# Patient Record
Sex: MALE | Race: BLACK OR AFRICAN AMERICAN | Employment: OTHER | URBAN - METROPOLITAN AREA
[De-identification: names, ages, dates, MRNs, and addresses within clinical notes are randomized per-mention and may not be internally consistent; named-entity substitution may affect disease eponyms.]

---

## 2018-09-18 ENCOUNTER — HOSPITAL ENCOUNTER (INPATIENT)
Dept: MEDSURG UNIT | Age: 73
LOS: 11 days | Discharge: NURSING, REHAB OR LONG-TERM CARE | DRG: 871 | End: 2018-09-29
Attending: EMERGENCY MEDICINE
Payer: MEDICARE

## 2018-09-18 DIAGNOSIS — J18.9 PNEUMONIA DUE TO ORGANISM: Primary | ICD-10-CM

## 2018-09-18 DIAGNOSIS — J44.1 COPD EXACERBATION (HCC): ICD-10-CM

## 2018-09-18 DIAGNOSIS — A41.9 SEPTICEMIA (HCC): ICD-10-CM

## 2018-09-18 DIAGNOSIS — M54.50 CHRONIC LOW BACK PAIN WITHOUT SCIATICA, UNSPECIFIED BACK PAIN LATERALITY: ICD-10-CM

## 2018-09-18 DIAGNOSIS — G89.29 CHRONIC LOW BACK PAIN WITHOUT SCIATICA, UNSPECIFIED BACK PAIN LATERALITY: ICD-10-CM

## 2018-09-18 LAB
A/G RATIO: 1.3 (ref 1.1–2.2)
ALBUMIN SERPL-MCNC: 3.7 G/DL (ref 3.4–5)
ALP BLD-CCNC: 97 U/L (ref 40–129)
ALT SERPL-CCNC: 14 U/L (ref 10–40)
ANION GAP SERPL CALCULATED.3IONS-SCNC: 13 MMOL/L (ref 3–16)
AST SERPL-CCNC: 20 U/L (ref 15–37)
BASOPHILS ABSOLUTE: 0.1 K/UL (ref 0–0.2)
BASOPHILS RELATIVE PERCENT: 0.3 %
BILIRUB SERPL-MCNC: 0.7 MG/DL (ref 0–1)
BUN BLDV-MCNC: 19 MG/DL (ref 7–20)
CALCIUM SERPL-MCNC: 9 MG/DL (ref 8.3–10.6)
CHLORIDE BLD-SCNC: 92 MMOL/L (ref 99–110)
CO2: 31 MMOL/L (ref 21–32)
CREAT SERPL-MCNC: 1.4 MG/DL (ref 0.8–1.3)
EOSINOPHILS ABSOLUTE: 0.1 K/UL (ref 0–0.6)
EOSINOPHILS RELATIVE PERCENT: 0.6 %
GFR AFRICAN AMERICAN: >60
GFR NON-AFRICAN AMERICAN: 50
GLOBULIN: 2.9 G/DL
GLUCOSE BLD-MCNC: 95 MG/DL (ref 70–99)
HCT VFR BLD CALC: 35.2 % (ref 40.5–52.5)
HEMOGLOBIN: 10.7 G/DL (ref 13.5–17.5)
LACTIC ACID: 2.6 MMOL/L (ref 0.4–2)
LACTIC ACID: 2.7 MMOL/L (ref 0.4–2)
LACTIC ACID: 3 MMOL/L (ref 0.4–2)
LACTIC ACID: 4.7 MMOL/L (ref 0.4–2)
LYMPHOCYTES ABSOLUTE: 1.3 K/UL (ref 1–5.1)
LYMPHOCYTES RELATIVE PERCENT: 7.1 %
MCH RBC QN AUTO: 29 PG (ref 26–34)
MCHC RBC AUTO-ENTMCNC: 30.3 G/DL (ref 31–36)
MCV RBC AUTO: 95.6 FL (ref 80–100)
MONOCYTES ABSOLUTE: 1.5 K/UL (ref 0–1.3)
MONOCYTES RELATIVE PERCENT: 8.4 %
NEUTROPHILS ABSOLUTE: 15.3 K/UL (ref 1.7–7.7)
NEUTROPHILS RELATIVE PERCENT: 83.6 %
PDW BLD-RTO: 14.9 % (ref 12.4–15.4)
PLATELET # BLD: 212 K/UL (ref 135–450)
PMV BLD AUTO: 7.6 FL (ref 5–10.5)
POTASSIUM SERPL-SCNC: 5 MMOL/L (ref 3.5–5.1)
PRO-BNP: 1526 PG/ML (ref 0–124)
PROCALCITONIN: 38.45 NG/ML (ref 0–0.15)
RBC # BLD: 3.68 M/UL (ref 4.2–5.9)
SODIUM BLD-SCNC: 136 MMOL/L (ref 136–145)
TOTAL PROTEIN: 6.6 G/DL (ref 6.4–8.2)
TROPONIN: 0.01 NG/ML
WBC # BLD: 18.3 K/UL (ref 4–11)

## 2018-09-18 PROCEDURE — 84484 ASSAY OF TROPONIN QUANT: CPT

## 2018-09-18 PROCEDURE — 94640 AIRWAY INHALATION TREATMENT: CPT

## 2018-09-18 PROCEDURE — 93005 ELECTROCARDIOGRAM TRACING: CPT

## 2018-09-18 PROCEDURE — 83605 ASSAY OF LACTIC ACID: CPT

## 2018-09-18 PROCEDURE — 87040 BLOOD CULTURE FOR BACTERIA: CPT

## 2018-09-18 PROCEDURE — 80053 COMPREHEN METABOLIC PANEL: CPT

## 2018-09-18 PROCEDURE — 94664 DEMO&/EVAL PT USE INHALER: CPT

## 2018-09-18 PROCEDURE — 85025 COMPLETE CBC W/AUTO DIFF WBC: CPT

## 2018-09-18 PROCEDURE — 9990 CHARGE CONVERSION

## 2018-09-18 PROCEDURE — 99285 EMERGENCY DEPT VISIT HI MDM: CPT

## 2018-09-18 PROCEDURE — 83880 ASSAY OF NATRIURETIC PEPTIDE: CPT

## 2018-09-18 PROCEDURE — 36415 COLL VENOUS BLD VENIPUNCTURE: CPT

## 2018-09-18 PROCEDURE — 71045 X-RAY EXAM CHEST 1 VIEW: CPT

## 2018-09-18 PROCEDURE — 96365 THER/PROPH/DIAG IV INF INIT: CPT

## 2018-09-18 PROCEDURE — 87801 DETECT AGNT MULT DNA AMPLI: CPT

## 2018-09-18 PROCEDURE — 94762 N-INVAS EAR/PLS OXIMTRY CONT: CPT

## 2018-09-18 PROCEDURE — 87449 NOS EACH ORGANISM AG IA: CPT

## 2018-09-18 PROCEDURE — 93010 ELECTROCARDIOGRAM REPORT: CPT | Performed by: INTERNAL MEDICINE

## 2018-09-18 PROCEDURE — 96367 TX/PROPH/DG ADDL SEQ IV INF: CPT

## 2018-09-18 PROCEDURE — 96375 TX/PRO/DX INJ NEW DRUG ADDON: CPT

## 2018-09-18 PROCEDURE — 84145 PROCALCITONIN (PCT): CPT

## 2018-09-18 PROCEDURE — 87641 MR-STAPH DNA AMP PROBE: CPT

## 2018-09-18 RX ORDER — PREDNISONE 1 MG/1
5 TABLET ORAL DAILY
COMMUNITY

## 2018-09-18 RX ORDER — MORPHINE SULFATE 4 MG/ML
4 INJECTION, SOLUTION INTRAMUSCULAR; INTRAVENOUS ONCE
Status: COMPLETED | OUTPATIENT
Start: 2018-09-18 | End: 2018-09-18

## 2018-09-18 RX ORDER — APIXABAN 5 MG/1
TABLET, FILM COATED ORAL 2 TIMES DAILY
COMMUNITY

## 2018-09-18 RX ORDER — ONDANSETRON 2 MG/ML
4 INJECTION INTRAMUSCULAR; INTRAVENOUS EVERY 6 HOURS PRN
Status: DISCONTINUED | OUTPATIENT
Start: 2018-09-18 | End: 2018-09-29 | Stop reason: HOSPADM

## 2018-09-18 RX ORDER — ALBUTEROL SULFATE 2.5 MG/3ML
5 SOLUTION RESPIRATORY (INHALATION) ONCE
Status: COMPLETED | OUTPATIENT
Start: 2018-09-18 | End: 2018-09-18

## 2018-09-18 RX ORDER — FUROSEMIDE 40 MG/1
40 TABLET ORAL DAILY
Status: DISCONTINUED | OUTPATIENT
Start: 2018-09-19 | End: 2018-09-18

## 2018-09-18 RX ORDER — ACETAMINOPHEN 325 MG/1
325 TABLET ORAL ONCE
Status: DISCONTINUED | OUTPATIENT
Start: 2018-09-18 | End: 2018-09-18

## 2018-09-18 RX ORDER — SODIUM CHLORIDE 0.9 % (FLUSH) 0.9 %
10 SYRINGE (ML) INJECTION EVERY 12 HOURS SCHEDULED
Status: DISCONTINUED | OUTPATIENT
Start: 2018-09-18 | End: 2018-09-29 | Stop reason: HOSPADM

## 2018-09-18 RX ORDER — ONDANSETRON 2 MG/ML
4 INJECTION INTRAMUSCULAR; INTRAVENOUS ONCE
Status: COMPLETED | OUTPATIENT
Start: 2018-09-18 | End: 2018-09-18

## 2018-09-18 RX ORDER — LISINOPRIL 20 MG/1
40 TABLET ORAL DAILY
Status: DISCONTINUED | OUTPATIENT
Start: 2018-09-19 | End: 2018-09-18

## 2018-09-18 RX ORDER — LIDOCAINE 50 MG/G
1 PATCH TOPICAL DAILY
Status: ON HOLD | COMMUNITY
End: 2021-04-21

## 2018-09-18 RX ORDER — AMLODIPINE BESYLATE 5 MG/1
5 TABLET ORAL DAILY
Status: DISCONTINUED | OUTPATIENT
Start: 2018-09-19 | End: 2018-09-20

## 2018-09-18 RX ORDER — PANTOPRAZOLE SODIUM 40 MG/1
40 TABLET, DELAYED RELEASE ORAL DAILY
Status: ON HOLD | COMMUNITY
End: 2020-07-15

## 2018-09-18 RX ORDER — PREDNISONE 20 MG/1
40 TABLET ORAL DAILY
Status: DISPENSED | OUTPATIENT
Start: 2018-09-18 | End: 2018-09-23

## 2018-09-18 RX ORDER — DICYCLOMINE HYDROCHLORIDE 10 MG/1
10 CAPSULE ORAL
Status: DISCONTINUED | OUTPATIENT
Start: 2018-09-18 | End: 2018-09-18

## 2018-09-18 RX ORDER — MORPHINE SULFATE 2 MG/ML
1 INJECTION, SOLUTION INTRAMUSCULAR; INTRAVENOUS EVERY 4 HOURS PRN
Status: DISCONTINUED | OUTPATIENT
Start: 2018-09-18 | End: 2018-09-24

## 2018-09-18 RX ORDER — PANTOPRAZOLE SODIUM 40 MG/1
40 TABLET, DELAYED RELEASE ORAL
Status: DISCONTINUED | OUTPATIENT
Start: 2018-09-19 | End: 2018-09-21

## 2018-09-18 RX ORDER — SODIUM CHLORIDE 9 MG/ML
INJECTION, SOLUTION INTRAVENOUS CONTINUOUS
Status: DISPENSED | OUTPATIENT
Start: 2018-09-18 | End: 2018-09-19

## 2018-09-18 RX ORDER — TRAZODONE HYDROCHLORIDE 50 MG/1
50 TABLET ORAL NIGHTLY PRN
Status: ON HOLD | COMMUNITY
End: 2020-07-15

## 2018-09-18 RX ORDER — BUDESONIDE 0.25 MG/2ML
1 INHALANT ORAL 2 TIMES DAILY
Status: ON HOLD | COMMUNITY
End: 2021-04-21

## 2018-09-18 RX ORDER — IPRATROPIUM BROMIDE AND ALBUTEROL SULFATE 2.5; .5 MG/3ML; MG/3ML
1 SOLUTION RESPIRATORY (INHALATION)
Status: DISCONTINUED | OUTPATIENT
Start: 2018-09-18 | End: 2018-09-24

## 2018-09-18 RX ORDER — DOCUSATE SODIUM 100 MG/1
100 CAPSULE, LIQUID FILLED ORAL 2 TIMES DAILY
Status: DISCONTINUED | OUTPATIENT
Start: 2018-09-18 | End: 2018-09-24

## 2018-09-18 RX ORDER — IPRATROPIUM BROMIDE AND ALBUTEROL SULFATE 2.5; .5 MG/3ML; MG/3ML
1 SOLUTION RESPIRATORY (INHALATION) ONCE
Status: COMPLETED | OUTPATIENT
Start: 2018-09-18 | End: 2018-09-18

## 2018-09-18 RX ORDER — OXYCODONE HYDROCHLORIDE AND ACETAMINOPHEN 5; 325 MG/1; MG/1
1 TABLET ORAL ONCE
Status: DISCONTINUED | OUTPATIENT
Start: 2018-09-18 | End: 2018-09-18

## 2018-09-18 RX ORDER — ONDANSETRON 4 MG/1
4 TABLET, FILM COATED ORAL EVERY 12 HOURS PRN
COMMUNITY

## 2018-09-18 RX ORDER — GABAPENTIN 300 MG/1
300 CAPSULE ORAL 3 TIMES DAILY
Status: DISCONTINUED | OUTPATIENT
Start: 2018-09-18 | End: 2018-09-29 | Stop reason: HOSPADM

## 2018-09-18 RX ORDER — SODIUM CHLORIDE 0.9 % (FLUSH) 0.9 %
10 SYRINGE (ML) INJECTION PRN
Status: DISCONTINUED | OUTPATIENT
Start: 2018-09-18 | End: 2018-09-29 | Stop reason: HOSPADM

## 2018-09-18 RX ORDER — POLYETHYLENE GLYCOL 3350 17 G/17G
17 POWDER, FOR SOLUTION ORAL 2 TIMES DAILY
Status: DISCONTINUED | OUTPATIENT
Start: 2018-09-18 | End: 2018-09-24

## 2018-09-18 RX ORDER — OXYCODONE HYDROCHLORIDE AND ACETAMINOPHEN 5; 325 MG/1; MG/1
1 TABLET ORAL
Status: ON HOLD | COMMUNITY
End: 2018-09-29 | Stop reason: HOSPADM

## 2018-09-18 RX ORDER — AMLODIPINE BESYLATE 5 MG/1
5 TABLET ORAL DAILY
Status: ON HOLD | COMMUNITY
End: 2018-09-29 | Stop reason: HOSPADM

## 2018-09-18 RX ORDER — POLYETHYLENE GLYCOL 3350 17 G/17G
17 POWDER, FOR SOLUTION ORAL 2 TIMES DAILY
Status: DISCONTINUED | OUTPATIENT
Start: 2018-09-18 | End: 2018-09-18

## 2018-09-18 RX ORDER — METHYLPREDNISOLONE SODIUM SUCCINATE 125 MG/2ML
125 INJECTION, POWDER, LYOPHILIZED, FOR SOLUTION INTRAMUSCULAR; INTRAVENOUS ONCE
Status: COMPLETED | OUTPATIENT
Start: 2018-09-18 | End: 2018-09-18

## 2018-09-18 RX ORDER — 0.9 % SODIUM CHLORIDE 0.9 %
30 INTRAVENOUS SOLUTION INTRAVENOUS ONCE
Status: COMPLETED | OUTPATIENT
Start: 2018-09-18 | End: 2018-09-18

## 2018-09-18 RX ORDER — SENNA AND DOCUSATE SODIUM 50; 8.6 MG/1; MG/1
2 TABLET, FILM COATED ORAL NIGHTLY PRN
Status: ON HOLD | COMMUNITY
End: 2020-07-15

## 2018-09-18 RX ORDER — BUDESONIDE 0.5 MG/2ML
0.5 INHALANT ORAL 2 TIMES DAILY
Status: DISCONTINUED | OUTPATIENT
Start: 2018-09-18 | End: 2018-09-29 | Stop reason: HOSPADM

## 2018-09-18 RX ORDER — GUAIFENESIN 100 MG/5ML
200 SOLUTION ORAL EVERY 4 HOURS PRN
Status: ON HOLD | COMMUNITY
End: 2018-09-29 | Stop reason: HOSPADM

## 2018-09-18 RX ORDER — ACETAMINOPHEN 325 MG/1
650 TABLET ORAL EVERY 4 HOURS PRN
Status: DISCONTINUED | OUTPATIENT
Start: 2018-09-18 | End: 2018-09-29 | Stop reason: HOSPADM

## 2018-09-18 RX ORDER — DILTIAZEM HYDROCHLORIDE 240 MG/1
240 CAPSULE, COATED, EXTENDED RELEASE ORAL DAILY
Status: DISCONTINUED | OUTPATIENT
Start: 2018-09-18 | End: 2018-09-18

## 2018-09-18 RX ADMIN — PREDNISONE 40 MG: 20 TABLET ORAL at 12:38

## 2018-09-18 RX ADMIN — IPRATROPIUM BROMIDE AND ALBUTEROL SULFATE 1 AMPULE: .5; 3 SOLUTION RESPIRATORY (INHALATION) at 11:22

## 2018-09-18 RX ADMIN — DOCUSATE SODIUM 100 MG: 100 CAPSULE, LIQUID FILLED ORAL at 12:39

## 2018-09-18 RX ADMIN — MORPHINE SULFATE 1 MG: 2 INJECTION, SOLUTION INTRAMUSCULAR; INTRAVENOUS at 12:39

## 2018-09-18 RX ADMIN — GABAPENTIN 300 MG: 300 CAPSULE ORAL at 20:42

## 2018-09-18 RX ADMIN — METHYLPREDNISOLONE SODIUM SUCCINATE 125 MG: 125 INJECTION, POWDER, LYOPHILIZED, FOR SOLUTION INTRAMUSCULAR; INTRAVENOUS at 07:31

## 2018-09-18 RX ADMIN — SODIUM CHLORIDE: 9 INJECTION, SOLUTION INTRAVENOUS at 20:42

## 2018-09-18 RX ADMIN — MORPHINE SULFATE 4 MG: 4 INJECTION, SOLUTION INTRAMUSCULAR; INTRAVENOUS at 08:08

## 2018-09-18 RX ADMIN — SODIUM CHLORIDE: 9 INJECTION, SOLUTION INTRAVENOUS at 11:49

## 2018-09-18 RX ADMIN — DOCUSATE SODIUM 100 MG: 100 CAPSULE, LIQUID FILLED ORAL at 20:42

## 2018-09-18 RX ADMIN — PANCRELIPASE 1 CAPSULE: 24000; 76000; 120000 CAPSULE, DELAYED RELEASE PELLETS ORAL at 18:13

## 2018-09-18 RX ADMIN — DILTIAZEM HYDROCHLORIDE 240 MG: 240 CAPSULE, COATED, EXTENDED RELEASE ORAL at 12:39

## 2018-09-18 RX ADMIN — ONDANSETRON 4 MG: 2 INJECTION INTRAMUSCULAR; INTRAVENOUS at 08:08

## 2018-09-18 RX ADMIN — POLYETHYLENE GLYCOL 3350 17 G: 17 POWDER, FOR SOLUTION ORAL at 15:08

## 2018-09-18 RX ADMIN — DICYCLOMINE HYDROCHLORIDE 10 MG: 10 CAPSULE ORAL at 12:39

## 2018-09-18 RX ADMIN — CEFEPIME HYDROCHLORIDE 2 G: 2 INJECTION, POWDER, FOR SOLUTION INTRAVENOUS at 11:49

## 2018-09-18 RX ADMIN — DICYCLOMINE HYDROCHLORIDE 10 MG: 10 CAPSULE ORAL at 16:47

## 2018-09-18 RX ADMIN — APIXABAN 5 MG: 5 TABLET, FILM COATED ORAL at 18:13

## 2018-09-18 RX ADMIN — GABAPENTIN 300 MG: 300 CAPSULE ORAL at 12:38

## 2018-09-18 RX ADMIN — ALBUTEROL SULFATE 5 MG: 2.5 SOLUTION RESPIRATORY (INHALATION) at 08:13

## 2018-09-18 RX ADMIN — Medication 10 ML: at 11:48

## 2018-09-18 RX ADMIN — IPRATROPIUM BROMIDE AND ALBUTEROL SULFATE 1 AMPULE: .5; 3 SOLUTION RESPIRATORY (INHALATION) at 20:47

## 2018-09-18 RX ADMIN — MORPHINE SULFATE 1 MG: 2 INJECTION, SOLUTION INTRAMUSCULAR; INTRAVENOUS at 16:47

## 2018-09-18 RX ADMIN — BUDESONIDE 500 MCG: 0.5 SUSPENSION RESPIRATORY (INHALATION) at 20:55

## 2018-09-18 RX ADMIN — Medication 10 ML: at 20:42

## 2018-09-18 RX ADMIN — Medication 1608 ML: at 08:42

## 2018-09-18 RX ADMIN — IPRATROPIUM BROMIDE AND ALBUTEROL SULFATE 1 AMPULE: 2.5; .5 SOLUTION RESPIRATORY (INHALATION) at 08:13

## 2018-09-18 RX ADMIN — IPRATROPIUM BROMIDE AND ALBUTEROL SULFATE 1 AMPULE: .5; 3 SOLUTION RESPIRATORY (INHALATION) at 16:56

## 2018-09-18 ASSESSMENT — PAIN DESCRIPTION - PROGRESSION: CLINICAL_PROGRESSION: GRADUALLY WORSENING

## 2018-09-18 ASSESSMENT — PAIN DESCRIPTION - ONSET: ONSET: PROGRESSIVE

## 2018-09-18 ASSESSMENT — PAIN SCALES - GENERAL
PAINLEVEL_OUTOF10: 8
PAINLEVEL_OUTOF10: 5
PAINLEVEL_OUTOF10: 6
PAINLEVEL_OUTOF10: 7
PAINLEVEL_OUTOF10: 7
PAINLEVEL_OUTOF10: 0

## 2018-09-18 ASSESSMENT — PAIN DESCRIPTION - PAIN TYPE
TYPE: ACUTE PAIN
TYPE: ACUTE PAIN

## 2018-09-18 ASSESSMENT — PAIN DESCRIPTION - LOCATION
LOCATION: CHEST
LOCATION: CHEST

## 2018-09-18 ASSESSMENT — PAIN DESCRIPTION - DESCRIPTORS
DESCRIPTORS: CONSTANT
DESCRIPTORS: ACHING

## 2018-09-18 ASSESSMENT — PAIN DESCRIPTION - FREQUENCY: FREQUENCY: CONTINUOUS

## 2018-09-18 ASSESSMENT — PAIN DESCRIPTION - ORIENTATION: ORIENTATION: LEFT;LOWER

## 2018-09-18 NOTE — CONSULTS
Clinical Pharmacy Note  Vancomycin Consult    Willie De Leon is a 67 y.o. male ordered Vancomycin; consult received from Dr. Rachel Christy to manage therapy. Also receiving Zosyn. Patient Active Problem List   Diagnosis    Abdominal pain    Hypertension    COPD (chronic obstructive pulmonary disease) with emphysema (HCC)    Hypercapnic respiratory failure, chronic (HCC)    Partial small bowel obstruction (HCC)    Nausea and vomiting       Allergies:  Patient has no known allergies. Temp max:  Temp (24hrs), Av °F (38.3 °C), Min:101 °F (38.3 °C), Max:101 °F (38.3 °C)      No results for input(s): WBC in the last 72 hours. No results for input(s): BUN, CREATININE in the last 72 hours. No intake or output data in the 24 hours ending 18 0731    Culture Results:  pending    Ht Readings from Last 1 Encounters:   04/23/15 5' 8\" (1.727 m)        Wt Readings from Last 1 Encounters:   18 118 lb 2.7 oz (53.6 kg)         CrCl cannot be calculated (Patient's most recent lab result is older than the maximum 10 days allowed. ). Assessment/Plan:  Will initiate vancomycin 1000 mg IV once. Will await further orders from the attending. Thank you for the consult.     Starr Bullock [facility],2018,7:32 AM

## 2018-09-18 NOTE — PROGRESS NOTES
Pt admitted to Rm 4132 from ED, report received from Dustin Romero [facility], pt oriented to room and call light, pt's vitals taken and oxygen increased to 4L for increased RR and SpO2 of 88, urinal given to pt for urine sample, pt having some difficulty urinating
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
## 2018-09-18 NOTE — H&P
wound    CHOLECYSTECTOMY, LAPAROSCOPIC  2/14/2012    COLONOSCOPY  2011    COLOSTOMY      ERCP N/A 9/30/2013    Stent removal, dilitation    HERNIA REPAIR  1976    Umb hernia    REVISION COLOSTOMY      1970s    UPPER GASTROINTESTINAL ENDOSCOPY N/A 8/20/2013    EGD,EUS,ERCP       Medications Prior to Admission:    Prior to Admission medications    Medication Sig Start Date End Date Taking? Authorizing Provider   lisinopril (PRINIVIL;ZESTRIL) 40 MG tablet Take 1 tablet by mouth daily 5/9/15  Yes Monty Alex MD   dicyclomine (BENTYL) 10 MG capsule Take 1 capsule by mouth 4 times daily (before meals and nightly) 5/9/15  Yes Monty Alex MD   furosemide (LASIX) 40 MG tablet Take 40 mg by mouth daily. Yes Historical Provider, MD   gabapentin (NEURONTIN) 300 MG capsule Take 300 mg by mouth 3 times daily. Yes Historical Provider, MD   omeprazole (PRILOSEC) 20 MG capsule   Take 20 mg by mouth Daily    Yes Historical Provider, MD   calcium carbonate (OSCAL) 500 MG TABS tablet Take 500 mg by mouth daily. Yes Historical Provider, MD   polyethylene glycol (GLYCOLAX) powder Take 17 g by mouth 2 times daily. Yes Historical Provider, MD   potassium phosphate, monobasic, (K-PHOS) 500 MG tablet Take 1 tablet by mouth 4 times daily (with meals and nightly). 6/23/14  Yes Marylin Moon MD   docusate sodium (COLACE) 100 MG capsule Take 1 capsule by mouth 2 times daily. 12/5/13  Yes Mike Griffin MD   diltiazem (CARTIA XT) 240 MG ER capsule Take 1 capsule by mouth daily. 8/26/13  Yes Mike Griffin MD   Multiple Vitamin (MULTIVITAMIN PO) Take 1 tablet by mouth daily. Yes Historical Provider, MD   Misc. Devices (STEP N REST II WALKER) MISC Patient requires rolling walker for ambulation, and requires a seat due to his breathing limitations. 6/23/14   Kel Cook MD   tiotropium (SPIRIVA) 18 MCG inhalation capsule Inhale 1 capsule into the lungs daily.  6/23/14   Marylin Moon MD   OXYGEN Inhale 2 L into the lungs continuous. 6/23/14   Jannett Bamberger, MD   Arformoterol Tartrate (BROVANA) 15 MCG/2ML NEBU Take 1 ampule by nebulization 2 times daily. 11/20/13   Malachi Romero MD   albuterol (PROVENTIL HFA;VENTOLIN HFA) 108 (90 BASE) MCG/ACT inhaler   Inhale 2 puffs into the lungs every 6 hours as needed for Wheezing or Shortness of Breath     Historical Provider, MD       Allergies:  Patient has no known allergies. Social History:       reports that he has quit smoking. His smoking use included Cigarettes. He has a 50.00 pack-year smoking history. He has never used smokeless tobacco. He reports that he does not drink alcohol or use drugs. Family History:  Reviewed in detail and negative for DM, Early CAD, Cancer, CVA. Positive as follows:    Family History   Problem Relation Age of Onset    Heart Disease Mother         massive MI    Kidney Disease Father         dialysis    High Blood Pressure Brother        REVIEW OF SYSTEMS:   Positive review  noted in the HPI. All other systems reviewed and negative. PHYSICAL EXAM:    /66   Pulse 106   Temp 98.7 °F (37.1 °C) (Oral)   Resp 20   Ht 5' 8\" (1.727 m)   Wt 118 lb 2.7 oz (53.6 kg)   SpO2 95%   BMI 17.97 kg/m²   General Appearance: alert and oriented to person, place and time, well developed and well- nourished, in no acute distress  Skin: warm and dry, no rash or erythema  Head: normocephalic and atraumatic  Eyes: pupils equal, round, and reactive to light, extraocular eye movements intact, conjunctivae normal  ENT: tympanic membrane, external ear and ear canal normal bilaterally, nose without deformity, nasal mucosa and turbinates normal without polyps  Neck: supple and non-tender without mass, no thyromegaly or thyroid nodules, no cervical lymphadenopathy  Pulmonary/Chest: L side Rhonchi ,  no respiratory distress. tenderness  L ower rib ant abd   Cardiovascular: normal rate, regular rhythm, normal S1 and S2, no murmurs, rubs, clicks, or gallops, Peripheral pulses good, Cap refill <3 sec, no carotid bruits  Abdomen: soft, non-tender, non-distended, normal bowel sounds, no masses or organomegaly  Extremities: no cyanosis, clubbing or edema  Musculoskeletal: normal range of motion, no joint swelling, deformity or tenderness  Neurologic: reflexes normal and symmetric, no cranial nerve deficit, gait, coordination and speech normal      LABS:    CXR:  I have reviewed the CXR with the following interpretation: lingular  pneumonia    EKG:  I have reviewed the EKG with the following interpretation:116, St repolarization change +  CBC   Recent Labs      09/18/18   0737   WBC  18.3*   HGB  10.7*   HCT  35.2*   PLT  212      RENAL  Recent Labs      09/18/18   0737   NA  136   K  5.0   CL  92*   CO2  31   BUN  19   CREATININE  1.4*     LFT'S  Recent Labs      09/18/18   0737   AST  20   ALT  14   BILITOT  0.7   ALKPHOS  97     COAG  No results for input(s): INR in the last 72 hours. CARDIAC ENZYMES  Recent Labs      09/18/18   0737   TROPONINI  0.01       U/A:    Lab Results   Component Value Date    COLORU YELLOW 04/23/2015    WBCUA 0 04/23/2015    RBCUA 3 04/23/2015    RBCUA 2 11/19/2011    MUCUS Rare 06/17/2014    BACTERIA 1+ 06/17/2014    CLARITYU Clear 04/23/2015    SPECGRAV 1.011 04/23/2015    LEUKOCYTESUR Negative 04/23/2015    BLOODU SMALL 04/23/2015    GLUCOSEU Negative 04/23/2015    AMORPHOUS 2+ 05/03/2014       ABG    Lab Results   Component Value Date    RCV6VZQ 38 06/17/2014    BEART 12.4 06/17/2014    K9WPWYZB 99 06/17/2014    PHART 7.45 06/17/2014    XMC6ZTJ 56 06/17/2014    PO2ART 92 06/17/2014    VJA8UUY 40 06/17/2014       UA:No results for input(s): NITRITE, COLORU, PHUR, LABCAST, WBCUA, RBCUA, MUCUS, TRICHOMONAS, YEAST, BACTERIA, CLARITYU, SPECGRAV, LEUKOCYTESUR, UROBILINOGEN, BILIRUBINUR, BLOODU, GLUCOSEU, KETUA, AMORPHOUS in the last 72 hours.     Microbiology:  No results for input(s): Jolie Holman, CXSURG in the last 67 contact me at [phone].

## 2018-09-19 LAB
ANION GAP SERPL CALCULATED.3IONS-SCNC: 10 MMOL/L (ref 3–16)
BUN BLDV-MCNC: 19 MG/DL (ref 7–20)
CALCIUM SERPL-MCNC: 8.7 MG/DL (ref 8.3–10.6)
CHLORIDE BLD-SCNC: 96 MMOL/L (ref 99–110)
CO2: 32 MMOL/L (ref 21–32)
CREAT SERPL-MCNC: 0.9 MG/DL (ref 0.8–1.3)
EKG ATRIAL RATE: 116 BPM
EKG DIAGNOSIS: NORMAL
EKG P AXIS: 75 DEGREES
EKG P-R INTERVAL: 128 MS
EKG Q-T INTERVAL: 298 MS
EKG QRS DURATION: 68 MS
EKG QTC CALCULATION (BAZETT): 414 MS
EKG R AXIS: 39 DEGREES
EKG T AXIS: 77 DEGREES
EKG VENTRICULAR RATE: 116 BPM
GFR AFRICAN AMERICAN: >60
GFR NON-AFRICAN AMERICAN: >60
GLUCOSE BLD-MCNC: 105 MG/DL (ref 70–99)
HCT VFR BLD CALC: 33.6 % (ref 40.5–52.5)
HEMOGLOBIN: 10.4 G/DL (ref 13.5–17.5)
L. PNEUMOPHILA SEROGP 1 UR AG: NORMAL
MCH RBC QN AUTO: 29.1 PG (ref 26–34)
MCHC RBC AUTO-ENTMCNC: 31 G/DL (ref 31–36)
MCV RBC AUTO: 93.9 FL (ref 80–100)
MRSA SCREEN RT-PCR: NORMAL
PDW BLD-RTO: 14.6 % (ref 12.4–15.4)
PLATELET # BLD: 213 K/UL (ref 135–450)
PMV BLD AUTO: 7.8 FL (ref 5–10.5)
POTASSIUM REFLEX MAGNESIUM: 4.7 MMOL/L (ref 3.5–5.1)
RBC # BLD: 3.58 M/UL (ref 4.2–5.9)
REPORT: NORMAL
SODIUM BLD-SCNC: 138 MMOL/L (ref 136–145)
STREP PNEUMONIAE ANTIGEN, URINE: NORMAL
VANCOMYCIN RANDOM: 4.5 UG/ML
WBC # BLD: 18.7 K/UL (ref 4–11)

## 2018-09-19 PROCEDURE — 80202 ASSAY OF VANCOMYCIN: CPT

## 2018-09-19 PROCEDURE — 36415 COLL VENOUS BLD VENIPUNCTURE: CPT

## 2018-09-19 PROCEDURE — 87040 BLOOD CULTURE FOR BACTERIA: CPT

## 2018-09-19 PROCEDURE — 94761 N-INVAS EAR/PLS OXIMETRY MLT: CPT

## 2018-09-19 PROCEDURE — 94667 MNPJ CHEST WALL 1ST: CPT

## 2018-09-19 PROCEDURE — 94640 AIRWAY INHALATION TREATMENT: CPT

## 2018-09-19 PROCEDURE — 80048 BASIC METABOLIC PNL TOTAL CA: CPT

## 2018-09-19 PROCEDURE — 9990 CHARGE CONVERSION

## 2018-09-19 PROCEDURE — 94664 DEMO&/EVAL PT USE INHALER: CPT

## 2018-09-19 PROCEDURE — 85027 COMPLETE CBC AUTOMATED: CPT

## 2018-09-19 RX ORDER — LISINOPRIL 20 MG/1
40 TABLET ORAL DAILY
Status: DISCONTINUED | OUTPATIENT
Start: 2018-09-19 | End: 2018-09-21

## 2018-09-19 RX ORDER — GUAIFENESIN/DEXTROMETHORPHAN 100-10MG/5
5 SYRUP ORAL EVERY 4 HOURS PRN
Status: DISCONTINUED | OUTPATIENT
Start: 2018-09-19 | End: 2018-09-29 | Stop reason: HOSPADM

## 2018-09-19 RX ADMIN — IPRATROPIUM BROMIDE AND ALBUTEROL SULFATE 1 AMPULE: .5; 3 SOLUTION RESPIRATORY (INHALATION) at 12:40

## 2018-09-19 RX ADMIN — CEFEPIME HYDROCHLORIDE 2 G: 2 INJECTION, POWDER, FOR SOLUTION INTRAVENOUS at 23:35

## 2018-09-19 RX ADMIN — CEFEPIME HYDROCHLORIDE 2 G: 2 INJECTION, POWDER, FOR SOLUTION INTRAVENOUS at 00:30

## 2018-09-19 RX ADMIN — ONDANSETRON 4 MG: 2 INJECTION INTRAMUSCULAR; INTRAVENOUS at 07:05

## 2018-09-19 RX ADMIN — APIXABAN 5 MG: 5 TABLET, FILM COATED ORAL at 21:26

## 2018-09-19 RX ADMIN — MORPHINE SULFATE 1 MG: 2 INJECTION, SOLUTION INTRAMUSCULAR; INTRAVENOUS at 07:49

## 2018-09-19 RX ADMIN — MORPHINE SULFATE 1 MG: 2 INJECTION, SOLUTION INTRAMUSCULAR; INTRAVENOUS at 21:20

## 2018-09-19 RX ADMIN — GABAPENTIN 300 MG: 300 CAPSULE ORAL at 21:31

## 2018-09-19 RX ADMIN — MORPHINE SULFATE 1 MG: 2 INJECTION, SOLUTION INTRAMUSCULAR; INTRAVENOUS at 04:11

## 2018-09-19 RX ADMIN — IPRATROPIUM BROMIDE AND ALBUTEROL SULFATE 1 AMPULE: .5; 3 SOLUTION RESPIRATORY (INHALATION) at 17:21

## 2018-09-19 RX ADMIN — MORPHINE SULFATE 1 MG: 2 INJECTION, SOLUTION INTRAMUSCULAR; INTRAVENOUS at 16:13

## 2018-09-19 RX ADMIN — BUDESONIDE 500 MCG: 0.5 SUSPENSION RESPIRATORY (INHALATION) at 08:45

## 2018-09-19 RX ADMIN — IPRATROPIUM BROMIDE AND ALBUTEROL SULFATE 1 AMPULE: .5; 3 SOLUTION RESPIRATORY (INHALATION) at 08:45

## 2018-09-19 RX ADMIN — PANTOPRAZOLE SODIUM 40 MG: 40 TABLET, DELAYED RELEASE ORAL at 05:39

## 2018-09-19 RX ADMIN — Medication 10 ML: at 21:26

## 2018-09-19 RX ADMIN — SODIUM CHLORIDE: 9 INJECTION, SOLUTION INTRAVENOUS at 05:39

## 2018-09-19 RX ADMIN — ONDANSETRON 4 MG: 2 INJECTION INTRAMUSCULAR; INTRAVENOUS at 16:12

## 2018-09-19 RX ADMIN — CEFEPIME HYDROCHLORIDE 2 G: 2 INJECTION, POWDER, FOR SOLUTION INTRAVENOUS at 11:53

## 2018-09-19 RX ADMIN — BUDESONIDE 500 MCG: 0.5 SUSPENSION RESPIRATORY (INHALATION) at 21:09

## 2018-09-19 RX ADMIN — ONDANSETRON 4 MG: 2 INJECTION INTRAMUSCULAR; INTRAVENOUS at 21:29

## 2018-09-19 RX ADMIN — IPRATROPIUM BROMIDE AND ALBUTEROL SULFATE 1 AMPULE: .5; 3 SOLUTION RESPIRATORY (INHALATION) at 21:09

## 2018-09-19 ASSESSMENT — PAIN SCALES - GENERAL
PAINLEVEL_OUTOF10: 9
PAINLEVEL_OUTOF10: 5
PAINLEVEL_OUTOF10: 8
PAINLEVEL_OUTOF10: 7
PAINLEVEL_OUTOF10: 8

## 2018-09-19 ASSESSMENT — PAIN DESCRIPTION - ORIENTATION: ORIENTATION: RIGHT

## 2018-09-19 ASSESSMENT — PAIN DESCRIPTION - PAIN TYPE: TYPE: ACUTE PAIN;CHRONIC PAIN

## 2018-09-19 ASSESSMENT — PAIN DESCRIPTION - LOCATION: LOCATION: BACK;RIB CAGE

## 2018-09-19 NOTE — PROGRESS NOTES
Progress Note  Admit Date: 2018      PCP: May Painting MD     CC: F/U for fever    SUBJECTIVE / Interval History:  No more fever, heart rate improved  Patient still feels sick  Unable to cough much  Shortness of breath present        Allergies  Patient has no known allergies. Medications    Scheduled Meds:   docusate sodium  100 mg Oral BID    gabapentin  300 mg Oral TID    pantoprazole  40 mg Oral QAM AC    sodium chloride flush  10 mL Intravenous 2 times per day    ipratropium-albuterol  1 ampule Inhalation Q4H WA    predniSONE  40 mg Oral Daily    cefepime  2 g Intravenous Q12H    vancomycin  750 mg Intravenous Q24H    vancomycin (VANCOCIN) intermittent dosing (placeholder)   Other RX Placeholder    polyethylene glycol  17 g Oral BID    apixaban  5 mg Oral BID    lipase-protease-amylase  1 capsule Oral TID WC    amLODIPine  5 mg Oral Daily    budesonide  0.5 mg Nebulization BID     Continuous Infusions:   sodium chloride 100 mL/hr at 18 0539       PRN Meds:  guaiFENesin-dextromethorphan, sodium chloride flush, magnesium hydroxide, ondansetron, acetaminophen, morphine    Vitals    TEMPERATURE:  Current - Temp: 99 °F (37.2 °C); Max - Temp  Av.1 °F (37.3 °C)  Min: 98 °F (36.7 °C)  Max: 99.8 °F (37.7 °C)  RESPIRATIONS RANGE: Resp  Av.9  Min: 15  Max: 28  PULSE RANGE: Pulse  Av.5  Min: 94  Max: 114  BLOOD PRESSURE RANGE:  Systolic (69JOC), UOF:570 , Min:94 , NYD:524   ; Diastolic (74OBU), VY, Min:60, Max:74    PULSE OXIMETRY RANGE: SpO2  Av.6 %  Min: 88 %  Max: 96 %  24HR INTAKE/OUTPUT:    Intake/Output Summary (Last 24 hours) at 18 0848  Last data filed at 18 0540   Gross per 24 hour   Intake                0 ml   Output             1350 ml   Net            -1350 ml       Exam:    Gen: No distress. Eyes: PERRL. No sclera icterus. No conjunctival injection. ENT: No discharge. Pharynx clear.  External appearance of ears and nose normal.  Neck:

## 2018-09-20 LAB
ANION GAP SERPL CALCULATED.3IONS-SCNC: 8 MMOL/L (ref 3–16)
BUN BLDV-MCNC: 16 MG/DL (ref 7–20)
CALCIUM SERPL-MCNC: 9 MG/DL (ref 8.3–10.6)
CHLORIDE BLD-SCNC: 91 MMOL/L (ref 99–110)
CO2: 33 MMOL/L (ref 21–32)
CREAT SERPL-MCNC: 0.7 MG/DL (ref 0.8–1.3)
GFR AFRICAN AMERICAN: >60
GFR NON-AFRICAN AMERICAN: >60
GLUCOSE BLD-MCNC: 91 MG/DL (ref 70–99)
HCT VFR BLD CALC: 29.7 % (ref 40.5–52.5)
HEMOGLOBIN: 9.4 G/DL (ref 13.5–17.5)
MCH RBC QN AUTO: 29.8 PG (ref 26–34)
MCHC RBC AUTO-ENTMCNC: 31.8 G/DL (ref 31–36)
MCV RBC AUTO: 93.9 FL (ref 80–100)
PDW BLD-RTO: 14.3 % (ref 12.4–15.4)
PLATELET # BLD: 198 K/UL (ref 135–450)
PMV BLD AUTO: 8.2 FL (ref 5–10.5)
POTASSIUM REFLEX MAGNESIUM: 4.3 MMOL/L (ref 3.5–5.1)
PROCALCITONIN: 24.05 NG/ML (ref 0–0.15)
RBC # BLD: 3.17 M/UL (ref 4.2–5.9)
SODIUM BLD-SCNC: 132 MMOL/L (ref 136–145)
VANCOMYCIN TROUGH: 11.7 UG/ML (ref 10–20)
WBC # BLD: 16.1 K/UL (ref 4–11)

## 2018-09-20 PROCEDURE — 36415 COLL VENOUS BLD VENIPUNCTURE: CPT

## 2018-09-20 PROCEDURE — 94760 N-INVAS EAR/PLS OXIMETRY 1: CPT

## 2018-09-20 PROCEDURE — 94640 AIRWAY INHALATION TREATMENT: CPT

## 2018-09-20 PROCEDURE — 80048 BASIC METABOLIC PNL TOTAL CA: CPT

## 2018-09-20 PROCEDURE — 85027 COMPLETE CBC AUTOMATED: CPT

## 2018-09-20 PROCEDURE — 94667 MNPJ CHEST WALL 1ST: CPT

## 2018-09-20 PROCEDURE — 80202 ASSAY OF VANCOMYCIN: CPT

## 2018-09-20 PROCEDURE — 84145 PROCALCITONIN (PCT): CPT

## 2018-09-20 PROCEDURE — 9990 CHARGE CONVERSION

## 2018-09-20 RX ORDER — DILTIAZEM HYDROCHLORIDE 120 MG/1
120 CAPSULE, COATED, EXTENDED RELEASE ORAL DAILY
Status: DISCONTINUED | OUTPATIENT
Start: 2018-09-20 | End: 2018-09-29 | Stop reason: HOSPADM

## 2018-09-20 RX ADMIN — GABAPENTIN 300 MG: 300 CAPSULE ORAL at 09:05

## 2018-09-20 RX ADMIN — BUDESONIDE 500 MCG: 0.5 SUSPENSION RESPIRATORY (INHALATION) at 08:16

## 2018-09-20 RX ADMIN — IPRATROPIUM BROMIDE AND ALBUTEROL SULFATE 1 AMPULE: .5; 3 SOLUTION RESPIRATORY (INHALATION) at 12:20

## 2018-09-20 RX ADMIN — BUDESONIDE 500 MCG: 0.5 SUSPENSION RESPIRATORY (INHALATION) at 21:53

## 2018-09-20 RX ADMIN — GABAPENTIN 300 MG: 300 CAPSULE ORAL at 13:56

## 2018-09-20 RX ADMIN — IPRATROPIUM BROMIDE AND ALBUTEROL SULFATE 1 AMPULE: .5; 3 SOLUTION RESPIRATORY (INHALATION) at 21:53

## 2018-09-20 RX ADMIN — APIXABAN 5 MG: 5 TABLET, FILM COATED ORAL at 20:47

## 2018-09-20 RX ADMIN — PANTOPRAZOLE SODIUM 40 MG: 40 TABLET, DELAYED RELEASE ORAL at 09:06

## 2018-09-20 RX ADMIN — Medication 10 ML: at 20:50

## 2018-09-20 RX ADMIN — POLYETHYLENE GLYCOL 3350 17 G: 17 POWDER, FOR SOLUTION ORAL at 09:05

## 2018-09-20 RX ADMIN — ONDANSETRON 4 MG: 2 INJECTION INTRAMUSCULAR; INTRAVENOUS at 03:30

## 2018-09-20 RX ADMIN — CEFEPIME HYDROCHLORIDE 2 G: 2 INJECTION, POWDER, FOR SOLUTION INTRAVENOUS at 11:44

## 2018-09-20 RX ADMIN — Medication 10 ML: at 09:12

## 2018-09-20 RX ADMIN — IPRATROPIUM BROMIDE AND ALBUTEROL SULFATE 1 AMPULE: .5; 3 SOLUTION RESPIRATORY (INHALATION) at 08:16

## 2018-09-20 RX ADMIN — IPRATROPIUM BROMIDE AND ALBUTEROL SULFATE 1 AMPULE: .5; 3 SOLUTION RESPIRATORY (INHALATION) at 16:47

## 2018-09-20 RX ADMIN — DOCUSATE SODIUM 100 MG: 100 CAPSULE, LIQUID FILLED ORAL at 20:47

## 2018-09-20 RX ADMIN — LISINOPRIL 40 MG: 20 TABLET ORAL at 09:05

## 2018-09-20 RX ADMIN — PREDNISONE 40 MG: 20 TABLET ORAL at 09:05

## 2018-09-20 RX ADMIN — MORPHINE SULFATE 1 MG: 2 INJECTION, SOLUTION INTRAMUSCULAR; INTRAVENOUS at 09:23

## 2018-09-20 RX ADMIN — DILTIAZEM HYDROCHLORIDE 120 MG: 120 CAPSULE, COATED, EXTENDED RELEASE ORAL at 09:05

## 2018-09-20 RX ADMIN — PANCRELIPASE 1 CAPSULE: 24000; 76000; 120000 CAPSULE, DELAYED RELEASE PELLETS ORAL at 09:05

## 2018-09-20 RX ADMIN — DOCUSATE SODIUM 100 MG: 100 CAPSULE, LIQUID FILLED ORAL at 09:05

## 2018-09-20 RX ADMIN — POLYETHYLENE GLYCOL 3350 17 G: 17 POWDER, FOR SOLUTION ORAL at 20:48

## 2018-09-20 RX ADMIN — GABAPENTIN 300 MG: 300 CAPSULE ORAL at 20:47

## 2018-09-20 RX ADMIN — APIXABAN 5 MG: 5 TABLET, FILM COATED ORAL at 09:05

## 2018-09-20 RX ADMIN — CEFEPIME HYDROCHLORIDE 2 G: 2 INJECTION, POWDER, FOR SOLUTION INTRAVENOUS at 23:10

## 2018-09-20 RX ADMIN — MORPHINE SULFATE 1 MG: 2 INJECTION, SOLUTION INTRAMUSCULAR; INTRAVENOUS at 03:30

## 2018-09-20 RX ADMIN — ONDANSETRON 4 MG: 2 INJECTION INTRAMUSCULAR; INTRAVENOUS at 21:15

## 2018-09-20 ASSESSMENT — PAIN SCALES - GENERAL
PAINLEVEL_OUTOF10: 8
PAINLEVEL_OUTOF10: 8
PAINLEVEL_OUTOF10: 2

## 2018-09-20 NOTE — PROGRESS NOTES
Progress Note  Admit Date: 2018      PCP: Evelina Russell MD     CC: F/U for fever    SUBJECTIVE / Interval History:  No more fever, heart rate improved  Patient starting to feel better   Unable to cough much  Shortness of breath improved        Allergies  Patient has no known allergies. Medications    Scheduled Meds:   lisinopril  40 mg Oral Daily    vancomycin (VANCOCIN) intermittent dosing (placeholder)   Other RX Placeholder    vancomycin  750 mg Intravenous Q12H    docusate sodium  100 mg Oral BID    gabapentin  300 mg Oral TID    pantoprazole  40 mg Oral QAM AC    sodium chloride flush  10 mL Intravenous 2 times per day    ipratropium-albuterol  1 ampule Inhalation Q4H WA    predniSONE  40 mg Oral Daily    cefepime  2 g Intravenous Q12H    polyethylene glycol  17 g Oral BID    apixaban  5 mg Oral BID    lipase-protease-amylase  1 capsule Oral TID WC    amLODIPine  5 mg Oral Daily    budesonide  0.5 mg Nebulization BID     Continuous Infusions:      PRN Meds:  guaiFENesin-dextromethorphan, sodium chloride flush, magnesium hydroxide, ondansetron, acetaminophen, morphine    Vitals    TEMPERATURE:  Current - Temp: 98.9 °F (37.2 °C); Max - Temp  Av.4 °F (37.4 °C)  Min: 98.9 °F (37.2 °C)  Max: 99.8 °F (37.7 °C)  RESPIRATIONS RANGE: Resp  Av.8  Min: 16  Max: 20  PULSE RANGE: Pulse  Av.4  Min: 94  Max: 108  BLOOD PRESSURE RANGE:  Systolic (71GND), MBO:771 , Min:161 , RFN:896   ; Diastolic (14ZXE), AWH:62, Min:65, Max:76    PULSE OXIMETRY RANGE: SpO2  Av.3 %  Min: 91 %  Max: 98 %  24HR INTAKE/OUTPUT:      Intake/Output Summary (Last 24 hours) at 18 0840  Last data filed at 18 0546   Gross per 24 hour   Intake              370 ml   Output             1900 ml   Net            -1530 ml       Exam:    Gen: No distress. Eyes: PERRL. No sclera icterus. No conjunctival injection. ENT: No discharge. Pharynx clear.  External appearance of ears and nose normal.  Neck: positive cocci in clusters  resembling Staphylococcus  Information to follow  *  See additional report for complete BCID panel. BLOODCULT2  No Growth to date. Any change in status will be called. ORG  Staphylococcus coagulase negative DNA Detected*     Fungal Culture:   No results for input(s): FUNGSM in the last 72 hours. No results for input(s): FUNCXBLD in the last 72 hours. CSF Culture:  No results for input(s): COLORCSF, APPEARCSF, CFTUBE, CLOTCSF, WBCCSF, RBCCSF, NEUTCSF, NUMCELLSCSF, LYMPHSCSF, MONOCSF, GLUCCSF, VOLCSF in the last 72 hours. Respiratory Culture:  No results for input(s): Theotis Dirk in the last 72 hours. AFB:No results for input(s): AFBSMEAR in the last 72 hours. Urine Culture  No results for input(s): LABURIN in the last 72 hours. RADIOLOGY:    XR CHEST PORTABLE   Final Result   Lingular pneumonia. CONSULTS:    IP CONSULT TO PHARMACY  IP CONSULT TO HOSPITALIST  PHARMACY TO DOSE VANCOMYCIN    ASSESSMENT AND PLAN:      Active Problems:    Pneumonia  Resolved Problems:    * No resolved hospital problems. *    The patient is a 67 y.o. male 's medical history of COPD, gastric ulcers, who presents to [facility] with fatigue and feeling sick. He is a resident of a Snf, got a CXR there yesterday, was found to have a pneumonia abd started on abx. Over the course of the day he got worse and was sent to ER. Has severe L lower chest pain and fever. Acute hypoxic resp failure- worse  - was hypoxic to 88% on 3 L  - wean o2 as tolerated      Sepsis- white count trending down   - Poa  - with fever/ tachycardia/ leucocytosis  - IVF  - blood c/s coag neg staph,     Coag neg staph bacteremia  - possibly contaminant, with severe sepsis would continue vancomycin  -will repeat .  If repeat blood c/s neg will dc vanco     Pneumonia  - treat as gram neg  - sputum c/s  - MRSA swab neg   -procalcitonin elevated, trending down      COPD with mild exacerbation  - scheduled nebs,

## 2018-09-21 LAB
ANION GAP SERPL CALCULATED.3IONS-SCNC: 11 MMOL/L (ref 3–16)
BLOOD CULTURE, ROUTINE: ABNORMAL
BUN BLDV-MCNC: 21 MG/DL (ref 7–20)
CALCIUM SERPL-MCNC: 9.5 MG/DL (ref 8.3–10.6)
CHLORIDE BLD-SCNC: 90 MMOL/L (ref 99–110)
CO2: 35 MMOL/L (ref 21–32)
CREAT SERPL-MCNC: 0.8 MG/DL (ref 0.8–1.3)
GFR AFRICAN AMERICAN: >60
GFR NON-AFRICAN AMERICAN: >60
GLUCOSE BLD-MCNC: 112 MG/DL (ref 70–99)
HCT VFR BLD CALC: 34.8 % (ref 40.5–52.5)
HEMOGLOBIN: 11.1 G/DL (ref 13.5–17.5)
LIPASE: 20 U/L (ref 13–60)
MCH RBC QN AUTO: 29.6 PG (ref 26–34)
MCHC RBC AUTO-ENTMCNC: 31.8 G/DL (ref 31–36)
MCV RBC AUTO: 93 FL (ref 80–100)
ORGANISM: ABNORMAL
ORGANISM: ABNORMAL
PDW BLD-RTO: 15 % (ref 12.4–15.4)
PLATELET # BLD: 277 K/UL (ref 135–450)
PMV BLD AUTO: 8.3 FL (ref 5–10.5)
POTASSIUM REFLEX MAGNESIUM: 4.3 MMOL/L (ref 3.5–5.1)
RBC # BLD: 3.74 M/UL (ref 4.2–5.9)
SODIUM BLD-SCNC: 136 MMOL/L (ref 136–145)
VANCOMYCIN TROUGH: 15 UG/ML (ref 10–20)
WBC # BLD: 12 K/UL (ref 4–11)

## 2018-09-21 PROCEDURE — 36415 COLL VENOUS BLD VENIPUNCTURE: CPT

## 2018-09-21 PROCEDURE — 80202 ASSAY OF VANCOMYCIN: CPT

## 2018-09-21 PROCEDURE — 80048 BASIC METABOLIC PNL TOTAL CA: CPT

## 2018-09-21 PROCEDURE — 85027 COMPLETE CBC AUTOMATED: CPT

## 2018-09-21 PROCEDURE — 83690 ASSAY OF LIPASE: CPT

## 2018-09-21 PROCEDURE — 94640 AIRWAY INHALATION TREATMENT: CPT

## 2018-09-21 PROCEDURE — S0028 INJECTION, FAMOTIDINE, 20 MG: HCPCS

## 2018-09-21 PROCEDURE — 74018 RADEX ABDOMEN 1 VIEW: CPT

## 2018-09-21 PROCEDURE — 9990 CHARGE CONVERSION

## 2018-09-21 PROCEDURE — 94760 N-INVAS EAR/PLS OXIMETRY 1: CPT

## 2018-09-21 RX ORDER — ENALAPRILAT 2.5 MG/2ML
1.25 INJECTION INTRAVENOUS EVERY 6 HOURS PRN
Status: DISCONTINUED | OUTPATIENT
Start: 2018-09-21 | End: 2018-09-29 | Stop reason: HOSPADM

## 2018-09-21 RX ORDER — MAGNESIUM HYDROXIDE/ALUMINUM HYDROXICE/SIMETHICONE 120; 1200; 1200 MG/30ML; MG/30ML; MG/30ML
15 SUSPENSION ORAL EVERY 6 HOURS PRN
Status: DISCONTINUED | OUTPATIENT
Start: 2018-09-21 | End: 2018-09-25

## 2018-09-21 RX ORDER — SODIUM CHLORIDE 9 MG/ML
INJECTION, SOLUTION INTRAVENOUS
Status: DISPENSED
Start: 2018-09-21 | End: 2018-09-21

## 2018-09-21 RX ORDER — SODIUM CHLORIDE 9 MG/ML
INJECTION, SOLUTION INTRAVENOUS CONTINUOUS
Status: DISCONTINUED | OUTPATIENT
Start: 2018-09-21 | End: 2018-09-23

## 2018-09-21 RX ORDER — HYDRALAZINE HYDROCHLORIDE 20 MG/ML
10 INJECTION INTRAMUSCULAR; INTRAVENOUS EVERY 8 HOURS PRN
Status: DISCONTINUED | OUTPATIENT
Start: 2018-09-21 | End: 2018-09-23

## 2018-09-21 RX ADMIN — MORPHINE SULFATE 1 MG: 2 INJECTION, SOLUTION INTRAMUSCULAR; INTRAVENOUS at 14:19

## 2018-09-21 RX ADMIN — ALUMINUM HYDROXIDE, MAGNESIUM HYDROXIDE, AND SIMETHICONE 15 ML: 200; 200; 20 SUSPENSION ORAL at 20:43

## 2018-09-21 RX ADMIN — ONDANSETRON 4 MG: 2 INJECTION INTRAMUSCULAR; INTRAVENOUS at 20:49

## 2018-09-21 RX ADMIN — FAMOTIDINE 20 MG: 10 INJECTION, SOLUTION INTRAVENOUS at 23:55

## 2018-09-21 RX ADMIN — MORPHINE SULFATE 1 MG: 2 INJECTION, SOLUTION INTRAMUSCULAR; INTRAVENOUS at 01:30

## 2018-09-21 RX ADMIN — SODIUM CHLORIDE: 9 INJECTION, SOLUTION INTRAVENOUS at 09:49

## 2018-09-21 RX ADMIN — BUDESONIDE 500 MCG: 0.5 SUSPENSION RESPIRATORY (INHALATION) at 08:20

## 2018-09-21 RX ADMIN — ALUMINUM HYDROXIDE, MAGNESIUM HYDROXIDE, AND SIMETHICONE 15 ML: 200; 200; 20 SUSPENSION ORAL at 13:54

## 2018-09-21 RX ADMIN — CEFEPIME HYDROCHLORIDE 2 G: 2 INJECTION, POWDER, FOR SOLUTION INTRAVENOUS at 23:09

## 2018-09-21 RX ADMIN — Medication 10 ML: at 20:54

## 2018-09-21 RX ADMIN — CEFEPIME HYDROCHLORIDE 2 G: 2 INJECTION, POWDER, FOR SOLUTION INTRAVENOUS at 11:47

## 2018-09-21 RX ADMIN — MORPHINE SULFATE 1 MG: 2 INJECTION, SOLUTION INTRAMUSCULAR; INTRAVENOUS at 06:39

## 2018-09-21 RX ADMIN — ENALAPRILAT 1.25 MG: 2.5 INJECTION INTRAVENOUS at 13:04

## 2018-09-21 RX ADMIN — IPRATROPIUM BROMIDE AND ALBUTEROL SULFATE 1 AMPULE: .5; 3 SOLUTION RESPIRATORY (INHALATION) at 08:20

## 2018-09-21 RX ADMIN — ENALAPRILAT 1.25 MG: 2.5 INJECTION INTRAVENOUS at 20:54

## 2018-09-21 RX ADMIN — ONDANSETRON 4 MG: 2 INJECTION INTRAMUSCULAR; INTRAVENOUS at 13:05

## 2018-09-21 RX ADMIN — MORPHINE SULFATE 1 MG: 2 INJECTION, SOLUTION INTRAMUSCULAR; INTRAVENOUS at 20:44

## 2018-09-21 RX ADMIN — PANTOPRAZOLE SODIUM 40 MG: 40 TABLET, DELAYED RELEASE ORAL at 05:23

## 2018-09-21 RX ADMIN — ALUMINUM HYDROXIDE, MAGNESIUM HYDROXIDE, AND SIMETHICONE 15 ML: 200; 200; 20 SUSPENSION ORAL at 07:50

## 2018-09-21 ASSESSMENT — PAIN SCALES - GENERAL
PAINLEVEL_OUTOF10: 7
PAINLEVEL_OUTOF10: 8
PAINLEVEL_OUTOF10: 7
PAINLEVEL_OUTOF10: 6
PAINLEVEL_OUTOF10: 6
PAINLEVEL_OUTOF10: 2
PAINLEVEL_OUTOF10: 6

## 2018-09-21 NOTE — PROGRESS NOTES
leucocytosis  - IVF  - blood c/s coag neg staph, repeat NGTD    Coag neg staph bacteremia  - possibly contaminant, repeat blood c/s neg will dc vanco     Pneumonia  - treat as gram neg  - sputum c/s  - MRSA swab neg   -procalcitonin elevated, trending down      COPD with mild exacerbation  - scheduled nebs, steroids     EARLE- resolved   - mild     HTN- uncontrolled  -switch Bp meds to IV if unable to take po   - resume home cardizem      Lactic acidosis  - sec to sepsis  - improved             DVT Prophylaxis:  eliquis  Diet: Diet NPO Effective Now  Code Status: Full Code    PT/OT Eval Status:ordered     Discharge plan - ct care     The patient and / or the family were informed of the results of any tests, a time was given to answer questions, a plan was proposed and they agreed with plan. Discussed with consulting physicians, nursing and social work     The note was completed using EMR. Every effort was made to ensure accuracy; however, inadvertent computerized transcription errors may be present.        Brian Murcia MD

## 2018-09-21 NOTE — PROGRESS NOTES
Pt still with heartburn and nausea after maloxx given. Refuses to eat any breakfast.  Pt also reports having diarrhea. Message sent to Dr. Teresa Leonard via [employer].

## 2018-09-22 ENCOUNTER — APPOINTMENT (OUTPATIENT)
Dept: CT IMAGING | Age: 73
DRG: 871 | End: 2018-09-22
Payer: MEDICARE

## 2018-09-22 LAB
ANION GAP SERPL CALCULATED.3IONS-SCNC: 10 MMOL/L (ref 3–16)
BUN BLDV-MCNC: 27 MG/DL (ref 7–20)
C DIFFICILE TOXIN, EIA: NORMAL
CALCIUM SERPL-MCNC: 8.6 MG/DL (ref 8.3–10.6)
CHLORIDE BLD-SCNC: 97 MMOL/L (ref 99–110)
CO2: 32 MMOL/L (ref 21–32)
CREAT SERPL-MCNC: 0.8 MG/DL (ref 0.8–1.3)
GFR AFRICAN AMERICAN: >60
GFR NON-AFRICAN AMERICAN: >60
GLUCOSE BLD-MCNC: 100 MG/DL (ref 70–99)
HCT VFR BLD CALC: 30.3 % (ref 40.5–52.5)
HEMOGLOBIN: 9.8 G/DL (ref 13.5–17.5)
MCH RBC QN AUTO: 30 PG (ref 26–34)
MCHC RBC AUTO-ENTMCNC: 32.3 G/DL (ref 31–36)
MCV RBC AUTO: 93.1 FL (ref 80–100)
PDW BLD-RTO: 14.8 % (ref 12.4–15.4)
PLATELET # BLD: 244 K/UL (ref 135–450)
PMV BLD AUTO: 7.6 FL (ref 5–10.5)
POTASSIUM REFLEX MAGNESIUM: 4.5 MMOL/L (ref 3.5–5.1)
PROCALCITONIN: 7.12 NG/ML (ref 0–0.15)
RBC # BLD: 3.25 M/UL (ref 4.2–5.9)
SODIUM BLD-SCNC: 139 MMOL/L (ref 136–145)
WBC # BLD: 11.9 K/UL (ref 4–11)

## 2018-09-22 PROCEDURE — S0028 INJECTION, FAMOTIDINE, 20 MG: HCPCS | Performed by: NURSE PRACTITIONER

## 2018-09-22 PROCEDURE — 94668 MNPJ CHEST WALL SBSQ: CPT

## 2018-09-22 PROCEDURE — 94640 AIRWAY INHALATION TREATMENT: CPT

## 2018-09-22 PROCEDURE — 84145 PROCALCITONIN (PCT): CPT

## 2018-09-22 PROCEDURE — 2500000003 HC RX 250 WO HCPCS: Performed by: NURSE PRACTITIONER

## 2018-09-22 PROCEDURE — 87324 CLOSTRIDIUM AG IA: CPT

## 2018-09-22 PROCEDURE — 6370000000 HC RX 637 (ALT 250 FOR IP): Performed by: INTERNAL MEDICINE

## 2018-09-22 PROCEDURE — 2700000000 HC OXYGEN THERAPY PER DAY

## 2018-09-22 PROCEDURE — 80048 BASIC METABOLIC PNL TOTAL CA: CPT

## 2018-09-22 PROCEDURE — 2580000003 HC RX 258: Performed by: INTERNAL MEDICINE

## 2018-09-22 PROCEDURE — 6360000004 HC RX CONTRAST MEDICATION: Performed by: INTERNAL MEDICINE

## 2018-09-22 PROCEDURE — 2500000003 HC RX 250 WO HCPCS: Performed by: HOSPITALIST

## 2018-09-22 PROCEDURE — 1200000000 HC SEMI PRIVATE

## 2018-09-22 PROCEDURE — 6360000002 HC RX W HCPCS: Performed by: INTERNAL MEDICINE

## 2018-09-22 PROCEDURE — 85027 COMPLETE CBC AUTOMATED: CPT

## 2018-09-22 PROCEDURE — 94761 N-INVAS EAR/PLS OXIMETRY MLT: CPT

## 2018-09-22 PROCEDURE — 74177 CT ABD & PELVIS W/CONTRAST: CPT

## 2018-09-22 PROCEDURE — 36415 COLL VENOUS BLD VENIPUNCTURE: CPT

## 2018-09-22 PROCEDURE — 87449 NOS EACH ORGANISM AG IA: CPT

## 2018-09-22 RX ORDER — LABETALOL HYDROCHLORIDE 5 MG/ML
20 INJECTION, SOLUTION INTRAVENOUS EVERY 4 HOURS PRN
Status: DISCONTINUED | OUTPATIENT
Start: 2018-09-22 | End: 2018-09-23

## 2018-09-22 RX ADMIN — ONDANSETRON 4 MG: 2 INJECTION INTRAMUSCULAR; INTRAVENOUS at 06:19

## 2018-09-22 RX ADMIN — GABAPENTIN 300 MG: 300 CAPSULE ORAL at 20:58

## 2018-09-22 RX ADMIN — HYDRALAZINE HYDROCHLORIDE 10 MG: 20 INJECTION INTRAMUSCULAR; INTRAVENOUS at 08:30

## 2018-09-22 RX ADMIN — POLYETHYLENE GLYCOL 3350 17 G: 17 POWDER, FOR SOLUTION ORAL at 20:57

## 2018-09-22 RX ADMIN — Medication 10 ML: at 08:30

## 2018-09-22 RX ADMIN — IPRATROPIUM BROMIDE AND ALBUTEROL SULFATE 1 AMPULE: .5; 3 SOLUTION RESPIRATORY (INHALATION) at 09:15

## 2018-09-22 RX ADMIN — GABAPENTIN 300 MG: 300 CAPSULE ORAL at 08:31

## 2018-09-22 RX ADMIN — BUDESONIDE 500 MCG: 0.5 SUSPENSION RESPIRATORY (INHALATION) at 09:15

## 2018-09-22 RX ADMIN — APIXABAN 5 MG: 5 TABLET, FILM COATED ORAL at 20:57

## 2018-09-22 RX ADMIN — APIXABAN 5 MG: 5 TABLET, FILM COATED ORAL at 08:31

## 2018-09-22 RX ADMIN — MORPHINE SULFATE 1 MG: 2 INJECTION, SOLUTION INTRAMUSCULAR; INTRAVENOUS at 21:06

## 2018-09-22 RX ADMIN — MORPHINE SULFATE 1 MG: 2 INJECTION, SOLUTION INTRAMUSCULAR; INTRAVENOUS at 01:38

## 2018-09-22 RX ADMIN — MORPHINE SULFATE 1 MG: 2 INJECTION, SOLUTION INTRAMUSCULAR; INTRAVENOUS at 06:19

## 2018-09-22 RX ADMIN — SODIUM CHLORIDE: 9 INJECTION, SOLUTION INTRAVENOUS at 06:21

## 2018-09-22 RX ADMIN — MORPHINE SULFATE 1 MG: 2 INJECTION, SOLUTION INTRAMUSCULAR; INTRAVENOUS at 10:26

## 2018-09-22 RX ADMIN — IOPAMIDOL 75 ML: 755 INJECTION, SOLUTION INTRAVENOUS at 13:05

## 2018-09-22 RX ADMIN — DILTIAZEM HYDROCHLORIDE 120 MG: 120 CAPSULE, COATED, EXTENDED RELEASE ORAL at 08:30

## 2018-09-22 RX ADMIN — ALUMINUM HYDROXIDE, MAGNESIUM HYDROXIDE, AND SIMETHICONE 15 ML: 200; 200; 20 SUSPENSION ORAL at 01:38

## 2018-09-22 RX ADMIN — PREDNISONE 40 MG: 20 TABLET ORAL at 08:31

## 2018-09-22 RX ADMIN — CEFEPIME HYDROCHLORIDE 2 G: 2 INJECTION, POWDER, FOR SOLUTION INTRAVENOUS at 23:50

## 2018-09-22 RX ADMIN — IPRATROPIUM BROMIDE AND ALBUTEROL SULFATE 1 AMPULE: .5; 3 SOLUTION RESPIRATORY (INHALATION) at 17:01

## 2018-09-22 RX ADMIN — SODIUM CHLORIDE: 9 INJECTION, SOLUTION INTRAVENOUS at 23:50

## 2018-09-22 RX ADMIN — LABETALOL HYDROCHLORIDE 20 MG: 5 INJECTION INTRAVENOUS at 16:25

## 2018-09-22 RX ADMIN — DOCUSATE SODIUM 100 MG: 100 CAPSULE, LIQUID FILLED ORAL at 20:57

## 2018-09-22 RX ADMIN — FAMOTIDINE 20 MG: 10 INJECTION, SOLUTION INTRAVENOUS at 20:58

## 2018-09-22 RX ADMIN — FAMOTIDINE 20 MG: 10 INJECTION, SOLUTION INTRAVENOUS at 08:30

## 2018-09-22 RX ADMIN — CEFEPIME HYDROCHLORIDE 2 G: 2 INJECTION, POWDER, FOR SOLUTION INTRAVENOUS at 12:19

## 2018-09-22 ASSESSMENT — PAIN SCALES - GENERAL
PAINLEVEL_OUTOF10: 4
PAINLEVEL_OUTOF10: 0
PAINLEVEL_OUTOF10: 8
PAINLEVEL_OUTOF10: 5
PAINLEVEL_OUTOF10: 6
PAINLEVEL_OUTOF10: 4
PAINLEVEL_OUTOF10: 8

## 2018-09-22 ASSESSMENT — PAIN DESCRIPTION - FREQUENCY
FREQUENCY: CONTINUOUS
FREQUENCY: CONTINUOUS

## 2018-09-22 ASSESSMENT — PAIN DESCRIPTION - ORIENTATION
ORIENTATION: LEFT;MID;LOWER
ORIENTATION: LEFT;MID;LOWER

## 2018-09-22 ASSESSMENT — PAIN DESCRIPTION - LOCATION
LOCATION: BACK;RIB CAGE
LOCATION: BACK;RIB CAGE

## 2018-09-22 ASSESSMENT — PAIN DESCRIPTION - PAIN TYPE
TYPE: ACUTE PAIN
TYPE: ACUTE PAIN

## 2018-09-22 ASSESSMENT — PAIN DESCRIPTION - ONSET
ONSET: PROGRESSIVE
ONSET: ON-GOING

## 2018-09-22 ASSESSMENT — PAIN DESCRIPTION - DESCRIPTORS
DESCRIPTORS: ACHING
DESCRIPTORS: ACHING

## 2018-09-22 ASSESSMENT — PAIN DESCRIPTION - PROGRESSION
CLINICAL_PROGRESSION: GRADUALLY IMPROVING
CLINICAL_PROGRESSION: GRADUALLY IMPROVING

## 2018-09-22 NOTE — PROGRESS NOTES
Received new order for Pepcid 20mg IV bid from Jorge Moore NP. Medication information given to pt and med given.

## 2018-09-22 NOTE — PROGRESS NOTES
Hospitalist Progress Note    CC:   Shortness of breath     Admit date: 9/18/2018  Days in hospital:  4    Subjective:   Patient was complaining this morning from abdominal pain, nausea, and loose stool. ROS:   Review of Systems   Constitutional: Negative for chills and fever. Respiratory: Positive  for cough and shortness of breath. Cardiovascular: Negative for chest pain and palpitations. Genitourinary: Negative for dysuria and urgency. Neurological: Negative for headaches. Objective:    BP (!) 158/73   Pulse 100   Temp 99 °F (37.2 °C) (Oral)   Resp 18   Ht 5' 8\" (1.727 m)   Wt 109 lb 2 oz (49.5 kg)   SpO2 96%   BMI 16.59 kg/m²     Gen: alert, NAD  HEENT: NC/AT, moist mucous membranes, no oropharyngeal erythema or exudate  Neck: supple, trachea midline, no anterior cervical or SC LAD  Heart: Normal s1/s2, RRR, no murmurs, gallops, or rubs. Lungs: Diffuse wheezing with some crackles. Increased work of breathing. Abd: bowel sounds present, epigastric tenderness with some distention. No rebound or rigidity. Extrem: No clubbing, cyanosis, No edema  Psych: A & O x3 , affect appropriate  Neuro: grossly intact, moves all four extremities spontaneously.       Medications:  Scheduled Meds:   famotidine (PEPCID) injection  20 mg Intravenous BID    diltiazem  120 mg Oral Daily    docusate sodium  100 mg Oral BID    gabapentin  300 mg Oral TID    sodium chloride flush  10 mL Intravenous 2 times per day    ipratropium-albuterol  1 ampule Inhalation Q4H WA    predniSONE  40 mg Oral Daily    cefepime  2 g Intravenous Q12H    polyethylene glycol  17 g Oral BID    apixaban  5 mg Oral BID    lipase-protease-amylase  1 capsule Oral TID WC    budesonide  0.5 mg Nebulization BID       PRN Meds:  labetalol, iopamidol, aluminum & magnesium hydroxide-simethicone, enalaprilat, hydrALAZINE, guaiFENesin-dextromethorphan, sodium chloride flush, magnesium hydroxide, ondansetron, acetaminophen, morphine    IV:   sodium chloride 50 mL/hr at 09/22/18 5901         Intake/Output Summary (Last 24 hours) at 09/22/18 1203  Last data filed at 09/22/18 1012   Gross per 24 hour   Intake               70 ml   Output             1200 ml   Net            -1130 ml       Results:  CBC: Recent Labs      09/20/18   0719  09/21/18   0729  09/22/18   0540   WBC  16.1*  12.0*  11.9*   HGB  9.4*  11.1*  9.8*   HCT  29.7*  34.8*  30.3*   MCV  93.9  93.0  93.1   PLT  198  277  244     BMP: Recent Labs      09/20/18   0719  09/21/18   0729  09/22/18   0540   NA  132*  136  139   K  4.3  4.3  4.5   CL  91*  90*  97*   CO2  33*  35*  32   BUN  16  21*  27*   CREATININE  0.7*  0.8  0.8     Mag: No results for input(s): MAG in the last 72 hours. Phos:   Lab Results   Component Value Date    PHOS 2.9 05/09/2015     No components found for: GLU    LIVER PROFILE:   Recent Labs      09/21/18   0729   LIPASE  20.0     PT/INR: No results for input(s): PROTIME, INR in the last 72 hours. APTT: No results for input(s): APTT in the last 72 hours. UA:No results for input(s): NITRITE, COLORU, PHUR, LABCAST, WBCUA, RBCUA, MUCUS, TRICHOMONAS, YEAST, BACTERIA, CLARITYU, SPECGRAV, LEUKOCYTESUR, UROBILINOGEN, BILIRUBINUR, BLOODU, GLUCOSEU, AMORPHOUS in the last 72 hours. Invalid input(s): Drea Boyd input(s): ABG  Lab Results   Component Value Date    CALCIUM 8.6 09/22/2018    PHOS 2.9 05/09/2015       Assessment and Plan:    Abdominal pain with nausea:  KUB from yesterday showed possible localized ileus. Patient has abdominal pain, diarrhea, and nausea.   We'll check a CAT scan  Sent for C. diff  Continue with IV fluid  Advance diet as tolerated      Sepsis  Pneumonia  Continue with cefepime for treating gram-negative pneumonia  Culture data are negative to date  Procalcitonin  is going down     Acute on chronic hypoxic respiratory failure  Due to the pneumonia and the COPD   Patient uses 3 L of oxygen at

## 2018-09-23 LAB
ANION GAP SERPL CALCULATED.3IONS-SCNC: 9 MMOL/L (ref 3–16)
BASOPHILS ABSOLUTE: 0 K/UL (ref 0–0.2)
BASOPHILS RELATIVE PERCENT: 0 %
BUN BLDV-MCNC: 26 MG/DL (ref 7–20)
CALCIUM SERPL-MCNC: 9 MG/DL (ref 8.3–10.6)
CHLORIDE BLD-SCNC: 97 MMOL/L (ref 99–110)
CO2: 31 MMOL/L (ref 21–32)
CREAT SERPL-MCNC: 0.8 MG/DL (ref 0.8–1.3)
CULTURE, BLOOD 2: NORMAL
EOSINOPHILS ABSOLUTE: 0 K/UL (ref 0–0.6)
EOSINOPHILS RELATIVE PERCENT: 0 %
GFR AFRICAN AMERICAN: >60
GFR NON-AFRICAN AMERICAN: >60
GLUCOSE BLD-MCNC: 103 MG/DL (ref 70–99)
HCT VFR BLD CALC: 32 % (ref 40.5–52.5)
HEMOGLOBIN: 10.1 G/DL (ref 13.5–17.5)
LYMPHOCYTES ABSOLUTE: 1.1 K/UL (ref 1–5.1)
LYMPHOCYTES RELATIVE PERCENT: 12 %
MCH RBC QN AUTO: 29.1 PG (ref 26–34)
MCHC RBC AUTO-ENTMCNC: 31.5 G/DL (ref 31–36)
MCV RBC AUTO: 92.4 FL (ref 80–100)
MONOCYTES ABSOLUTE: 1.1 K/UL (ref 0–1.3)
MONOCYTES RELATIVE PERCENT: 12 %
MYELOCYTE PERCENT: 2 %
NEUTROPHILS ABSOLUTE: 7.1 K/UL (ref 1.7–7.7)
NEUTROPHILS RELATIVE PERCENT: 74 %
NUCLEATED RED BLOOD CELLS: 1 /100 WBC
PDW BLD-RTO: 14.9 % (ref 12.4–15.4)
PLATELET # BLD: 284 K/UL (ref 135–450)
PMV BLD AUTO: 7.6 FL (ref 5–10.5)
POTASSIUM REFLEX MAGNESIUM: 4.6 MMOL/L (ref 3.5–5.1)
RBC # BLD: 3.46 M/UL (ref 4.2–5.9)
RBC # BLD: NORMAL 10*6/UL
SODIUM BLD-SCNC: 137 MMOL/L (ref 136–145)
WBC # BLD: 9.4 K/UL (ref 4–11)

## 2018-09-23 PROCEDURE — 6360000002 HC RX W HCPCS: Performed by: INTERNAL MEDICINE

## 2018-09-23 PROCEDURE — 2500000003 HC RX 250 WO HCPCS: Performed by: NURSE PRACTITIONER

## 2018-09-23 PROCEDURE — 36415 COLL VENOUS BLD VENIPUNCTURE: CPT

## 2018-09-23 PROCEDURE — 94667 MNPJ CHEST WALL 1ST: CPT

## 2018-09-23 PROCEDURE — 2580000003 HC RX 258: Performed by: INTERNAL MEDICINE

## 2018-09-23 PROCEDURE — 6370000000 HC RX 637 (ALT 250 FOR IP): Performed by: INTERNAL MEDICINE

## 2018-09-23 PROCEDURE — 6370000000 HC RX 637 (ALT 250 FOR IP): Performed by: HOSPITALIST

## 2018-09-23 PROCEDURE — 80048 BASIC METABOLIC PNL TOTAL CA: CPT

## 2018-09-23 PROCEDURE — 85025 COMPLETE CBC W/AUTO DIFF WBC: CPT

## 2018-09-23 PROCEDURE — 1200000000 HC SEMI PRIVATE

## 2018-09-23 PROCEDURE — 94640 AIRWAY INHALATION TREATMENT: CPT

## 2018-09-23 PROCEDURE — S0028 INJECTION, FAMOTIDINE, 20 MG: HCPCS | Performed by: NURSE PRACTITIONER

## 2018-09-23 PROCEDURE — 94760 N-INVAS EAR/PLS OXIMETRY 1: CPT

## 2018-09-23 PROCEDURE — 2700000000 HC OXYGEN THERAPY PER DAY

## 2018-09-23 PROCEDURE — 94668 MNPJ CHEST WALL SBSQ: CPT

## 2018-09-23 RX ORDER — ALPRAZOLAM 0.25 MG/1
0.25 TABLET ORAL NIGHTLY PRN
Status: DISCONTINUED | OUTPATIENT
Start: 2018-09-23 | End: 2018-09-29 | Stop reason: HOSPADM

## 2018-09-23 RX ORDER — LISINOPRIL 20 MG/1
40 TABLET ORAL DAILY
Status: DISCONTINUED | OUTPATIENT
Start: 2018-09-23 | End: 2018-09-25

## 2018-09-23 RX ORDER — AMLODIPINE BESYLATE 5 MG/1
5 TABLET ORAL DAILY
Status: DISCONTINUED | OUTPATIENT
Start: 2018-09-23 | End: 2018-09-25

## 2018-09-23 RX ORDER — FUROSEMIDE 40 MG/1
40 TABLET ORAL DAILY
Status: DISCONTINUED | OUTPATIENT
Start: 2018-09-23 | End: 2018-09-25

## 2018-09-23 RX ADMIN — GABAPENTIN 300 MG: 300 CAPSULE ORAL at 22:19

## 2018-09-23 RX ADMIN — GABAPENTIN 300 MG: 300 CAPSULE ORAL at 10:09

## 2018-09-23 RX ADMIN — MORPHINE SULFATE 1 MG: 2 INJECTION, SOLUTION INTRAMUSCULAR; INTRAVENOUS at 17:02

## 2018-09-23 RX ADMIN — FAMOTIDINE 20 MG: 10 INJECTION, SOLUTION INTRAVENOUS at 10:08

## 2018-09-23 RX ADMIN — DOCUSATE SODIUM 100 MG: 100 CAPSULE, LIQUID FILLED ORAL at 10:09

## 2018-09-23 RX ADMIN — AMLODIPINE BESYLATE 5 MG: 5 TABLET ORAL at 10:08

## 2018-09-23 RX ADMIN — BUDESONIDE 500 MCG: 0.5 SUSPENSION RESPIRATORY (INHALATION) at 20:01

## 2018-09-23 RX ADMIN — DOCUSATE SODIUM 100 MG: 100 CAPSULE, LIQUID FILLED ORAL at 22:19

## 2018-09-23 RX ADMIN — APIXABAN 5 MG: 5 TABLET, FILM COATED ORAL at 10:09

## 2018-09-23 RX ADMIN — MORPHINE SULFATE 1 MG: 2 INJECTION, SOLUTION INTRAMUSCULAR; INTRAVENOUS at 04:47

## 2018-09-23 RX ADMIN — CEFEPIME HYDROCHLORIDE 2 G: 2 INJECTION, POWDER, FOR SOLUTION INTRAVENOUS at 10:29

## 2018-09-23 RX ADMIN — IPRATROPIUM BROMIDE AND ALBUTEROL SULFATE 1 AMPULE: .5; 3 SOLUTION RESPIRATORY (INHALATION) at 09:42

## 2018-09-23 RX ADMIN — GABAPENTIN 300 MG: 300 CAPSULE ORAL at 13:20

## 2018-09-23 RX ADMIN — LISINOPRIL 40 MG: 20 TABLET ORAL at 10:08

## 2018-09-23 RX ADMIN — PANCRELIPASE 1 CAPSULE: 24000; 76000; 120000 CAPSULE, DELAYED RELEASE PELLETS ORAL at 10:08

## 2018-09-23 RX ADMIN — FAMOTIDINE 20 MG: 10 INJECTION, SOLUTION INTRAVENOUS at 22:20

## 2018-09-23 RX ADMIN — DILTIAZEM HYDROCHLORIDE 120 MG: 120 CAPSULE, COATED, EXTENDED RELEASE ORAL at 10:09

## 2018-09-23 RX ADMIN — PANCRELIPASE 1 CAPSULE: 24000; 76000; 120000 CAPSULE, DELAYED RELEASE PELLETS ORAL at 17:02

## 2018-09-23 RX ADMIN — ONDANSETRON 4 MG: 2 INJECTION INTRAMUSCULAR; INTRAVENOUS at 10:29

## 2018-09-23 RX ADMIN — POLYETHYLENE GLYCOL 3350 17 G: 17 POWDER, FOR SOLUTION ORAL at 22:19

## 2018-09-23 RX ADMIN — CEFEPIME HYDROCHLORIDE 2 G: 2 INJECTION, POWDER, FOR SOLUTION INTRAVENOUS at 22:19

## 2018-09-23 RX ADMIN — POLYETHYLENE GLYCOL 3350 17 G: 17 POWDER, FOR SOLUTION ORAL at 10:09

## 2018-09-23 RX ADMIN — Medication 10 ML: at 22:20

## 2018-09-23 RX ADMIN — Medication 10 ML: at 10:10

## 2018-09-23 RX ADMIN — FUROSEMIDE 40 MG: 40 TABLET ORAL at 10:09

## 2018-09-23 RX ADMIN — IPRATROPIUM BROMIDE AND ALBUTEROL SULFATE 1 AMPULE: .5; 3 SOLUTION RESPIRATORY (INHALATION) at 20:00

## 2018-09-23 RX ADMIN — APIXABAN 5 MG: 5 TABLET, FILM COATED ORAL at 22:19

## 2018-09-23 RX ADMIN — PANCRELIPASE 1 CAPSULE: 24000; 76000; 120000 CAPSULE, DELAYED RELEASE PELLETS ORAL at 13:20

## 2018-09-23 ASSESSMENT — PAIN SCALES - GENERAL
PAINLEVEL_OUTOF10: 0
PAINLEVEL_OUTOF10: 2
PAINLEVEL_OUTOF10: 7
PAINLEVEL_OUTOF10: 6

## 2018-09-23 NOTE — PROGRESS NOTES
Hospitalist Progress Note    CC:   Shortness of breath     Admit date: 9/18/2018  Days in hospital:  5    Subjective:     Complaining from some anxiety and nausea. Didn't want to have breakfast this morning. He thinks his breathing is better. ROS:   Review of Systems   Constitutional: Negative for chills and fever. Respiratory: Positive  for cough and shortness of breath. Cardiovascular: Negative for chest pain and palpitations. Genitourinary: Negative for dysuria and urgency. Neurological: Negative for headaches. Objective:    BP (!) 166/72   Pulse 75   Temp 98.6 °F (37 °C) (Oral)   Resp 16   Ht 5' 8\" (1.727 m)   Wt 108 lb 14.5 oz (49.4 kg)   SpO2 98%   BMI 16.56 kg/m²     Gen: alert, NAD  HEENT: NC/AT, moist mucous membranes, no oropharyngeal erythema or exudate  Neck: supple, trachea midline, no anterior cervical or SC LAD  Heart: Normal s1/s2, RRR, no murmurs, gallops, or rubs. Lungs: Mild wheezing. No crackles. No use of accessory muscles. Abd: bowel sounds present, no tenderness or distention. No rebound or rigidity. Extrem: No clubbing, cyanosis, No edema  Psych: A & O x3 , affect appropriate  Neuro: grossly intact, moves all four extremities spontaneously.       Medications:  Scheduled Meds:   famotidine (PEPCID) injection  20 mg Intravenous BID    diltiazem  120 mg Oral Daily    docusate sodium  100 mg Oral BID    gabapentin  300 mg Oral TID    sodium chloride flush  10 mL Intravenous 2 times per day    ipratropium-albuterol  1 ampule Inhalation Q4H WA    predniSONE  40 mg Oral Daily    cefepime  2 g Intravenous Q12H    polyethylene glycol  17 g Oral BID    apixaban  5 mg Oral BID    lipase-protease-amylase  1 capsule Oral TID WC    budesonide  0.5 mg Nebulization BID       PRN Meds:  labetalol, aluminum & magnesium hydroxide-simethicone, enalaprilat, hydrALAZINE, guaiFENesin-dextromethorphan, sodium chloride flush, magnesium hydroxide, ondansetron,

## 2018-09-24 PROBLEM — E43 SEVERE MALNUTRITION (HCC): Chronic | Status: ACTIVE | Noted: 2018-09-24

## 2018-09-24 LAB
ANION GAP SERPL CALCULATED.3IONS-SCNC: 11 MMOL/L (ref 3–16)
ANISOCYTOSIS: ABNORMAL
ATYPICAL LYMPHOCYTE RELATIVE PERCENT: 1 % (ref 0–6)
BANDED NEUTROPHILS RELATIVE PERCENT: 2 % (ref 0–7)
BASOPHILS ABSOLUTE: 0 K/UL (ref 0–0.2)
BASOPHILS RELATIVE PERCENT: 0 %
BLOOD CULTURE, ROUTINE: NORMAL
BUN BLDV-MCNC: 26 MG/DL (ref 7–20)
CALCIUM SERPL-MCNC: 8.9 MG/DL (ref 8.3–10.6)
CHLORIDE BLD-SCNC: 96 MMOL/L (ref 99–110)
CO2: 31 MMOL/L (ref 21–32)
CREAT SERPL-MCNC: 1 MG/DL (ref 0.8–1.3)
CULTURE, BLOOD 2: NORMAL
EOSINOPHILS ABSOLUTE: 0.6 K/UL (ref 0–0.6)
EOSINOPHILS RELATIVE PERCENT: 7 %
GFR AFRICAN AMERICAN: >60
GFR NON-AFRICAN AMERICAN: >60
GLUCOSE BLD-MCNC: 126 MG/DL (ref 70–99)
HCT VFR BLD CALC: 33.2 % (ref 40.5–52.5)
HEMOGLOBIN: 10.4 G/DL (ref 13.5–17.5)
LYMPHOCYTES ABSOLUTE: 1.6 K/UL (ref 1–5.1)
LYMPHOCYTES RELATIVE PERCENT: 17 %
MCH RBC QN AUTO: 29.3 PG (ref 26–34)
MCHC RBC AUTO-ENTMCNC: 31.5 G/DL (ref 31–36)
MCV RBC AUTO: 93 FL (ref 80–100)
MONOCYTES ABSOLUTE: 1.5 K/UL (ref 0–1.3)
MONOCYTES RELATIVE PERCENT: 17 %
NEUTROPHILS ABSOLUTE: 5 K/UL (ref 1.7–7.7)
NEUTROPHILS RELATIVE PERCENT: 56 %
PDW BLD-RTO: 15.2 % (ref 12.4–15.4)
PLATELET # BLD: 337 K/UL (ref 135–450)
PMV BLD AUTO: 7.6 FL (ref 5–10.5)
POTASSIUM REFLEX MAGNESIUM: 3.7 MMOL/L (ref 3.5–5.1)
PROCALCITONIN: 2.58 NG/ML (ref 0–0.15)
RBC # BLD: 3.57 M/UL (ref 4.2–5.9)
SODIUM BLD-SCNC: 138 MMOL/L (ref 136–145)
WBC # BLD: 8.7 K/UL (ref 4–11)

## 2018-09-24 PROCEDURE — 80048 BASIC METABOLIC PNL TOTAL CA: CPT

## 2018-09-24 PROCEDURE — S0028 INJECTION, FAMOTIDINE, 20 MG: HCPCS | Performed by: NURSE PRACTITIONER

## 2018-09-24 PROCEDURE — 6370000000 HC RX 637 (ALT 250 FOR IP): Performed by: INTERNAL MEDICINE

## 2018-09-24 PROCEDURE — 6370000000 HC RX 637 (ALT 250 FOR IP): Performed by: HOSPITALIST

## 2018-09-24 PROCEDURE — G8979 MOBILITY GOAL STATUS: HCPCS

## 2018-09-24 PROCEDURE — 6360000002 HC RX W HCPCS: Performed by: INTERNAL MEDICINE

## 2018-09-24 PROCEDURE — 94640 AIRWAY INHALATION TREATMENT: CPT

## 2018-09-24 PROCEDURE — 85025 COMPLETE CBC W/AUTO DIFF WBC: CPT

## 2018-09-24 PROCEDURE — 2580000003 HC RX 258: Performed by: INTERNAL MEDICINE

## 2018-09-24 PROCEDURE — G8978 MOBILITY CURRENT STATUS: HCPCS

## 2018-09-24 PROCEDURE — 97165 OT EVAL LOW COMPLEX 30 MIN: CPT

## 2018-09-24 PROCEDURE — 1200000000 HC SEMI PRIVATE

## 2018-09-24 PROCEDURE — G8980 MOBILITY D/C STATUS: HCPCS

## 2018-09-24 PROCEDURE — 6370000000 HC RX 637 (ALT 250 FOR IP): Performed by: PHYSICIAN ASSISTANT

## 2018-09-24 PROCEDURE — 97161 PT EVAL LOW COMPLEX 20 MIN: CPT

## 2018-09-24 PROCEDURE — 84145 PROCALCITONIN (PCT): CPT

## 2018-09-24 PROCEDURE — G8987 SELF CARE CURRENT STATUS: HCPCS

## 2018-09-24 PROCEDURE — 94667 MNPJ CHEST WALL 1ST: CPT

## 2018-09-24 PROCEDURE — G8988 SELF CARE GOAL STATUS: HCPCS

## 2018-09-24 PROCEDURE — S0028 INJECTION, FAMOTIDINE, 20 MG: HCPCS | Performed by: HOSPITALIST

## 2018-09-24 PROCEDURE — 2500000003 HC RX 250 WO HCPCS: Performed by: HOSPITALIST

## 2018-09-24 PROCEDURE — G8989 SELF CARE D/C STATUS: HCPCS

## 2018-09-24 PROCEDURE — 36415 COLL VENOUS BLD VENIPUNCTURE: CPT

## 2018-09-24 PROCEDURE — 2700000000 HC OXYGEN THERAPY PER DAY

## 2018-09-24 PROCEDURE — 94668 MNPJ CHEST WALL SBSQ: CPT

## 2018-09-24 PROCEDURE — 2500000003 HC RX 250 WO HCPCS: Performed by: NURSE PRACTITIONER

## 2018-09-24 RX ORDER — IPRATROPIUM BROMIDE AND ALBUTEROL SULFATE 2.5; .5 MG/3ML; MG/3ML
1 SOLUTION RESPIRATORY (INHALATION) 2 TIMES DAILY
Status: DISCONTINUED | OUTPATIENT
Start: 2018-09-24 | End: 2018-09-27

## 2018-09-24 RX ORDER — SIMETHICONE 20 MG/.3ML
40 EMULSION ORAL EVERY 6 HOURS PRN
Status: DISCONTINUED | OUTPATIENT
Start: 2018-09-24 | End: 2018-09-29 | Stop reason: HOSPADM

## 2018-09-24 RX ORDER — OXYCODONE HYDROCHLORIDE AND ACETAMINOPHEN 5; 325 MG/1; MG/1
1 TABLET ORAL EVERY 4 HOURS PRN
Status: DISCONTINUED | OUTPATIENT
Start: 2018-09-24 | End: 2018-09-29 | Stop reason: HOSPADM

## 2018-09-24 RX ORDER — PANTOPRAZOLE SODIUM 40 MG/1
40 TABLET, DELAYED RELEASE ORAL
Status: DISCONTINUED | OUTPATIENT
Start: 2018-09-24 | End: 2018-09-29 | Stop reason: HOSPADM

## 2018-09-24 RX ORDER — PANTOPRAZOLE SODIUM 40 MG/1
40 TABLET, DELAYED RELEASE ORAL
Status: DISCONTINUED | OUTPATIENT
Start: 2018-09-25 | End: 2018-09-24

## 2018-09-24 RX ADMIN — GABAPENTIN 300 MG: 300 CAPSULE ORAL at 20:57

## 2018-09-24 RX ADMIN — POLYETHYLENE GLYCOL 3350 17 G: 17 POWDER, FOR SOLUTION ORAL at 08:24

## 2018-09-24 RX ADMIN — APIXABAN 5 MG: 5 TABLET, FILM COATED ORAL at 08:24

## 2018-09-24 RX ADMIN — GABAPENTIN 300 MG: 300 CAPSULE ORAL at 08:23

## 2018-09-24 RX ADMIN — BUDESONIDE 500 MCG: 0.5 SUSPENSION RESPIRATORY (INHALATION) at 08:38

## 2018-09-24 RX ADMIN — IPRATROPIUM BROMIDE AND ALBUTEROL SULFATE 1 AMPULE: .5; 3 SOLUTION RESPIRATORY (INHALATION) at 21:31

## 2018-09-24 RX ADMIN — GABAPENTIN 300 MG: 300 CAPSULE ORAL at 12:43

## 2018-09-24 RX ADMIN — LISINOPRIL 40 MG: 20 TABLET ORAL at 10:03

## 2018-09-24 RX ADMIN — ONDANSETRON 4 MG: 2 INJECTION INTRAMUSCULAR; INTRAVENOUS at 15:22

## 2018-09-24 RX ADMIN — CEFEPIME HYDROCHLORIDE 2 G: 2 INJECTION, POWDER, FOR SOLUTION INTRAVENOUS at 11:21

## 2018-09-24 RX ADMIN — DILTIAZEM HYDROCHLORIDE 120 MG: 120 CAPSULE, COATED, EXTENDED RELEASE ORAL at 08:23

## 2018-09-24 RX ADMIN — OXYCODONE AND ACETAMINOPHEN 1 TABLET: 5; 325 TABLET ORAL at 20:57

## 2018-09-24 RX ADMIN — SIMETHICONE 40 MG: 20 SUSPENSION/ DROPS ORAL at 12:44

## 2018-09-24 RX ADMIN — PANCRELIPASE 1 CAPSULE: 24000; 76000; 120000 CAPSULE, DELAYED RELEASE PELLETS ORAL at 08:23

## 2018-09-24 RX ADMIN — DOCUSATE SODIUM 100 MG: 100 CAPSULE, LIQUID FILLED ORAL at 08:23

## 2018-09-24 RX ADMIN — ALPRAZOLAM 0.25 MG: 0.25 TABLET ORAL at 20:57

## 2018-09-24 RX ADMIN — PANCRELIPASE 1 CAPSULE: 24000; 76000; 120000 CAPSULE, DELAYED RELEASE PELLETS ORAL at 17:35

## 2018-09-24 RX ADMIN — FAMOTIDINE 20 MG: 10 INJECTION, SOLUTION INTRAVENOUS at 08:24

## 2018-09-24 RX ADMIN — APIXABAN 5 MG: 5 TABLET, FILM COATED ORAL at 20:57

## 2018-09-24 RX ADMIN — MORPHINE SULFATE 1 MG: 2 INJECTION, SOLUTION INTRAMUSCULAR; INTRAVENOUS at 03:50

## 2018-09-24 RX ADMIN — PANTOPRAZOLE SODIUM 40 MG: 40 TABLET, DELAYED RELEASE ORAL at 17:35

## 2018-09-24 RX ADMIN — PANCRELIPASE 1 CAPSULE: 24000; 76000; 120000 CAPSULE, DELAYED RELEASE PELLETS ORAL at 11:21

## 2018-09-24 RX ADMIN — Medication 10 ML: at 08:24

## 2018-09-24 RX ADMIN — FUROSEMIDE 40 MG: 40 TABLET ORAL at 08:23

## 2018-09-24 RX ADMIN — IPRATROPIUM BROMIDE AND ALBUTEROL SULFATE 1 AMPULE: .5; 3 SOLUTION RESPIRATORY (INHALATION) at 08:37

## 2018-09-24 RX ADMIN — Medication 10 ML: at 20:59

## 2018-09-24 RX ADMIN — CEFEPIME HYDROCHLORIDE 2 G: 2 INJECTION, POWDER, FOR SOLUTION INTRAVENOUS at 20:57

## 2018-09-24 RX ADMIN — OXYCODONE AND ACETAMINOPHEN 1 TABLET: 5; 325 TABLET ORAL at 15:20

## 2018-09-24 RX ADMIN — BUDESONIDE 500 MCG: 0.5 SUSPENSION RESPIRATORY (INHALATION) at 21:31

## 2018-09-24 RX ADMIN — AMLODIPINE BESYLATE 5 MG: 5 TABLET ORAL at 08:23

## 2018-09-24 RX ADMIN — OXYCODONE AND ACETAMINOPHEN 1 TABLET: 5; 325 TABLET ORAL at 11:22

## 2018-09-24 RX ADMIN — FAMOTIDINE 20 MG: 10 INJECTION, SOLUTION INTRAVENOUS at 20:57

## 2018-09-24 ASSESSMENT — PAIN SCALES - GENERAL
PAINLEVEL_OUTOF10: 7
PAINLEVEL_OUTOF10: 5
PAINLEVEL_OUTOF10: 5
PAINLEVEL_OUTOF10: 7
PAINLEVEL_OUTOF10: 6
PAINLEVEL_OUTOF10: 5
PAINLEVEL_OUTOF10: 4
PAINLEVEL_OUTOF10: 8
PAINLEVEL_OUTOF10: 8
PAINLEVEL_OUTOF10: 5
PAINLEVEL_OUTOF10: 6
PAINLEVEL_OUTOF10: 4
PAINLEVEL_OUTOF10: 4

## 2018-09-24 ASSESSMENT — PAIN DESCRIPTION - PAIN TYPE
TYPE: ACUTE PAIN
TYPE: CHRONIC PAIN
TYPE: CHRONIC PAIN

## 2018-09-24 ASSESSMENT — PAIN DESCRIPTION - LOCATION
LOCATION: BACK

## 2018-09-24 ASSESSMENT — PAIN DESCRIPTION - ORIENTATION
ORIENTATION: LEFT
ORIENTATION: LEFT

## 2018-09-24 ASSESSMENT — PAIN DESCRIPTION - DESCRIPTORS: DESCRIPTORS: ACHING

## 2018-09-24 NOTE — CONSULTS
Nutrition Assessment    Type and Reason for Visit: Consult    Nutrition Recommendations:   Liberalize diet. Continue to monitor PO intakes, weights, I&O's, skin integrity, and nutrition-related labs. Malnutrition Assessment:  · Malnutrition Status: Meets the criteria for severe malnutrition  · Context: Acute illness or injury  · Findings of the 6 clinical characteristics of malnutrition (Minimum of 2 out of 6 clinical characteristics is required to make the diagnosis of moderate or severe Protein Calorie Malnutrition based on AND/ASPEN Guidelines):  1. Energy Intake-Less than or equal to 50%, greater than 7 days    2. Weight Loss-7.5% loss or greater, in 1 week  3. Fat Loss-Moderate subcutaneous fat loss, Orbital, Triceps  4. Muscle Loss-Moderate muscle mass loss, Temples (temporalis muscle)  5. Fluid Accumulation-No significant fluid accumulation,    6.  Strength-Not measured    Nutrition Diagnosis:   · Problem: Inadequate oral intake  · Etiology: related to Insufficient energy/nutrient consumption     Signs and symptoms:  as evidenced by Weight loss 1-2% in 1 week, Moderate loss of subcutaneous fat, Moderate muscle loss    Nutrition Assessment:  · Subjective Assessment: Consult for \"poor intake/appetite\". Pt admitted for SOB. Pt with PMH of COPD, gastric ulcers, HTN, malnutrition. Pt found to have pneumonia. Documented intakes <50%. Pt states that he has had a poor appetite, even PTA but unsure of reason. Pt reported having to force himself to eat the past few days. Offered supplements but pt strongly declined. No recent wts in EMR. Pt states GMQ=690#, EQC=094#. Pt would benefit from liberlized diet.   · Nutrition-Focused Physical Findings: BM 9/22; -5.6 Liters fluid  · Wound Type: None  · Current Nutrition Therapies:  · Oral Diet Orders: Cardiac   · Oral Diet intake: 1-25%, 26-50%  · Oral Nutrition Supplement (ONS) Orders: None  · Anthropometric Measures:  · Ht: 5' 8\" (172.7 cm)   · Current Body Wt:

## 2018-09-24 NOTE — PROGRESS NOTES
Subjective   General  Chart Reviewed: Yes  Additional Pertinent Hx: Per Dr. Merritt Pals note 9/21/18: \"The patient is a 67 y.o. male 's medical history of COPD, gastric ulcers, who presents to [facility] with fatigue and feeling sick. He is a resident of a Snf, got a CXR there yesterday, was found to have a pneumonia abd started on abx. Over the course of the day he got worse and was sent to ER. Has severe L lower chest pain and fever. He was admitted with acute hypoxic respiratory failure, sepsis, pneumonia and hypertension. \"   Family / Caregiver Present: No  Referring Practitioner: Inocencio Riggins  Diagnosis: PNA, sepsis, acute hypoxic respiratory failure, Acute COPD exacerbation, HTN, gastritis  Subjective  Subjective: Pt met b/s for OT eval/tx with PT. Pt supine in bed on arrival, initially refusing therapy \"I don't want to do any therapy while I'm here. I'm here to get better, and I don't want anyone bugging me. \" With max verbal encouragement, pt agreeable to participate in evaluation only. Pt c/o lack of appetite. Pt denies pain.    Pain Assessment  Patient Currently in Pain: No  Pain Assessment: 0-10  Pain Level: 4  Pain Type: Acute pain  Pain Location: Back  Pain Orientation: Left, Mid, Lower  Pain Descriptors: Aching  Pain Frequency: Continuous  Clinical Progression: Gradually improving  Patient's Stated Pain Goal: 3  Pain Intervention(s): Medication (see eMar)  Response to Pain Intervention: Asleep with RR greater than 10  Oxygen Therapy  SpO2: 96 %  O2 Device: Nasal cannula  Social/Functional History  Social/Functional History  Type of Home: Facility ([facility])  ADL Assistance: Independent  Ambulation Assistance: Independent (using w/c like a walker)  Transfer Assistance: Independent       Objective   Vision: Impaired  Vision Exceptions: Wears glasses at all times  Hearing: Within functional limits      Orientation  Overall Orientation Status:  (NT d/t agitation with eval) Balance  Sitting Balance: Supervision (seated EOB)  Standing Balance: Stand by assistance  Standing Balance  Functional Mobility  Functional - Mobility Device: Rolling Walker  Assist Level: Stand by assistance  Functional Mobility Comments: Pt completed fxl mobility ~3-4 steps fowards/backwards with RW and SBA. Pt c/o SOB upon standing. O2 sats on 3.5 L O2 97%. ADL  UE Dressing: Stand by assistance (to change hospital gown)  Additional Comments: Anticipate pt is independent feeding, set-up/supervision UB dressing/bathing, min A LB bathing, toileting based on ROM/strength, balance, endurance. Bed mobility  Supine to Sit: Supervision  Sit to Supine: Supervision     Transfers  Sit to stand: Stand by assistance  Stand to sit: Stand by assistance     Vision - Basic Assessment  Prior Vision: Wears glasses all the time     Cognition  Insights: Decreased awareness of deficits       LUE AROM (degrees)  LUE General AROM: not formally assessed, but observed to be grossly [facility] for purpose of ADLs. RUE AROM (degrees)  RUE General AROM: not formally assessed, but [facility] purpose of ADLs. Assessment   Performance deficits / Impairments: Decreased functional mobility ; Decreased ADL status; Decreased endurance  Assessment: Pt is a 67 y.o. male admitted with PNA, sepsis, acute hypoxic respiratory failure, Acute COPD exacerbation, HTN, gastritis. At baseline, pt LTC resident at [facility], and reports mod I ADLs and fxl mobility using w/c like walker. Pt currently functioning below baseline d/t decreased endurance, fxl mobility, and strength and would benefit from ongoing acute OT to address these limitations, however, pt adamant that he does not want any therapy services while in hospital or at [facility].  Will discharge per pt request.   Prognosis: Fair  Decision Making: Low Complexity  History: see above  Exam: decreased ADL status, endurance, strength, fxl mobility,
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
## 2018-09-25 LAB
ANION GAP SERPL CALCULATED.3IONS-SCNC: 11 MMOL/L (ref 3–16)
BUN BLDV-MCNC: 41 MG/DL (ref 7–20)
CALCIUM SERPL-MCNC: 8.8 MG/DL (ref 8.3–10.6)
CHLORIDE BLD-SCNC: 96 MMOL/L (ref 99–110)
CO2: 31 MMOL/L (ref 21–32)
CREAT SERPL-MCNC: 1.7 MG/DL (ref 0.8–1.3)
EKG ATRIAL RATE: 93 BPM
EKG DIAGNOSIS: NORMAL
EKG P AXIS: 66 DEGREES
EKG P-R INTERVAL: 124 MS
EKG Q-T INTERVAL: 336 MS
EKG QRS DURATION: 72 MS
EKG QTC CALCULATION (BAZETT): 417 MS
EKG R AXIS: 29 DEGREES
EKG T AXIS: 63 DEGREES
EKG VENTRICULAR RATE: 93 BPM
GFR AFRICAN AMERICAN: 48
GFR NON-AFRICAN AMERICAN: 40
GLUCOSE BLD-MCNC: 92 MG/DL (ref 70–99)
HCT VFR BLD CALC: 34.4 % (ref 40.5–52.5)
HEMOGLOBIN: 10.7 G/DL (ref 13.5–17.5)
MCH RBC QN AUTO: 29.4 PG (ref 26–34)
MCHC RBC AUTO-ENTMCNC: 31 G/DL (ref 31–36)
MCV RBC AUTO: 94.6 FL (ref 80–100)
PDW BLD-RTO: 15.1 % (ref 12.4–15.4)
PLATELET # BLD: 328 K/UL (ref 135–450)
PMV BLD AUTO: 7.3 FL (ref 5–10.5)
POTASSIUM REFLEX MAGNESIUM: 4.5 MMOL/L (ref 3.5–5.1)
RBC # BLD: 3.64 M/UL (ref 4.2–5.9)
SODIUM BLD-SCNC: 138 MMOL/L (ref 136–145)
TROPONIN: 0.01 NG/ML
WBC # BLD: 10.6 K/UL (ref 4–11)

## 2018-09-25 PROCEDURE — 87086 URINE CULTURE/COLONY COUNT: CPT

## 2018-09-25 PROCEDURE — 93010 ELECTROCARDIOGRAM REPORT: CPT | Performed by: INTERNAL MEDICINE

## 2018-09-25 PROCEDURE — 6370000000 HC RX 637 (ALT 250 FOR IP): Performed by: HOSPITALIST

## 2018-09-25 PROCEDURE — 94761 N-INVAS EAR/PLS OXIMETRY MLT: CPT

## 2018-09-25 PROCEDURE — 36415 COLL VENOUS BLD VENIPUNCTURE: CPT

## 2018-09-25 PROCEDURE — 2580000003 HC RX 258: Performed by: HOSPITALIST

## 2018-09-25 PROCEDURE — 2580000003 HC RX 258: Performed by: INTERNAL MEDICINE

## 2018-09-25 PROCEDURE — 6360000002 HC RX W HCPCS: Performed by: INTERNAL MEDICINE

## 2018-09-25 PROCEDURE — 93005 ELECTROCARDIOGRAM TRACING: CPT | Performed by: HOSPITALIST

## 2018-09-25 PROCEDURE — 94668 MNPJ CHEST WALL SBSQ: CPT

## 2018-09-25 PROCEDURE — 2700000000 HC OXYGEN THERAPY PER DAY

## 2018-09-25 PROCEDURE — 85027 COMPLETE CBC AUTOMATED: CPT

## 2018-09-25 PROCEDURE — 84484 ASSAY OF TROPONIN QUANT: CPT

## 2018-09-25 PROCEDURE — 80048 BASIC METABOLIC PNL TOTAL CA: CPT

## 2018-09-25 PROCEDURE — 6370000000 HC RX 637 (ALT 250 FOR IP): Performed by: PHYSICIAN ASSISTANT

## 2018-09-25 PROCEDURE — 94640 AIRWAY INHALATION TREATMENT: CPT

## 2018-09-25 PROCEDURE — 6370000000 HC RX 637 (ALT 250 FOR IP): Performed by: INTERNAL MEDICINE

## 2018-09-25 PROCEDURE — 94760 N-INVAS EAR/PLS OXIMETRY 1: CPT

## 2018-09-25 PROCEDURE — 1200000000 HC SEMI PRIVATE

## 2018-09-25 RX ORDER — SODIUM CHLORIDE 9 MG/ML
INJECTION, SOLUTION INTRAVENOUS CONTINUOUS
Status: DISPENSED | OUTPATIENT
Start: 2018-09-25 | End: 2018-09-26

## 2018-09-25 RX ORDER — CALCIUM CARBONATE 200(500)MG
500 TABLET,CHEWABLE ORAL 3 TIMES DAILY PRN
Status: DISCONTINUED | OUTPATIENT
Start: 2018-09-25 | End: 2018-09-29 | Stop reason: HOSPADM

## 2018-09-25 RX ADMIN — FUROSEMIDE 40 MG: 40 TABLET ORAL at 08:46

## 2018-09-25 RX ADMIN — PANCRELIPASE 1 CAPSULE: 24000; 76000; 120000 CAPSULE, DELAYED RELEASE PELLETS ORAL at 12:21

## 2018-09-25 RX ADMIN — Medication 10 ML: at 08:46

## 2018-09-25 RX ADMIN — IPRATROPIUM BROMIDE AND ALBUTEROL SULFATE 1 AMPULE: .5; 3 SOLUTION RESPIRATORY (INHALATION) at 09:05

## 2018-09-25 RX ADMIN — SODIUM CHLORIDE: 9 INJECTION, SOLUTION INTRAVENOUS at 10:18

## 2018-09-25 RX ADMIN — GABAPENTIN 300 MG: 300 CAPSULE ORAL at 12:21

## 2018-09-25 RX ADMIN — PANTOPRAZOLE SODIUM 40 MG: 40 TABLET, DELAYED RELEASE ORAL at 06:25

## 2018-09-25 RX ADMIN — ANTACID TABLETS 500 MG: 500 TABLET, CHEWABLE ORAL at 15:26

## 2018-09-25 RX ADMIN — SIMETHICONE 40 MG: 20 SUSPENSION/ DROPS ORAL at 08:36

## 2018-09-25 RX ADMIN — SIMETHICONE 40 MG: 20 SUSPENSION/ DROPS ORAL at 14:41

## 2018-09-25 RX ADMIN — DILTIAZEM HYDROCHLORIDE 120 MG: 120 CAPSULE, COATED, EXTENDED RELEASE ORAL at 08:46

## 2018-09-25 RX ADMIN — OXYCODONE AND ACETAMINOPHEN 1 TABLET: 5; 325 TABLET ORAL at 03:45

## 2018-09-25 RX ADMIN — BUDESONIDE 500 MCG: 0.5 SUSPENSION RESPIRATORY (INHALATION) at 21:07

## 2018-09-25 RX ADMIN — CEFEPIME HYDROCHLORIDE 2 G: 2 INJECTION, POWDER, FOR SOLUTION INTRAVENOUS at 08:47

## 2018-09-25 RX ADMIN — APIXABAN 5 MG: 5 TABLET, FILM COATED ORAL at 20:51

## 2018-09-25 RX ADMIN — APIXABAN 5 MG: 5 TABLET, FILM COATED ORAL at 08:46

## 2018-09-25 RX ADMIN — OXYCODONE AND ACETAMINOPHEN 1 TABLET: 5; 325 TABLET ORAL at 14:45

## 2018-09-25 RX ADMIN — BUDESONIDE 500 MCG: 0.5 SUSPENSION RESPIRATORY (INHALATION) at 09:05

## 2018-09-25 RX ADMIN — PANCRELIPASE 1 CAPSULE: 24000; 76000; 120000 CAPSULE, DELAYED RELEASE PELLETS ORAL at 16:43

## 2018-09-25 RX ADMIN — IPRATROPIUM BROMIDE AND ALBUTEROL SULFATE 1 AMPULE: .5; 3 SOLUTION RESPIRATORY (INHALATION) at 21:07

## 2018-09-25 RX ADMIN — PANCRELIPASE 1 CAPSULE: 24000; 76000; 120000 CAPSULE, DELAYED RELEASE PELLETS ORAL at 08:46

## 2018-09-25 RX ADMIN — PANTOPRAZOLE SODIUM 40 MG: 40 TABLET, DELAYED RELEASE ORAL at 14:45

## 2018-09-25 RX ADMIN — GABAPENTIN 300 MG: 300 CAPSULE ORAL at 08:46

## 2018-09-25 RX ADMIN — OXYCODONE AND ACETAMINOPHEN 1 TABLET: 5; 325 TABLET ORAL at 08:46

## 2018-09-25 RX ADMIN — GABAPENTIN 300 MG: 300 CAPSULE ORAL at 20:51

## 2018-09-25 RX ADMIN — Medication 10 ML: at 20:52

## 2018-09-25 ASSESSMENT — PAIN DESCRIPTION - PAIN TYPE
TYPE: CHRONIC PAIN

## 2018-09-25 ASSESSMENT — PAIN DESCRIPTION - LOCATION
LOCATION: BACK

## 2018-09-25 ASSESSMENT — PAIN SCALES - GENERAL
PAINLEVEL_OUTOF10: 5
PAINLEVEL_OUTOF10: 8
PAINLEVEL_OUTOF10: 6
PAINLEVEL_OUTOF10: 4
PAINLEVEL_OUTOF10: 6
PAINLEVEL_OUTOF10: 7
PAINLEVEL_OUTOF10: 2

## 2018-09-25 ASSESSMENT — PAIN DESCRIPTION - ORIENTATION
ORIENTATION: LEFT
ORIENTATION: LEFT

## 2018-09-25 NOTE — PROGRESS NOTES
IVF initiated at 75 ml/hr in left forearm. Patient supplied with new urinal. Advised to inform nurse the next time he urinates. Patient agreeable.  Electronically signed by Jeimy Guan RN on 9/25/2018 at 10:26 AM
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
## 2018-09-26 LAB
AMORPHOUS: ABNORMAL /HPF
ANION GAP SERPL CALCULATED.3IONS-SCNC: 6 MMOL/L (ref 3–16)
BASOPHILS ABSOLUTE: 0 K/UL (ref 0–0.2)
BASOPHILS RELATIVE PERCENT: 0.3 %
BILIRUBIN URINE: NEGATIVE
BLOOD, URINE: ABNORMAL
BUN BLDV-MCNC: 39 MG/DL (ref 7–20)
CALCIUM SERPL-MCNC: 8.5 MG/DL (ref 8.3–10.6)
CHLORIDE BLD-SCNC: 98 MMOL/L (ref 99–110)
CLARITY: ABNORMAL
CO2: 32 MMOL/L (ref 21–32)
COLOR: YELLOW
CREAT SERPL-MCNC: 1.6 MG/DL (ref 0.8–1.3)
EOSINOPHILS ABSOLUTE: 0.4 K/UL (ref 0–0.6)
EOSINOPHILS RELATIVE PERCENT: 2.5 %
EPITHELIAL CELLS, UA: 4 /HPF (ref 0–5)
GFR AFRICAN AMERICAN: 52
GFR NON-AFRICAN AMERICAN: 43
GLUCOSE BLD-MCNC: 106 MG/DL (ref 70–99)
GLUCOSE URINE: NEGATIVE MG/DL
HCT VFR BLD CALC: 29.4 % (ref 40.5–52.5)
HEMOGLOBIN: 9 G/DL (ref 13.5–17.5)
HYALINE CASTS: 19 /LPF (ref 0–8)
KETONES, URINE: NEGATIVE MG/DL
LEUKOCYTE ESTERASE, URINE: NEGATIVE
LYMPHOCYTES ABSOLUTE: 1.3 K/UL (ref 1–5.1)
LYMPHOCYTES RELATIVE PERCENT: 8.8 %
MAGNESIUM: 2.4 MG/DL (ref 1.8–2.4)
MCH RBC QN AUTO: 28.7 PG (ref 26–34)
MCHC RBC AUTO-ENTMCNC: 30.6 G/DL (ref 31–36)
MCV RBC AUTO: 94 FL (ref 80–100)
MICROSCOPIC EXAMINATION: YES
MONOCYTES ABSOLUTE: 1.2 K/UL (ref 0–1.3)
MONOCYTES RELATIVE PERCENT: 8.3 %
NEUTROPHILS ABSOLUTE: 11.5 K/UL (ref 1.7–7.7)
NEUTROPHILS RELATIVE PERCENT: 80.1 %
NITRITE, URINE: NEGATIVE
PDW BLD-RTO: 15.1 % (ref 12.4–15.4)
PH UA: 5.5
PLATELET # BLD: 336 K/UL (ref 135–450)
PMV BLD AUTO: 7.4 FL (ref 5–10.5)
POTASSIUM REFLEX MAGNESIUM: 4.3 MMOL/L (ref 3.5–5.1)
PROTEIN UA: 30 MG/DL
RBC # BLD: 3.13 M/UL (ref 4.2–5.9)
RBC UA: 2 /HPF (ref 0–4)
SODIUM BLD-SCNC: 136 MMOL/L (ref 136–145)
SPECIFIC GRAVITY UA: 1.02
URINE REFLEX TO CULTURE: YES
URINE TYPE: ABNORMAL
UROBILINOGEN, URINE: 0.2 E.U./DL
WBC # BLD: 14.4 K/UL (ref 4–11)
WBC UA: 10 /HPF (ref 0–5)
YEAST: PRESENT /HPF

## 2018-09-26 PROCEDURE — 6370000000 HC RX 637 (ALT 250 FOR IP): Performed by: HOSPITALIST

## 2018-09-26 PROCEDURE — 94668 MNPJ CHEST WALL SBSQ: CPT

## 2018-09-26 PROCEDURE — 51798 US URINE CAPACITY MEASURE: CPT

## 2018-09-26 PROCEDURE — 2700000000 HC OXYGEN THERAPY PER DAY

## 2018-09-26 PROCEDURE — 80048 BASIC METABOLIC PNL TOTAL CA: CPT

## 2018-09-26 PROCEDURE — 2580000003 HC RX 258: Performed by: HOSPITALIST

## 2018-09-26 PROCEDURE — 6370000000 HC RX 637 (ALT 250 FOR IP): Performed by: INTERNAL MEDICINE

## 2018-09-26 PROCEDURE — 85025 COMPLETE CBC W/AUTO DIFF WBC: CPT

## 2018-09-26 PROCEDURE — 6370000000 HC RX 637 (ALT 250 FOR IP): Performed by: PHYSICIAN ASSISTANT

## 2018-09-26 PROCEDURE — 2580000003 HC RX 258: Performed by: INTERNAL MEDICINE

## 2018-09-26 PROCEDURE — 81001 URINALYSIS AUTO W/SCOPE: CPT

## 2018-09-26 PROCEDURE — 36415 COLL VENOUS BLD VENIPUNCTURE: CPT

## 2018-09-26 PROCEDURE — 1200000000 HC SEMI PRIVATE

## 2018-09-26 PROCEDURE — 94640 AIRWAY INHALATION TREATMENT: CPT

## 2018-09-26 PROCEDURE — 6360000002 HC RX W HCPCS: Performed by: INTERNAL MEDICINE

## 2018-09-26 PROCEDURE — 94761 N-INVAS EAR/PLS OXIMETRY MLT: CPT

## 2018-09-26 PROCEDURE — 83735 ASSAY OF MAGNESIUM: CPT

## 2018-09-26 RX ORDER — SODIUM CHLORIDE 9 MG/ML
INJECTION, SOLUTION INTRAVENOUS CONTINUOUS
Status: DISCONTINUED | OUTPATIENT
Start: 2018-09-26 | End: 2018-09-27

## 2018-09-26 RX ADMIN — APIXABAN 5 MG: 5 TABLET, FILM COATED ORAL at 08:45

## 2018-09-26 RX ADMIN — DILTIAZEM HYDROCHLORIDE 120 MG: 120 CAPSULE, COATED, EXTENDED RELEASE ORAL at 08:45

## 2018-09-26 RX ADMIN — OXYCODONE AND ACETAMINOPHEN 1 TABLET: 5; 325 TABLET ORAL at 04:28

## 2018-09-26 RX ADMIN — IPRATROPIUM BROMIDE AND ALBUTEROL SULFATE 1 AMPULE: .5; 3 SOLUTION RESPIRATORY (INHALATION) at 21:04

## 2018-09-26 RX ADMIN — PANCRELIPASE 1 CAPSULE: 24000; 76000; 120000 CAPSULE, DELAYED RELEASE PELLETS ORAL at 08:46

## 2018-09-26 RX ADMIN — PANTOPRAZOLE SODIUM 40 MG: 40 TABLET, DELAYED RELEASE ORAL at 04:28

## 2018-09-26 RX ADMIN — ANTACID TABLETS 500 MG: 500 TABLET, CHEWABLE ORAL at 08:45

## 2018-09-26 RX ADMIN — PANCRELIPASE 1 CAPSULE: 24000; 76000; 120000 CAPSULE, DELAYED RELEASE PELLETS ORAL at 12:00

## 2018-09-26 RX ADMIN — IPRATROPIUM BROMIDE AND ALBUTEROL SULFATE 1 AMPULE: .5; 3 SOLUTION RESPIRATORY (INHALATION) at 09:08

## 2018-09-26 RX ADMIN — BUDESONIDE 500 MCG: 0.5 SUSPENSION RESPIRATORY (INHALATION) at 09:08

## 2018-09-26 RX ADMIN — SODIUM CHLORIDE: 9 INJECTION, SOLUTION INTRAVENOUS at 12:00

## 2018-09-26 RX ADMIN — PANCRELIPASE 1 CAPSULE: 24000; 76000; 120000 CAPSULE, DELAYED RELEASE PELLETS ORAL at 16:54

## 2018-09-26 RX ADMIN — Medication 10 ML: at 22:36

## 2018-09-26 RX ADMIN — BUDESONIDE 500 MCG: 0.5 SUSPENSION RESPIRATORY (INHALATION) at 21:05

## 2018-09-26 RX ADMIN — GABAPENTIN 300 MG: 300 CAPSULE ORAL at 22:36

## 2018-09-26 RX ADMIN — SIMETHICONE 40 MG: 20 SUSPENSION/ DROPS ORAL at 13:41

## 2018-09-26 RX ADMIN — ANTACID TABLETS 500 MG: 500 TABLET, CHEWABLE ORAL at 13:41

## 2018-09-26 RX ADMIN — SODIUM CHLORIDE: 9 INJECTION, SOLUTION INTRAVENOUS at 00:21

## 2018-09-26 RX ADMIN — APIXABAN 5 MG: 5 TABLET, FILM COATED ORAL at 22:36

## 2018-09-26 RX ADMIN — Medication 10 ML: at 08:46

## 2018-09-26 RX ADMIN — OXYCODONE AND ACETAMINOPHEN 1 TABLET: 5; 325 TABLET ORAL at 16:54

## 2018-09-26 RX ADMIN — PANTOPRAZOLE SODIUM 40 MG: 40 TABLET, DELAYED RELEASE ORAL at 16:54

## 2018-09-26 RX ADMIN — OXYCODONE AND ACETAMINOPHEN 1 TABLET: 5; 325 TABLET ORAL at 08:45

## 2018-09-26 RX ADMIN — OXYCODONE AND ACETAMINOPHEN 1 TABLET: 5; 325 TABLET ORAL at 13:03

## 2018-09-26 RX ADMIN — GABAPENTIN 300 MG: 300 CAPSULE ORAL at 12:00

## 2018-09-26 RX ADMIN — GABAPENTIN 300 MG: 300 CAPSULE ORAL at 08:45

## 2018-09-26 ASSESSMENT — PAIN SCALES - GENERAL
PAINLEVEL_OUTOF10: 2
PAINLEVEL_OUTOF10: 2
PAINLEVEL_OUTOF10: 7
PAINLEVEL_OUTOF10: 4
PAINLEVEL_OUTOF10: 0
PAINLEVEL_OUTOF10: 6
PAINLEVEL_OUTOF10: 6
PAINLEVEL_OUTOF10: 0

## 2018-09-26 NOTE — PROGRESS NOTES
Hospitalist Progress Note    CC:   Shortness of breath     Admit date: 9/18/2018  Days in hospital:  8    Subjective:     He was feeling better today. He thinks his abdominal pain and nausea are better. He reports some tremors. ROS:   Review of Systems   Constitutional: Negative for chills and fever. Respiratory: Negative for cough and shortness of breath. Cardiovascular: Negative for chest pain and palpitations. Genitourinary: Negative for dysuria and urgency. Neurological: Negative for headaches. Objective:    BP (!) 99/49   Pulse 86   Temp 99.2 °F (37.3 °C) (Oral)   Resp 20   Ht 5' 8\" (1.727 m)   Wt 111 lb 8.8 oz (50.6 kg)   SpO2 96%   BMI 16.96 kg/m²     Gen: alert, NAD  HEENT: NC/AT, moist mucous membranes, no oropharyngeal erythema or exudate  Neck: supple, trachea midline, no anterior cervical or SC LAD  Heart: Normal s1/s2, RRR, no murmurs, gallops, or rubs. Lungs: CTA bilaterally . No crackles. No use of accessory muscles. Abd: bowel sounds present, no distention. Mild epigastric tenderness. No rebound or rigidity. Extrem: No clubbing, cyanosis, No edema  Psych: A & O x3 , affect appropriate  Neuro: grossly intact, moves all four extremities spontaneously. Fine resting tremors.        Medications:  Scheduled Meds:   ipratropium-albuterol  1 ampule Inhalation BID    pantoprazole  40 mg Oral BID AC    diltiazem  120 mg Oral Daily    gabapentin  300 mg Oral TID    sodium chloride flush  10 mL Intravenous 2 times per day    apixaban  5 mg Oral BID    lipase-protease-amylase  1 capsule Oral TID WC    budesonide  0.5 mg Nebulization BID       PRN Meds:  calcium carbonate, oxyCODONE-acetaminophen, simethicone, ALPRAZolam, enalaprilat, guaiFENesin-dextromethorphan, sodium chloride flush, magnesium hydroxide, ondansetron, acetaminophen    IV:   sodium chloride 75 mL/hr at 09/26/18 0021         Intake/Output Summary (Last 24 hours) at 09/26/18 0818  Last data

## 2018-09-27 LAB
ANION GAP SERPL CALCULATED.3IONS-SCNC: 10 MMOL/L (ref 3–16)
BASOPHILS ABSOLUTE: 0 K/UL (ref 0–0.2)
BASOPHILS RELATIVE PERCENT: 0.4 %
BUN BLDV-MCNC: 19 MG/DL (ref 7–20)
CALCIUM SERPL-MCNC: 8.6 MG/DL (ref 8.3–10.6)
CHLORIDE BLD-SCNC: 100 MMOL/L (ref 99–110)
CO2: 28 MMOL/L (ref 21–32)
CREAT SERPL-MCNC: 0.8 MG/DL (ref 0.8–1.3)
EOSINOPHILS ABSOLUTE: 0.3 K/UL (ref 0–0.6)
EOSINOPHILS RELATIVE PERCENT: 2.2 %
GFR AFRICAN AMERICAN: >60
GFR NON-AFRICAN AMERICAN: >60
GLUCOSE BLD-MCNC: 86 MG/DL (ref 70–99)
HCT VFR BLD CALC: 29.2 % (ref 40.5–52.5)
HEMOGLOBIN: 8.9 G/DL (ref 13.5–17.5)
LYMPHOCYTES ABSOLUTE: 1.1 K/UL (ref 1–5.1)
LYMPHOCYTES RELATIVE PERCENT: 9.5 %
MCH RBC QN AUTO: 28.9 PG (ref 26–34)
MCHC RBC AUTO-ENTMCNC: 30.5 G/DL (ref 31–36)
MCV RBC AUTO: 94.6 FL (ref 80–100)
MONOCYTES ABSOLUTE: 1.1 K/UL (ref 0–1.3)
MONOCYTES RELATIVE PERCENT: 9.7 %
NEUTROPHILS ABSOLUTE: 9.1 K/UL (ref 1.7–7.7)
NEUTROPHILS RELATIVE PERCENT: 78.2 %
PDW BLD-RTO: 15.1 % (ref 12.4–15.4)
PLATELET # BLD: 352 K/UL (ref 135–450)
PMV BLD AUTO: 7.6 FL (ref 5–10.5)
POTASSIUM REFLEX MAGNESIUM: 4.6 MMOL/L (ref 3.5–5.1)
RBC # BLD: 3.08 M/UL (ref 4.2–5.9)
SODIUM BLD-SCNC: 138 MMOL/L (ref 136–145)
URINE CULTURE, ROUTINE: NORMAL
WBC # BLD: 11.6 K/UL (ref 4–11)

## 2018-09-27 PROCEDURE — 94760 N-INVAS EAR/PLS OXIMETRY 1: CPT

## 2018-09-27 PROCEDURE — 80048 BASIC METABOLIC PNL TOTAL CA: CPT

## 2018-09-27 PROCEDURE — 85025 COMPLETE CBC W/AUTO DIFF WBC: CPT

## 2018-09-27 PROCEDURE — 6360000002 HC RX W HCPCS: Performed by: INTERNAL MEDICINE

## 2018-09-27 PROCEDURE — 2580000003 HC RX 258: Performed by: HOSPITALIST

## 2018-09-27 PROCEDURE — 6370000000 HC RX 637 (ALT 250 FOR IP): Performed by: PHYSICIAN ASSISTANT

## 2018-09-27 PROCEDURE — 94640 AIRWAY INHALATION TREATMENT: CPT

## 2018-09-27 PROCEDURE — 6370000000 HC RX 637 (ALT 250 FOR IP): Performed by: HOSPITALIST

## 2018-09-27 PROCEDURE — 6370000000 HC RX 637 (ALT 250 FOR IP): Performed by: INTERNAL MEDICINE

## 2018-09-27 PROCEDURE — 1200000000 HC SEMI PRIVATE

## 2018-09-27 PROCEDURE — 2700000000 HC OXYGEN THERAPY PER DAY

## 2018-09-27 PROCEDURE — 2580000003 HC RX 258: Performed by: INTERNAL MEDICINE

## 2018-09-27 RX ORDER — IPRATROPIUM BROMIDE AND ALBUTEROL SULFATE 2.5; .5 MG/3ML; MG/3ML
1 SOLUTION RESPIRATORY (INHALATION) EVERY 4 HOURS PRN
Status: DISCONTINUED | OUTPATIENT
Start: 2018-09-27 | End: 2018-09-29 | Stop reason: HOSPADM

## 2018-09-27 RX ORDER — TRAZODONE HYDROCHLORIDE 50 MG/1
50 TABLET ORAL NIGHTLY
Status: DISCONTINUED | OUTPATIENT
Start: 2018-09-27 | End: 2018-09-29 | Stop reason: HOSPADM

## 2018-09-27 RX ORDER — AMLODIPINE BESYLATE 5 MG/1
2.5 TABLET ORAL DAILY
Status: DISCONTINUED | OUTPATIENT
Start: 2018-09-27 | End: 2018-09-29 | Stop reason: HOSPADM

## 2018-09-27 RX ADMIN — ANTACID TABLETS 500 MG: 500 TABLET, CHEWABLE ORAL at 07:44

## 2018-09-27 RX ADMIN — AMLODIPINE BESYLATE 2.5 MG: 5 TABLET ORAL at 10:57

## 2018-09-27 RX ADMIN — APIXABAN 5 MG: 5 TABLET, FILM COATED ORAL at 07:44

## 2018-09-27 RX ADMIN — PANCRELIPASE 1 CAPSULE: 24000; 76000; 120000 CAPSULE, DELAYED RELEASE PELLETS ORAL at 10:57

## 2018-09-27 RX ADMIN — GABAPENTIN 300 MG: 300 CAPSULE ORAL at 12:58

## 2018-09-27 RX ADMIN — PANTOPRAZOLE SODIUM 40 MG: 40 TABLET, DELAYED RELEASE ORAL at 15:30

## 2018-09-27 RX ADMIN — OXYCODONE AND ACETAMINOPHEN 1 TABLET: 5; 325 TABLET ORAL at 05:12

## 2018-09-27 RX ADMIN — TRAZODONE HYDROCHLORIDE 50 MG: 50 TABLET ORAL at 21:15

## 2018-09-27 RX ADMIN — DILTIAZEM HYDROCHLORIDE 120 MG: 120 CAPSULE, COATED, EXTENDED RELEASE ORAL at 07:44

## 2018-09-27 RX ADMIN — BUDESONIDE 500 MCG: 0.5 SUSPENSION RESPIRATORY (INHALATION) at 21:23

## 2018-09-27 RX ADMIN — ANTACID TABLETS 500 MG: 500 TABLET, CHEWABLE ORAL at 21:15

## 2018-09-27 RX ADMIN — OXYCODONE AND ACETAMINOPHEN 1 TABLET: 5; 325 TABLET ORAL at 15:30

## 2018-09-27 RX ADMIN — BUDESONIDE 500 MCG: 0.5 SUSPENSION RESPIRATORY (INHALATION) at 08:43

## 2018-09-27 RX ADMIN — Medication 10 ML: at 21:16

## 2018-09-27 RX ADMIN — Medication 10 ML: at 07:43

## 2018-09-27 RX ADMIN — PANCRELIPASE 1 CAPSULE: 24000; 76000; 120000 CAPSULE, DELAYED RELEASE PELLETS ORAL at 15:30

## 2018-09-27 RX ADMIN — GABAPENTIN 300 MG: 300 CAPSULE ORAL at 21:15

## 2018-09-27 RX ADMIN — PANCRELIPASE 1 CAPSULE: 24000; 76000; 120000 CAPSULE, DELAYED RELEASE PELLETS ORAL at 07:44

## 2018-09-27 RX ADMIN — OXYCODONE AND ACETAMINOPHEN 1 TABLET: 5; 325 TABLET ORAL at 08:59

## 2018-09-27 RX ADMIN — GABAPENTIN 300 MG: 300 CAPSULE ORAL at 07:43

## 2018-09-27 RX ADMIN — SODIUM CHLORIDE: 9 INJECTION, SOLUTION INTRAVENOUS at 07:44

## 2018-09-27 RX ADMIN — PANTOPRAZOLE SODIUM 40 MG: 40 TABLET, DELAYED RELEASE ORAL at 05:08

## 2018-09-27 RX ADMIN — APIXABAN 5 MG: 5 TABLET, FILM COATED ORAL at 21:15

## 2018-09-27 RX ADMIN — ONDANSETRON 4 MG: 2 INJECTION INTRAMUSCULAR; INTRAVENOUS at 21:15

## 2018-09-27 RX ADMIN — SIMETHICONE 40 MG: 20 SUSPENSION/ DROPS ORAL at 09:00

## 2018-09-27 ASSESSMENT — PAIN DESCRIPTION - LOCATION: LOCATION: BACK

## 2018-09-27 ASSESSMENT — PAIN SCALES - GENERAL
PAINLEVEL_OUTOF10: 8
PAINLEVEL_OUTOF10: 0
PAINLEVEL_OUTOF10: 5
PAINLEVEL_OUTOF10: 5
PAINLEVEL_OUTOF10: 3
PAINLEVEL_OUTOF10: 7

## 2018-09-27 ASSESSMENT — PAIN DESCRIPTION - PAIN TYPE: TYPE: CHRONIC PAIN

## 2018-09-27 NOTE — CARE COORDINATION
PT IS A LONG TERM RESIDENT AT [facility]. THEY ARE HOLDING HIS LONG TERM, MEDICAID BED AND CAN ACCEPT BACK. NO INSURANCE PRECERTIFICATION REQUIRED. PT IS IN HOSPICE WITH [facility] FOR COPD. WILL CONTINUE TO FOLLOW AND ASSIST.     [address] [facility]  [phone] (CELL)

## 2018-09-27 NOTE — PROGRESS NOTES
Hospitalist Progress Note    CC:   Shortness of breath     Admit date: 9/18/2018  Days in hospital:  9    Subjective:     Breathing is better. Continue to complete from some tremors. ROS:   Review of Systems   Constitutional: Negative for chills and fever. Respiratory: Negative for cough and shortness of breath. Cardiovascular: Negative for chest pain and palpitations. Genitourinary: Negative for dysuria and urgency. Neurological: Negative for headaches. Objective:    BP (!) 148/63   Pulse 90   Temp 98.6 °F (37 °C) (Oral)   Resp 16   Ht 5' 8\" (1.727 m)   Wt 117 lb 11.6 oz (53.4 kg)   SpO2 93%   BMI 17.90 kg/m²     Gen: alert, NAD  HEENT: NC/AT, moist mucous membranes, no oropharyngeal erythema or exudate  Neck: supple, trachea midline, no anterior cervical or SC LAD  Heart: Normal s1/s2, RRR, no murmurs, gallops, or rubs. Lungs: CTA bilaterally . No crackles. No use of accessory muscles. Abd: bowel sounds present, no distention. Mild epigastric tenderness. No rebound or rigidity. Extrem: No clubbing, cyanosis, No edema  Psych: A & O x3 , affect appropriate  Neuro: grossly intact, moves all four extremities spontaneously. Fine resting tremors.        Medications:  Scheduled Meds:   traZODone  50 mg Oral Nightly    pantoprazole  40 mg Oral BID AC    diltiazem  120 mg Oral Daily    gabapentin  300 mg Oral TID    sodium chloride flush  10 mL Intravenous 2 times per day    apixaban  5 mg Oral BID    lipase-protease-amylase  1 capsule Oral TID WC    budesonide  0.5 mg Nebulization BID       PRN Meds:  ipratropium-albuterol, calcium carbonate, oxyCODONE-acetaminophen, simethicone, ALPRAZolam, enalaprilat, guaiFENesin-dextromethorphan, sodium chloride flush, magnesium hydroxide, ondansetron, acetaminophen    IV:        Intake/Output Summary (Last 24 hours) at 09/27/18 1006  Last data filed at 09/27/18 0902   Gross per 24 hour   Intake             2820 ml   Output

## 2018-09-28 LAB
ANION GAP SERPL CALCULATED.3IONS-SCNC: 7 MMOL/L (ref 3–16)
BASOPHILS ABSOLUTE: 0 K/UL (ref 0–0.2)
BASOPHILS RELATIVE PERCENT: 0.2 %
BUN BLDV-MCNC: 12 MG/DL (ref 7–20)
CALCIUM SERPL-MCNC: 8.9 MG/DL (ref 8.3–10.6)
CHLORIDE BLD-SCNC: 97 MMOL/L (ref 99–110)
CO2: 34 MMOL/L (ref 21–32)
CREAT SERPL-MCNC: 0.8 MG/DL (ref 0.8–1.3)
EOSINOPHILS ABSOLUTE: 0.2 K/UL (ref 0–0.6)
EOSINOPHILS RELATIVE PERCENT: 2.2 %
GFR AFRICAN AMERICAN: >60
GFR NON-AFRICAN AMERICAN: >60
GLUCOSE BLD-MCNC: 85 MG/DL (ref 70–99)
HCT VFR BLD CALC: 28.2 % (ref 40.5–52.5)
HEMOGLOBIN: 8.9 G/DL (ref 13.5–17.5)
LYMPHOCYTES ABSOLUTE: 1 K/UL (ref 1–5.1)
LYMPHOCYTES RELATIVE PERCENT: 11.9 %
MCH RBC QN AUTO: 29.4 PG (ref 26–34)
MCHC RBC AUTO-ENTMCNC: 31.4 G/DL (ref 31–36)
MCV RBC AUTO: 93.6 FL (ref 80–100)
MONOCYTES ABSOLUTE: 0.9 K/UL (ref 0–1.3)
MONOCYTES RELATIVE PERCENT: 10.3 %
NEUTROPHILS ABSOLUTE: 6.5 K/UL (ref 1.7–7.7)
NEUTROPHILS RELATIVE PERCENT: 75.4 %
PDW BLD-RTO: 15.1 % (ref 12.4–15.4)
PLATELET # BLD: 368 K/UL (ref 135–450)
PMV BLD AUTO: 7 FL (ref 5–10.5)
POTASSIUM REFLEX MAGNESIUM: 4.3 MMOL/L (ref 3.5–5.1)
RBC # BLD: 3.02 M/UL (ref 4.2–5.9)
SODIUM BLD-SCNC: 138 MMOL/L (ref 136–145)
WBC # BLD: 8.6 K/UL (ref 4–11)

## 2018-09-28 PROCEDURE — 85025 COMPLETE CBC W/AUTO DIFF WBC: CPT

## 2018-09-28 PROCEDURE — 6370000000 HC RX 637 (ALT 250 FOR IP): Performed by: INTERNAL MEDICINE

## 2018-09-28 PROCEDURE — 6360000002 HC RX W HCPCS: Performed by: INTERNAL MEDICINE

## 2018-09-28 PROCEDURE — 80048 BASIC METABOLIC PNL TOTAL CA: CPT

## 2018-09-28 PROCEDURE — 2700000000 HC OXYGEN THERAPY PER DAY

## 2018-09-28 PROCEDURE — 94760 N-INVAS EAR/PLS OXIMETRY 1: CPT

## 2018-09-28 PROCEDURE — 1200000000 HC SEMI PRIVATE

## 2018-09-28 PROCEDURE — 36415 COLL VENOUS BLD VENIPUNCTURE: CPT

## 2018-09-28 PROCEDURE — 6370000000 HC RX 637 (ALT 250 FOR IP): Performed by: PHYSICIAN ASSISTANT

## 2018-09-28 PROCEDURE — 6370000000 HC RX 637 (ALT 250 FOR IP): Performed by: HOSPITALIST

## 2018-09-28 PROCEDURE — 2580000003 HC RX 258: Performed by: INTERNAL MEDICINE

## 2018-09-28 PROCEDURE — 94640 AIRWAY INHALATION TREATMENT: CPT

## 2018-09-28 RX ORDER — POLYETHYLENE GLYCOL 3350 17 G/17G
17 POWDER, FOR SOLUTION ORAL 2 TIMES DAILY
Status: DISCONTINUED | OUTPATIENT
Start: 2018-09-28 | End: 2018-09-29 | Stop reason: HOSPADM

## 2018-09-28 RX ORDER — POLYETHYLENE GLYCOL 3350 17 G/17G
17 POWDER, FOR SOLUTION ORAL DAILY
Status: DISCONTINUED | OUTPATIENT
Start: 2018-09-28 | End: 2018-09-28

## 2018-09-28 RX ADMIN — DILTIAZEM HYDROCHLORIDE 120 MG: 120 CAPSULE, COATED, EXTENDED RELEASE ORAL at 08:18

## 2018-09-28 RX ADMIN — ANTACID TABLETS 500 MG: 500 TABLET, CHEWABLE ORAL at 12:54

## 2018-09-28 RX ADMIN — BUDESONIDE 500 MCG: 0.5 SUSPENSION RESPIRATORY (INHALATION) at 20:29

## 2018-09-28 RX ADMIN — OXYCODONE AND ACETAMINOPHEN 1 TABLET: 5; 325 TABLET ORAL at 07:01

## 2018-09-28 RX ADMIN — GABAPENTIN 300 MG: 300 CAPSULE ORAL at 08:18

## 2018-09-28 RX ADMIN — AMLODIPINE BESYLATE 2.5 MG: 5 TABLET ORAL at 08:18

## 2018-09-28 RX ADMIN — APIXABAN 5 MG: 5 TABLET, FILM COATED ORAL at 20:56

## 2018-09-28 RX ADMIN — PANCRELIPASE 1 CAPSULE: 24000; 76000; 120000 CAPSULE, DELAYED RELEASE PELLETS ORAL at 16:48

## 2018-09-28 RX ADMIN — PANTOPRAZOLE SODIUM 40 MG: 40 TABLET, DELAYED RELEASE ORAL at 07:00

## 2018-09-28 RX ADMIN — POLYETHYLENE GLYCOL 3350 17 G: 17 POWDER, FOR SOLUTION ORAL at 20:57

## 2018-09-28 RX ADMIN — ANTACID TABLETS 500 MG: 500 TABLET, CHEWABLE ORAL at 09:44

## 2018-09-28 RX ADMIN — PANCRELIPASE 1 CAPSULE: 24000; 76000; 120000 CAPSULE, DELAYED RELEASE PELLETS ORAL at 08:19

## 2018-09-28 RX ADMIN — PANTOPRAZOLE SODIUM 40 MG: 40 TABLET, DELAYED RELEASE ORAL at 16:48

## 2018-09-28 RX ADMIN — Medication 10 ML: at 20:57

## 2018-09-28 RX ADMIN — PANCRELIPASE 1 CAPSULE: 24000; 76000; 120000 CAPSULE, DELAYED RELEASE PELLETS ORAL at 11:44

## 2018-09-28 RX ADMIN — POLYETHYLENE GLYCOL 3350 17 G: 17 POWDER, FOR SOLUTION ORAL at 09:44

## 2018-09-28 RX ADMIN — TRAZODONE HYDROCHLORIDE 50 MG: 50 TABLET ORAL at 20:57

## 2018-09-28 RX ADMIN — GABAPENTIN 300 MG: 300 CAPSULE ORAL at 20:57

## 2018-09-28 RX ADMIN — Medication 10 ML: at 08:23

## 2018-09-28 RX ADMIN — GABAPENTIN 300 MG: 300 CAPSULE ORAL at 14:36

## 2018-09-28 RX ADMIN — BUDESONIDE 500 MCG: 0.5 SUSPENSION RESPIRATORY (INHALATION) at 11:22

## 2018-09-28 RX ADMIN — OXYCODONE AND ACETAMINOPHEN 1 TABLET: 5; 325 TABLET ORAL at 11:44

## 2018-09-28 RX ADMIN — APIXABAN 5 MG: 5 TABLET, FILM COATED ORAL at 08:18

## 2018-09-28 ASSESSMENT — PAIN SCALES - GENERAL
PAINLEVEL_OUTOF10: 5
PAINLEVEL_OUTOF10: 7
PAINLEVEL_OUTOF10: 8
PAINLEVEL_OUTOF10: 6

## 2018-09-28 NOTE — PROGRESS NOTES
Hospitalist Progress Note      PCP: Malachi Romero MD    Date of Admission: 9/18/2018    Chief Complaint: constipation    Hospital Course: admitted with shortness of breath. COPD exacerbation and pneumonia both treated. Patient is constipated. No BM x several days     Subjective: constipation. No chest pain, mild shortness of breath as of baseline. Medications:  Reviewed    Infusion Medications   Scheduled Medications    polyethylene glycol  17 g Oral BID    traZODone  50 mg Oral Nightly    amLODIPine  2.5 mg Oral Daily    pantoprazole  40 mg Oral BID AC    diltiazem  120 mg Oral Daily    gabapentin  300 mg Oral TID    sodium chloride flush  10 mL Intravenous 2 times per day    apixaban  5 mg Oral BID    lipase-protease-amylase  1 capsule Oral TID WC    budesonide  0.5 mg Nebulization BID     PRN Meds: ipratropium-albuterol, calcium carbonate, oxyCODONE-acetaminophen, simethicone, ALPRAZolam, enalaprilat, guaiFENesin-dextromethorphan, sodium chloride flush, magnesium hydroxide, ondansetron, acetaminophen      Intake/Output Summary (Last 24 hours) at 09/28/18 1559  Last data filed at 09/28/18 1441   Gross per 24 hour   Intake              480 ml   Output              350 ml   Net              130 ml       Physical Exam Performed:    BP (!) 146/61   Pulse 98   Temp 98.7 °F (37.1 °C) (Oral)   Resp 18   Ht 5' 8\" (1.727 m)   Wt 105 lb 13.1 oz (48 kg)   SpO2 98%   BMI 16.09 kg/m²     General appearance: No apparent distress, appears stated age and cooperative. HEENT: Pupils equal, round, and reactive to light. Conjunctivae/corneas clear. Neck: Supple, with full range of motion. No jugular venous distention. Trachea midline. Respiratory:  Normal respiratory effort. Bilateral scattered rhonchi  Cardiovascular: Regular rate and rhythm with normal S1/S2 without murmurs, rubs or gallops. Abdomen: Soft, mild generalized abdominal tenderness without rebound tenderness.  Abdomen is non-distended

## 2018-09-28 NOTE — PROGRESS NOTES
Nutrition Assessment    Type and Reason for Visit: Reassess    Nutrition Recommendations:   Continue general diet. Continue to monitor PO intakes, weights, I&O's, skin integrity, and nutrition-related labs. Malnutrition Assessment:  · Malnutrition Status: Meets the criteria for severe malnutrition  · Context: Acute illness or injury  · Findings of the 6 clinical characteristics of malnutrition (Minimum of 2 out of 6 clinical characteristics is required to make the diagnosis of moderate or severe Protein Calorie Malnutrition based on AND/ASPEN Guidelines):  1. Energy Intake-Less than or equal to 50%, greater than 7 days    2. Weight Loss-7.5% loss or greater, in 1 week  3. Fat Loss-Moderate subcutaneous fat loss, Orbital, Triceps  4. Muscle Loss-Moderate muscle mass loss, Temples (temporalis muscle)  5. Fluid Accumulation-No significant fluid accumulation,    6.  Strength-Not measured    Nutrition Diagnosis:   · Problem: Inadequate oral intake  · Etiology: related to Insufficient energy/nutrient consumption     Signs and symptoms:  as evidenced by Weight loss 1-2% in 1 week, Moderate loss of subcutaneous fat, Moderate muscle loss    Nutrition Assessment:  · Subjective Assessment: Follow-up. Pt reports appetite is getting better or at least back to his baseline. Pt with intakes 26-75%. Pt states he usually doesn't eat breakfast, has a big lunch and a small dinner. Diet was liberalized to encourage PO intakes. Wt has been trending down since admission but pt strongly declines supplement.   · Nutrition-Focused Physical Findings:  9/26  · Wound Type: None  · Current Nutrition Therapies:  · Oral Diet Orders: General   · Oral Diet intake: 26-50%, 51-75%  · Oral Nutrition Supplement (ONS) Orders: None  · Anthropometric Measures:  · Ht: 5' 8\" (172.7 cm)   · Current Body Wt: 105 lb (47.6 kg)  · Admission Body Wt: 118 lb (53.5 kg)  · Usual Body Wt: 115 lb (52.2 kg)  · % Weight Change: -8.4% Possible 2/2 fluid

## 2018-09-29 VITALS
OXYGEN SATURATION: 99 % | RESPIRATION RATE: 16 BRPM | WEIGHT: 105.6 LBS | DIASTOLIC BLOOD PRESSURE: 69 MMHG | TEMPERATURE: 98.7 F | SYSTOLIC BLOOD PRESSURE: 145 MMHG | HEIGHT: 68 IN | HEART RATE: 91 BPM | BODY MASS INDEX: 16 KG/M2

## 2018-09-29 PROBLEM — K59.03 CONSTIPATION DUE TO PAIN MEDICATION: Status: ACTIVE | Noted: 2018-09-29

## 2018-09-29 LAB
ANION GAP SERPL CALCULATED.3IONS-SCNC: 6 MMOL/L (ref 3–16)
BUN BLDV-MCNC: 7 MG/DL (ref 7–20)
CALCIUM SERPL-MCNC: 8.9 MG/DL (ref 8.3–10.6)
CHLORIDE BLD-SCNC: 96 MMOL/L (ref 99–110)
CO2: 37 MMOL/L (ref 21–32)
CREAT SERPL-MCNC: 0.7 MG/DL (ref 0.8–1.3)
GFR AFRICAN AMERICAN: >60
GFR NON-AFRICAN AMERICAN: >60
GLUCOSE BLD-MCNC: 114 MG/DL (ref 70–99)
HCT VFR BLD CALC: 27.3 % (ref 40.5–52.5)
HEMOGLOBIN: 8.7 G/DL (ref 13.5–17.5)
MCH RBC QN AUTO: 30.2 PG (ref 26–34)
MCHC RBC AUTO-ENTMCNC: 31.9 G/DL (ref 31–36)
MCV RBC AUTO: 94.7 FL (ref 80–100)
PDW BLD-RTO: 14.9 % (ref 12.4–15.4)
PLATELET # BLD: 360 K/UL (ref 135–450)
PMV BLD AUTO: 6.9 FL (ref 5–10.5)
POTASSIUM REFLEX MAGNESIUM: 3.9 MMOL/L (ref 3.5–5.1)
RBC # BLD: 2.88 M/UL (ref 4.2–5.9)
SODIUM BLD-SCNC: 139 MMOL/L (ref 136–145)
WBC # BLD: 6.5 K/UL (ref 4–11)

## 2018-09-29 PROCEDURE — 6360000002 HC RX W HCPCS: Performed by: INTERNAL MEDICINE

## 2018-09-29 PROCEDURE — 80048 BASIC METABOLIC PNL TOTAL CA: CPT

## 2018-09-29 PROCEDURE — 94640 AIRWAY INHALATION TREATMENT: CPT

## 2018-09-29 PROCEDURE — 6370000000 HC RX 637 (ALT 250 FOR IP): Performed by: PHYSICIAN ASSISTANT

## 2018-09-29 PROCEDURE — 6370000000 HC RX 637 (ALT 250 FOR IP): Performed by: INTERNAL MEDICINE

## 2018-09-29 PROCEDURE — 6370000000 HC RX 637 (ALT 250 FOR IP): Performed by: HOSPITALIST

## 2018-09-29 PROCEDURE — 2580000003 HC RX 258: Performed by: INTERNAL MEDICINE

## 2018-09-29 PROCEDURE — 2700000000 HC OXYGEN THERAPY PER DAY

## 2018-09-29 PROCEDURE — 94760 N-INVAS EAR/PLS OXIMETRY 1: CPT

## 2018-09-29 PROCEDURE — 36415 COLL VENOUS BLD VENIPUNCTURE: CPT

## 2018-09-29 PROCEDURE — 85027 COMPLETE CBC AUTOMATED: CPT

## 2018-09-29 PROCEDURE — 94667 MNPJ CHEST WALL 1ST: CPT

## 2018-09-29 RX ORDER — SODIUM PHOSPHATE, DIBASIC AND SODIUM PHOSPHATE, MONOBASIC 7; 19 G/133ML; G/133ML
1 ENEMA RECTAL DAILY PRN
Status: DISCONTINUED | OUTPATIENT
Start: 2018-09-29 | End: 2018-09-29 | Stop reason: HOSPADM

## 2018-09-29 RX ORDER — GABAPENTIN 300 MG/1
300 CAPSULE ORAL 3 TIMES DAILY
Qty: 6 CAPSULE | Refills: 0 | Status: ON HOLD | OUTPATIENT
Start: 2018-09-29 | End: 2020-07-15

## 2018-09-29 RX ORDER — FUROSEMIDE 40 MG/1
20 TABLET ORAL DAILY
Qty: 60 TABLET | Refills: 3 | Status: ON HOLD | DISCHARGE
Start: 2018-09-29 | End: 2020-07-15

## 2018-09-29 RX ADMIN — PANTOPRAZOLE SODIUM 40 MG: 40 TABLET, DELAYED RELEASE ORAL at 09:41

## 2018-09-29 RX ADMIN — APIXABAN 5 MG: 5 TABLET, FILM COATED ORAL at 09:41

## 2018-09-29 RX ADMIN — DILTIAZEM HYDROCHLORIDE 120 MG: 120 CAPSULE, COATED, EXTENDED RELEASE ORAL at 09:41

## 2018-09-29 RX ADMIN — OXYCODONE AND ACETAMINOPHEN 1 TABLET: 5; 325 TABLET ORAL at 04:19

## 2018-09-29 RX ADMIN — POLYETHYLENE GLYCOL 3350 17 G: 17 POWDER, FOR SOLUTION ORAL at 09:42

## 2018-09-29 RX ADMIN — MAGNESIUM HYDROXIDE 30 ML: 400 SUSPENSION ORAL at 09:42

## 2018-09-29 RX ADMIN — GABAPENTIN 300 MG: 300 CAPSULE ORAL at 14:53

## 2018-09-29 RX ADMIN — OXYCODONE AND ACETAMINOPHEN 1 TABLET: 5; 325 TABLET ORAL at 12:05

## 2018-09-29 RX ADMIN — NALOXEGOL OXALATE 25 MG: 25 TABLET, FILM COATED ORAL at 14:56

## 2018-09-29 RX ADMIN — GABAPENTIN 300 MG: 300 CAPSULE ORAL at 09:41

## 2018-09-29 RX ADMIN — PANTOPRAZOLE SODIUM 40 MG: 40 TABLET, DELAYED RELEASE ORAL at 14:53

## 2018-09-29 RX ADMIN — AMLODIPINE BESYLATE 2.5 MG: 5 TABLET ORAL at 09:41

## 2018-09-29 RX ADMIN — Medication 10 ML: at 09:43

## 2018-09-29 RX ADMIN — BUDESONIDE 500 MCG: 0.5 SUSPENSION RESPIRATORY (INHALATION) at 08:11

## 2018-09-29 RX ADMIN — ANTACID TABLETS 500 MG: 500 TABLET, CHEWABLE ORAL at 10:50

## 2018-09-29 RX ADMIN — PANCRELIPASE 1 CAPSULE: 24000; 76000; 120000 CAPSULE, DELAYED RELEASE PELLETS ORAL at 09:41

## 2018-09-29 RX ADMIN — PANCRELIPASE 1 CAPSULE: 24000; 76000; 120000 CAPSULE, DELAYED RELEASE PELLETS ORAL at 12:05

## 2018-09-29 ASSESSMENT — PAIN SCALES - GENERAL
PAINLEVEL_OUTOF10: 7
PAINLEVEL_OUTOF10: 3
PAINLEVEL_OUTOF10: 7

## 2018-09-29 NOTE — PLAN OF CARE
Problem: Nutrition  Goal: Optimal nutrition therapy  Outcome: Ongoing  Nutrition Problem: Inadequate oral intake  Intervention: Food and/or Nutrient Delivery: Continue current diet  Nutritional Goals: consume >50% of meals during admission.
Problem: Pain:  Goal: Control of acute pain  Control of acute pain   Outcome: Ongoing  Pt able to express presence/absence of pain and rate pain appropriately using numerical scale. Pain/discomfort being managed with PRN analgesics per MD orders (see MAR). Pain assessed every shift and after interventions. Problem: Infection:  Goal: Will remain free from infection  Will remain free from infection   Outcome: Ongoing      Problem: Daily Care:  Goal: Daily care needs are met  Daily care needs are met   Outcome: Ongoing  Ongoing. Problem: Skin Integrity:  Goal: Skin integrity will stabilize  Skin integrity will stabilize   Outcome: Ongoing  Skin assessment performed each shift per protocol. Patient turned and repositioned every two hours and prn with pillow support. Patient checked for incontence every two hours. Problem: Discharge Planning:  Goal: Patients continuum of care needs are met  Patients continuum of care needs are met   Outcome: Ongoing  Ongoing. Problem: Falls - Risk of:  Goal: Will remain free from falls  Will remain free from falls   Outcome: Ongoing  Pt free from falls this shift. Fall precautions in place at all times. Call light always within reach. Pt able and agreeable to contact for safety appropriately.
Problem: Pain:  Goal: Pain level will decrease  Pain level will decrease   Outcome: Met This Shift  D: pt has pain in left side, near ribs, 8 out of 10, pt didn't have morphine at time A: this RN spoke to Dr. Angelica Chung R: Dr. Angelica Chung agreed to give pt morphine for first day as rib pain may be from pneumonia, plan to reduce pain medication tomorrow, pt agreed to plan of care
Problem: Pain:  Goal: Pain level will decrease  Pain level will decrease   Outcome: Ongoing    Goal: Control of acute pain  Control of acute pain   Outcome: Ongoing    Goal: Control of chronic pain  Control of chronic pain   Outcome: Ongoing      Problem: Infection:  Goal: Will remain free from infection  Will remain free from infection   Outcome: Ongoing      Problem: Safety:  Goal: Free from accidental physical injury  Free from accidental physical injury   Outcome: Ongoing      Problem: Daily Care:  Goal: Daily care needs are met  Daily care needs are met   Outcome: Ongoing      Problem: Skin Integrity:  Goal: Skin integrity will stabilize  Skin integrity will stabilize   Outcome: Ongoing      Problem: Discharge Planning:  Goal: Patients continuum of care needs are met  Patients continuum of care needs are met   Outcome: Ongoing      Problem: Falls - Risk of:  Goal: Will remain free from falls  Will remain free from falls   Outcome: Ongoing  Pt remains free from falls this shift.   Goal: Absence of physical injury  Absence of physical injury   Outcome: Ongoing      Problem: Nutrition  Goal: Optimal nutrition therapy  Outcome: Ongoing      Problem: Airway Clearance - Ineffective:  Goal: Clear lung sounds  Clear lung sounds   Outcome: Ongoing    Goal: Ability to maintain a clear airway will improve  Ability to maintain a clear airway will improve   Outcome: Ongoing      Problem: Gas Exchange - Impaired:  Goal: Levels of oxygenation will improve  Levels of oxygenation will improve   Outcome: Ongoing
Problem: Pain:  Goal: Pain level will decrease  Pain level will decrease   Outcome: Ongoing    Goal: Control of acute pain  Control of acute pain   Outcome: Ongoing    Goal: Control of chronic pain  Control of chronic pain   Outcome: Ongoing    Goal: Patient's pain/discomfort is manageable  Patient's pain/discomfort is manageable   Outcome: Ongoing      Problem: Infection:  Goal: Will remain free from infection  Will remain free from infection   Outcome: Ongoing      Problem: Safety:  Goal: Free from accidental physical injury  Free from accidental physical injury   Outcome: Ongoing    Goal: Free from intentional harm  Free from intentional harm   Outcome: Ongoing      Problem: Daily Care:  Goal: Daily care needs are met  Daily care needs are met   Outcome: Ongoing      Problem: Skin Integrity:  Goal: Skin integrity will stabilize  Skin integrity will stabilize   Outcome: Ongoing      Problem: Discharge Planning:  Goal: Patients continuum of care needs are met  Patients continuum of care needs are met   Outcome: Ongoing      Problem: Falls - Risk of:  Goal: Will remain free from falls  Will remain free from falls   Outcome: Ongoing    Goal: Absence of physical injury  Absence of physical injury   Outcome: Ongoing
Problem: Pain:  Goal: Pain level will decrease  Pain level will decrease   Outcome: Ongoing    Goal: Control of acute pain  Control of acute pain   Outcome: Ongoing    Goal: Control of chronic pain  Control of chronic pain   Outcome: Ongoing    Goal: Patient's pain/discomfort is manageable  Patient's pain/discomfort is manageable   Outcome: Ongoing      Problem: Infection:  Goal: Will remain free from infection  Will remain free from infection   Outcome: Ongoing      Problem: Safety:  Goal: Free from accidental physical injury  Free from accidental physical injury   Outcome: Ongoing    Goal: Free from intentional harm  Free from intentional harm   Outcome: Ongoing      Problem: Daily Care:  Goal: Daily care needs are met  Daily care needs are met   Outcome: Ongoing      Problem: Skin Integrity:  Goal: Skin integrity will stabilize  Skin integrity will stabilize   Outcome: Ongoing      Problem: Discharge Planning:  Goal: Patients continuum of care needs are met  Patients continuum of care needs are met   Outcome: Ongoing      Problem: Falls - Risk of:  Goal: Will remain free from falls  Will remain free from falls   Outcome: Ongoing    Goal: Absence of physical injury  Absence of physical injury   Outcome: Ongoing      Problem: Skin Integrity:  Goal: Absence of new skin breakdown  Absence of new skin breakdown   Outcome: Ongoing      Problem: Nutrition  Goal: Optimal nutrition therapy  Outcome: Ongoing      Problem: Airway Clearance - Ineffective:  Goal: Clear lung sounds  Clear lung sounds   Outcome: Ongoing    Goal: Ability to maintain a clear airway will improve  Ability to maintain a clear airway will improve   Outcome: Ongoing      Problem: Gas Exchange - Impaired:  Goal: Levels of oxygenation will improve  Levels of oxygenation will improve   Outcome: Ongoing
Problem: Pain:  Goal: Pain level will decrease  Pain level will decrease   Outcome: Ongoing    Goal: Control of acute pain  Control of acute pain   Outcome: Ongoing    Goal: Control of chronic pain  Control of chronic pain   Outcome: Ongoing    Goal: Patient's pain/discomfort is manageable  Patient's pain/discomfort is manageable   Outcome: Ongoing      Problem: Infection:  Goal: Will remain free from infection  Will remain free from infection   Outcome: Ongoing      Problem: Safety:  Goal: Free from accidental physical injury  Free from accidental physical injury   Outcome: Ongoing    Goal: Free from intentional harm  Free from intentional harm   Outcome: Ongoing      Problem: Daily Care:  Goal: Daily care needs are met  Daily care needs are met   Outcome: Ongoing      Problem: Skin Integrity:  Goal: Skin integrity will stabilize  Skin integrity will stabilize   Outcome: Ongoing      Problem: Discharge Planning:  Goal: Patients continuum of care needs are met  Patients continuum of care needs are met   Outcome: Ongoing      Problem: Falls - Risk of:  Goal: Will remain free from falls  Will remain free from falls   Outcome: Ongoing    Goal: Absence of physical injury  Absence of physical injury   Outcome: Ongoing      Problem: Skin Integrity:  Goal: Absence of new skin breakdown  Absence of new skin breakdown   Outcome: Ongoing      Problem: Nutrition  Goal: Optimal nutrition therapy  Outcome: Ongoing      Problem: Airway Clearance - Ineffective:  Goal: Clear lung sounds  Clear lung sounds   Outcome: Ongoing    Goal: Ability to maintain a clear airway will improve  Ability to maintain a clear airway will improve   Outcome: Ongoing      Problem: Gas Exchange - Impaired:  Goal: Levels of oxygenation will improve  Levels of oxygenation will improve   Outcome: Ongoing
Problem: Pain:  Goal: Pain level will decrease  Pain level will decrease   Outcome: Ongoing    Goal: Control of acute pain  Control of acute pain   Outcome: Ongoing    Goal: Control of chronic pain  Control of chronic pain   Outcome: Ongoing  Pt able to express presence/absence of pain and rate pain appropriately using numerical scale. Pain/discomfort being managed with PRN analgesics per MD orders (see MAR). Pain assessed every shift and after interventions. Problem: Infection:  Goal: Will remain free from infection  Will remain free from infection   Outcome: Ongoing      Problem: Safety:  Goal: Free from accidental physical injury  Free from accidental physical injury   Outcome: Ongoing    Goal: Free from intentional harm  Free from intentional harm   Outcome: Ongoing      Problem: Daily Care:  Goal: Daily care needs are met  Daily care needs are met   Outcome: Ongoing      Problem: Skin Integrity:  Goal: Skin integrity will stabilize  Skin integrity will stabilize   Outcome: Ongoing      Problem: Discharge Planning:  Goal: Patients continuum of care needs are met  Patients continuum of care needs are met   Outcome: Ongoing      Problem: Falls - Risk of:  Goal: Will remain free from falls  Will remain free from falls   Outcome: Ongoing  Pt remains free from falls this shift. Problem: Skin Integrity:  Goal: Absence of new skin breakdown  Absence of new skin breakdown   Outcome: Ongoing  Encouraged pt to turn and reposition but pt refuses to turn off of his back.
Problem: Pain:  Goal: Pain level will decrease  Pain level will decrease   Outcome: Ongoing    Goal: Control of chronic pain  Control of chronic pain   Outcome: Ongoing  Pt able to express presence/absence of pain and rate pain appropriately using numerical scale. Pain/discomfort being managed with PRN analgesics per MD orders (see MAR). Pain assessed every shift and after interventions. Goal: Patient's pain/discomfort is manageable  Patient's pain/discomfort is manageable   Outcome: Ongoing      Problem: Infection:  Goal: Will remain free from infection  Will remain free from infection   Outcome: Ongoing      Problem: Safety:  Goal: Free from accidental physical injury  Free from accidental physical injury   Outcome: Ongoing    Goal: Free from intentional harm  Free from intentional harm   Outcome: Ongoing      Problem: Daily Care:  Goal: Daily care needs are met  Daily care needs are met   Outcome: Ongoing      Problem: Skin Integrity:  Goal: Skin integrity will stabilize  Skin integrity will stabilize   Outcome: Ongoing      Problem: Discharge Planning:  Goal: Patients continuum of care needs are met  Patients continuum of care needs are met   Outcome: Ongoing  Continuing to work with patient and health care team on discharge plan. Discharge instructions and medication management will be reviewed prior to discharge.       Problem: Skin Integrity:  Goal: Absence of new skin breakdown  Absence of new skin breakdown   Outcome: Ongoing      Problem: Nutrition  Goal: Optimal nutrition therapy  Outcome: Ongoing      Problem: Airway Clearance - Ineffective:  Goal: Clear lung sounds  Clear lung sounds   Outcome: Ongoing    Goal: Ability to maintain a clear airway will improve  Ability to maintain a clear airway will improve   Outcome: Ongoing      Problem: Gas Exchange - Impaired:  Goal: Levels of oxygenation will improve  Levels of oxygenation will improve   Outcome: Ongoing
Problem: Pain:  Goal: Pain level will decrease  Pain level will decrease   Outcome: Ongoing  Pain/discomfort being managed with PRN analgesics per MD orders. Pt able to express presence and absence of pain and rate pain appropriately using numerical scale. Problem: Falls - Risk of:  Goal: Will remain free from falls  Will remain free from falls   Outcome: Ongoing  Patient free from falls this shift. Fall precautions in place at all times. Bed in lowest position with two side rails up and wheels locked. Call light within reach. Patient able and agreeable to contact for safety appropriately.
Problem: Skin Integrity:  Goal: Absence of new skin breakdown  Absence of new skin breakdown  Outcome: Ongoing  Encouraged pt to turn and reposition q 2 hours and prn for comfort. Pt has been refusing reposition. Educated pt on importance of turning.
breath. Encourage HHN as ordered. Encourage IS. Problem: Gas Exchange - Impaired:  Goal: Levels of oxygenation will improve  Levels of oxygenation will improve  Outcome: Ongoing  Pt will maintain absence of respiratory complications. Oxygen saturations >90% at all times with supplemental O2 as needed. Will assess respiratory status every shift and PRN. Encourage to cough and deep breath. Encourage HHN as ordered. Encourage IS.

## 2019-06-12 ENCOUNTER — APPOINTMENT (OUTPATIENT)
Dept: GENERAL RADIOLOGY | Age: 74
End: 2019-06-12
Payer: COMMERCIAL

## 2019-06-12 ENCOUNTER — HOSPITAL ENCOUNTER (EMERGENCY)
Age: 74
Discharge: HOME | End: 2019-06-12
Attending: EMERGENCY MEDICINE
Payer: COMMERCIAL

## 2019-06-12 VITALS
HEART RATE: 92 BPM | BODY MASS INDEX: 18.05 KG/M2 | RESPIRATION RATE: 20 BRPM | HEIGHT: 67 IN | TEMPERATURE: 98.3 F | OXYGEN SATURATION: 96 % | SYSTOLIC BLOOD PRESSURE: 137 MMHG | DIASTOLIC BLOOD PRESSURE: 66 MMHG | WEIGHT: 115 LBS
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
## 2019-06-12 DIAGNOSIS — S46.811A TRAPEZIUS STRAIN, RIGHT, INITIAL ENCOUNTER: ICD-10-CM

## 2019-06-12 PROCEDURE — 99284 EMERGENCY DEPT VISIT MOD MDM: CPT

## 2019-06-12 PROCEDURE — 73030 X-RAY EXAM OF SHOULDER: CPT

## 2019-06-12 ASSESSMENT — ENCOUNTER SYMPTOMS
DIARRHEA: 0
NAUSEA: 0
CHEST TIGHTNESS: 0
SORE THROAT: 0
COLOR CHANGE: 0
ABDOMINAL PAIN: 0
BACK PAIN: 0
VOMITING: 0
EYE PAIN: 0
SHORTNESS OF BREATH: 0

## 2019-06-12 ASSESSMENT — PAIN DESCRIPTION - LOCATION: LOCATION: SHOULDER

## 2019-06-12 ASSESSMENT — PAIN SCALES - GENERAL: PAINLEVEL_OUTOF10: 7

## 2019-06-12 ASSESSMENT — PAIN DESCRIPTION - ORIENTATION: ORIENTATION: RIGHT

## 2019-06-12 NOTE — FLOWSHEET NOTE
06/12/19 1150   Encounter Summary   Services provided to: Patient   Referral/Consult From: Rounding   Continue Visiting   (Seen 6/12/19, [facility]. )   Complexity of Encounter Moderate   Length of Encounter 15 minutes   Routine   Type Initial   Assessment Approachable   Intervention Nurtured hope   Outcome Expressed gratitude
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
## 2019-06-12 NOTE — ED NOTES
Patient prepared for and ready to be discharged. Patient discharged at this time in no acute distress after verbalizing understanding of discharge instructions. Patient left after receiving After Visit Summary instructions.         Kedar Smallwood RN  06/12/19 7513

## 2020-07-15 ENCOUNTER — APPOINTMENT (OUTPATIENT)
Dept: GENERAL RADIOLOGY | Age: 75
DRG: 871 | End: 2020-07-15
Payer: MEDICARE

## 2020-07-15 ENCOUNTER — APPOINTMENT (OUTPATIENT)
Dept: CT IMAGING | Age: 75
DRG: 871 | End: 2020-07-15
Payer: MEDICARE

## 2020-07-15 ENCOUNTER — HOSPITAL ENCOUNTER (INPATIENT)
Age: 75
LOS: 6 days | Discharge: NURSING, REHAB OR LONG-TERM CARE | DRG: 871 | End: 2020-07-21
Attending: EMERGENCY MEDICINE | Admitting: INTERNAL MEDICINE
Payer: MEDICARE

## 2020-07-15 PROBLEM — A41.9 SEPSIS (HCC): Status: ACTIVE | Noted: 2020-07-15

## 2020-07-15 PROBLEM — K92.2 GI BLEED: Status: ACTIVE | Noted: 2020-07-15

## 2020-07-15 PROBLEM — K56.609 SMALL BOWEL OBSTRUCTION (HCC): Status: ACTIVE | Noted: 2020-07-15

## 2020-07-15 LAB
A/G RATIO: 0.9 (ref 1.1–2.2)
ABO/RH: NORMAL
ALBUMIN SERPL-MCNC: 3.4 G/DL (ref 3.4–5)
ALP BLD-CCNC: 116 U/L (ref 40–129)
ALT SERPL-CCNC: 12 U/L (ref 10–40)
ANION GAP SERPL CALCULATED.3IONS-SCNC: 15 MMOL/L (ref 3–16)
ANTIBODY SCREEN: NORMAL
AST SERPL-CCNC: 22 U/L (ref 15–37)
BASOPHILS ABSOLUTE: 0.1 K/UL (ref 0–0.2)
BASOPHILS RELATIVE PERCENT: 0.5 %
BILIRUB SERPL-MCNC: 0.3 MG/DL (ref 0–1)
BILIRUBIN URINE: NEGATIVE
BLOOD, URINE: NEGATIVE
BUN BLDV-MCNC: 12 MG/DL (ref 7–20)
CALCIUM SERPL-MCNC: 9.8 MG/DL (ref 8.3–10.6)
CHLORIDE BLD-SCNC: 87 MMOL/L (ref 99–110)
CLARITY: CLEAR
CO2: 33 MMOL/L (ref 21–32)
COLOR: YELLOW
CREAT SERPL-MCNC: 0.7 MG/DL (ref 0.8–1.3)
EKG ATRIAL RATE: 135 BPM
EKG DIAGNOSIS: NORMAL
EKG P AXIS: 77 DEGREES
EKG P-R INTERVAL: 130 MS
EKG Q-T INTERVAL: 284 MS
EKG QRS DURATION: 56 MS
EKG QTC CALCULATION (BAZETT): 426 MS
EKG R AXIS: 74 DEGREES
EKG T AXIS: 85 DEGREES
EKG VENTRICULAR RATE: 135 BPM
EOSINOPHILS ABSOLUTE: 0 K/UL (ref 0–0.6)
EOSINOPHILS RELATIVE PERCENT: 0.2 %
EPITHELIAL CELLS, UA: 1 /HPF (ref 0–5)
GFR AFRICAN AMERICAN: >60
GFR NON-AFRICAN AMERICAN: >60
GLOBULIN: 3.8 G/DL
GLUCOSE BLD-MCNC: 175 MG/DL (ref 70–99)
GLUCOSE URINE: NEGATIVE MG/DL
HCT VFR BLD CALC: 27.5 % (ref 40.5–52.5)
HCT VFR BLD CALC: 37.7 % (ref 40.5–52.5)
HEMOGLOBIN: 12.3 G/DL (ref 13.5–17.5)
HEMOGLOBIN: 8.8 G/DL (ref 13.5–17.5)
HYALINE CASTS: 3 /LPF (ref 0–8)
INR BLD: 1.39 (ref 0.86–1.14)
KETONES, URINE: NEGATIVE MG/DL
L. PNEUMOPHILA SEROGP 1 UR AG: NORMAL
LACTIC ACID: 0.8 MMOL/L (ref 0.4–2)
LACTIC ACID: 2 MMOL/L (ref 0.4–2)
LACTIC ACID: 2.1 MMOL/L (ref 0.4–2)
LEUKOCYTE ESTERASE, URINE: NEGATIVE
LIPASE: 31 U/L (ref 13–60)
LYMPHOCYTES ABSOLUTE: 0.8 K/UL (ref 1–5.1)
LYMPHOCYTES RELATIVE PERCENT: 8.6 %
MCH RBC QN AUTO: 31.4 PG (ref 26–34)
MCHC RBC AUTO-ENTMCNC: 32.5 G/DL (ref 31–36)
MCV RBC AUTO: 96.6 FL (ref 80–100)
MICROSCOPIC EXAMINATION: YES
MONOCYTES ABSOLUTE: 0.7 K/UL (ref 0–1.3)
MONOCYTES RELATIVE PERCENT: 7.1 %
NEUTROPHILS ABSOLUTE: 8.2 K/UL (ref 1.7–7.7)
NEUTROPHILS RELATIVE PERCENT: 83.6 %
NITRITE, URINE: NEGATIVE
PDW BLD-RTO: 13.9 % (ref 12.4–15.4)
PH UA: 8.5 (ref 5–8)
PLATELET # BLD: 475 K/UL (ref 135–450)
PMV BLD AUTO: 7.4 FL (ref 5–10.5)
POTASSIUM REFLEX MAGNESIUM: 5.2 MMOL/L (ref 3.5–5.1)
PROCALCITONIN: 0.38 NG/ML (ref 0–0.15)
PROTEIN UA: 30 MG/DL
PROTHROMBIN TIME: 16.2 SEC (ref 10–13.2)
RBC # BLD: 3.91 M/UL (ref 4.2–5.9)
RBC UA: 4 /HPF (ref 0–4)
SARS-COV-2, NAAT: NOT DETECTED
SARS-COV-2, PCR: DETECTED
SODIUM BLD-SCNC: 135 MMOL/L (ref 136–145)
SPECIFIC GRAVITY UA: >1.03 (ref 1–1.03)
STREP PNEUMONIAE ANTIGEN, URINE: NORMAL
TOTAL PROTEIN: 7.2 G/DL (ref 6.4–8.2)
TROPONIN: <0.01 NG/ML
URINE REFLEX TO CULTURE: ABNORMAL
URINE TYPE: ABNORMAL
UROBILINOGEN, URINE: 0.2 E.U./DL
WBC # BLD: 9.9 K/UL (ref 4–11)
WBC UA: 1 /HPF (ref 0–5)

## 2020-07-15 PROCEDURE — 93010 ELECTROCARDIOGRAM REPORT: CPT | Performed by: INTERNAL MEDICINE

## 2020-07-15 PROCEDURE — 94760 N-INVAS EAR/PLS OXIMETRY 1: CPT

## 2020-07-15 PROCEDURE — 2580000003 HC RX 258: Performed by: INTERNAL MEDICINE

## 2020-07-15 PROCEDURE — 87081 CULTURE SCREEN ONLY: CPT

## 2020-07-15 PROCEDURE — 02HV33Z INSERTION OF INFUSION DEVICE INTO SUPERIOR VENA CAVA, PERCUTANEOUS APPROACH: ICD-10-PCS | Performed by: INTERNAL MEDICINE

## 2020-07-15 PROCEDURE — C1751 CATH, INF, PER/CENT/MIDLINE: HCPCS

## 2020-07-15 PROCEDURE — 84145 PROCALCITONIN (PCT): CPT

## 2020-07-15 PROCEDURE — 81001 URINALYSIS AUTO W/SCOPE: CPT

## 2020-07-15 PROCEDURE — 36569 INSJ PICC 5 YR+ W/O IMAGING: CPT

## 2020-07-15 PROCEDURE — 85610 PROTHROMBIN TIME: CPT

## 2020-07-15 PROCEDURE — 86923 COMPATIBILITY TEST ELECTRIC: CPT

## 2020-07-15 PROCEDURE — 94640 AIRWAY INHALATION TREATMENT: CPT

## 2020-07-15 PROCEDURE — C9113 INJ PANTOPRAZOLE SODIUM, VIA: HCPCS | Performed by: INTERNAL MEDICINE

## 2020-07-15 PROCEDURE — 74177 CT ABD & PELVIS W/CONTRAST: CPT

## 2020-07-15 PROCEDURE — 80053 COMPREHEN METABOLIC PANEL: CPT

## 2020-07-15 PROCEDURE — 93005 ELECTROCARDIOGRAM TRACING: CPT | Performed by: EMERGENCY MEDICINE

## 2020-07-15 PROCEDURE — 85025 COMPLETE CBC W/AUTO DIFF WBC: CPT

## 2020-07-15 PROCEDURE — 83605 ASSAY OF LACTIC ACID: CPT

## 2020-07-15 PROCEDURE — 6360000004 HC RX CONTRAST MEDICATION: Performed by: EMERGENCY MEDICINE

## 2020-07-15 PROCEDURE — 6370000000 HC RX 637 (ALT 250 FOR IP): Performed by: INTERNAL MEDICINE

## 2020-07-15 PROCEDURE — 87449 NOS EACH ORGANISM AG IA: CPT

## 2020-07-15 PROCEDURE — 6360000002 HC RX W HCPCS: Performed by: EMERGENCY MEDICINE

## 2020-07-15 PROCEDURE — 6360000002 HC RX W HCPCS: Performed by: INTERNAL MEDICINE

## 2020-07-15 PROCEDURE — 83690 ASSAY OF LIPASE: CPT

## 2020-07-15 PROCEDURE — 86900 BLOOD TYPING SEROLOGIC ABO: CPT

## 2020-07-15 PROCEDURE — U0003 INFECTIOUS AGENT DETECTION BY NUCLEIC ACID (DNA OR RNA); SEVERE ACUTE RESPIRATORY SYNDROME CORONAVIRUS 2 (SARS-COV-2) (CORONAVIRUS DISEASE [COVID-19]), AMPLIFIED PROBE TECHNIQUE, MAKING USE OF HIGH THROUGHPUT TECHNOLOGIES AS DESCRIBED BY CMS-2020-01-R: HCPCS

## 2020-07-15 PROCEDURE — 99222 1ST HOSP IP/OBS MODERATE 55: CPT | Performed by: SURGERY

## 2020-07-15 PROCEDURE — 76937 US GUIDE VASCULAR ACCESS: CPT

## 2020-07-15 PROCEDURE — 2580000003 HC RX 258: Performed by: EMERGENCY MEDICINE

## 2020-07-15 PROCEDURE — 96365 THER/PROPH/DIAG IV INF INIT: CPT

## 2020-07-15 PROCEDURE — 71045 X-RAY EXAM CHEST 1 VIEW: CPT

## 2020-07-15 PROCEDURE — 2500000003 HC RX 250 WO HCPCS: Performed by: INTERNAL MEDICINE

## 2020-07-15 PROCEDURE — 2060000000 HC ICU INTERMEDIATE R&B

## 2020-07-15 PROCEDURE — 86901 BLOOD TYPING SEROLOGIC RH(D): CPT

## 2020-07-15 PROCEDURE — 86850 RBC ANTIBODY SCREEN: CPT

## 2020-07-15 PROCEDURE — 96375 TX/PRO/DX INJ NEW DRUG ADDON: CPT

## 2020-07-15 PROCEDURE — 2700000000 HC OXYGEN THERAPY PER DAY

## 2020-07-15 PROCEDURE — 84484 ASSAY OF TROPONIN QUANT: CPT

## 2020-07-15 PROCEDURE — 85018 HEMOGLOBIN: CPT

## 2020-07-15 PROCEDURE — U0002 COVID-19 LAB TEST NON-CDC: HCPCS

## 2020-07-15 PROCEDURE — 99285 EMERGENCY DEPT VISIT HI MDM: CPT

## 2020-07-15 PROCEDURE — 36415 COLL VENOUS BLD VENIPUNCTURE: CPT

## 2020-07-15 PROCEDURE — 85014 HEMATOCRIT: CPT

## 2020-07-15 RX ORDER — SODIUM CHLORIDE 9 MG/ML
INJECTION, SOLUTION INTRAVENOUS CONTINUOUS
Status: DISCONTINUED | OUTPATIENT
Start: 2020-07-15 | End: 2020-07-18

## 2020-07-15 RX ORDER — ARFORMOTEROL TARTRATE 15 UG/2ML
15 SOLUTION RESPIRATORY (INHALATION) 2 TIMES DAILY
Status: DISCONTINUED | OUTPATIENT
Start: 2020-07-15 | End: 2020-07-19

## 2020-07-15 RX ORDER — PROMETHAZINE HYDROCHLORIDE AND CODEINE PHOSPHATE 6.25; 1 MG/5ML; MG/5ML
5 SYRUP ORAL 4 TIMES DAILY PRN
Status: ON HOLD | COMMUNITY
End: 2020-07-21 | Stop reason: HOSPADM

## 2020-07-15 RX ORDER — BISACODYL 10 MG
10 SUPPOSITORY, RECTAL RECTAL DAILY PRN
Status: DISCONTINUED | OUTPATIENT
Start: 2020-07-15 | End: 2020-07-21 | Stop reason: HOSPADM

## 2020-07-15 RX ORDER — 0.9 % SODIUM CHLORIDE 0.9 %
1000 INTRAVENOUS SOLUTION INTRAVENOUS ONCE
Status: COMPLETED | OUTPATIENT
Start: 2020-07-15 | End: 2020-07-15

## 2020-07-15 RX ORDER — CALCIUM CARBONATE 300MG(750)
400 TABLET,CHEWABLE ORAL 2 TIMES DAILY
COMMUNITY

## 2020-07-15 RX ORDER — MORPHINE SULFATE 4 MG/ML
4 INJECTION, SOLUTION INTRAMUSCULAR; INTRAVENOUS ONCE
Status: COMPLETED | OUTPATIENT
Start: 2020-07-15 | End: 2020-07-15

## 2020-07-15 RX ORDER — SODIUM CHLORIDE 0.9 % (FLUSH) 0.9 %
10 SYRINGE (ML) INJECTION PRN
Status: DISCONTINUED | OUTPATIENT
Start: 2020-07-15 | End: 2020-07-19 | Stop reason: SDUPTHER

## 2020-07-15 RX ORDER — PREDNISONE 10 MG/1
10 TABLET ORAL DAILY
Status: DISCONTINUED | OUTPATIENT
Start: 2020-07-15 | End: 2020-07-16

## 2020-07-15 RX ORDER — SODIUM CHLORIDE 0.9 % (FLUSH) 0.9 %
10 SYRINGE (ML) INJECTION EVERY 12 HOURS SCHEDULED
Status: DISCONTINUED | OUTPATIENT
Start: 2020-07-15 | End: 2020-07-19 | Stop reason: SDUPTHER

## 2020-07-15 RX ORDER — SENNA PLUS 8.6 MG/1
2 TABLET ORAL DAILY
COMMUNITY

## 2020-07-15 RX ORDER — QUINIDINE GLUCONATE 324 MG
240 TABLET, EXTENDED RELEASE ORAL
Status: ON HOLD | COMMUNITY
End: 2021-04-21

## 2020-07-15 RX ORDER — ACETAMINOPHEN 325 MG/1
650 TABLET ORAL EVERY 4 HOURS PRN
Status: DISCONTINUED | OUTPATIENT
Start: 2020-07-15 | End: 2020-07-21 | Stop reason: HOSPADM

## 2020-07-15 RX ORDER — OXYCODONE HYDROCHLORIDE AND ACETAMINOPHEN 5; 325 MG/1; MG/1
1 TABLET ORAL EVERY 6 HOURS PRN
Status: ON HOLD | COMMUNITY
End: 2020-07-21 | Stop reason: SDUPTHER

## 2020-07-15 RX ORDER — DILTIAZEM HYDROCHLORIDE 240 MG/1
240 CAPSULE, COATED, EXTENDED RELEASE ORAL DAILY
Status: DISCONTINUED | OUTPATIENT
Start: 2020-07-15 | End: 2020-07-21 | Stop reason: HOSPADM

## 2020-07-15 RX ORDER — 0.9 % SODIUM CHLORIDE 0.9 %
500 INTRAVENOUS SOLUTION INTRAVENOUS ONCE
Status: COMPLETED | OUTPATIENT
Start: 2020-07-15 | End: 2020-07-15

## 2020-07-15 RX ORDER — ALBUTEROL SULFATE 90 UG/1
2 AEROSOL, METERED RESPIRATORY (INHALATION) EVERY 6 HOURS PRN
Status: DISCONTINUED | OUTPATIENT
Start: 2020-07-15 | End: 2020-07-21 | Stop reason: HOSPADM

## 2020-07-15 RX ORDER — SODIUM CHLORIDE 0.9 % (FLUSH) 0.9 %
10 SYRINGE (ML) INJECTION EVERY 12 HOURS SCHEDULED
Status: DISCONTINUED | OUTPATIENT
Start: 2020-07-15 | End: 2020-07-21 | Stop reason: HOSPADM

## 2020-07-15 RX ORDER — ONDANSETRON 2 MG/ML
4 INJECTION INTRAMUSCULAR; INTRAVENOUS EVERY 4 HOURS PRN
Status: DISCONTINUED | OUTPATIENT
Start: 2020-07-15 | End: 2020-07-21 | Stop reason: HOSPADM

## 2020-07-15 RX ORDER — OMEPRAZOLE 40 MG/1
40 CAPSULE, DELAYED RELEASE ORAL DAILY
COMMUNITY

## 2020-07-15 RX ORDER — AMLODIPINE BESYLATE 5 MG/1
5 TABLET ORAL DAILY
COMMUNITY

## 2020-07-15 RX ORDER — SODIUM CHLORIDE 0.9 % (FLUSH) 0.9 %
10 SYRINGE (ML) INJECTION PRN
Status: DISCONTINUED | OUTPATIENT
Start: 2020-07-15 | End: 2020-07-21 | Stop reason: HOSPADM

## 2020-07-15 RX ORDER — LIDOCAINE HYDROCHLORIDE 10 MG/ML
5 INJECTION, SOLUTION EPIDURAL; INFILTRATION; INTRACAUDAL; PERINEURAL ONCE
Status: COMPLETED | OUTPATIENT
Start: 2020-07-15 | End: 2020-07-15

## 2020-07-15 RX ORDER — ALBUTEROL SULFATE 2.5 MG/3ML
2.5 SOLUTION RESPIRATORY (INHALATION) EVERY 4 HOURS PRN
Status: ON HOLD | COMMUNITY
End: 2021-04-21

## 2020-07-15 RX ORDER — ACETAMINOPHEN 650 MG/1
650 SUPPOSITORY RECTAL EVERY 4 HOURS PRN
Status: DISCONTINUED | OUTPATIENT
Start: 2020-07-15 | End: 2020-07-21 | Stop reason: HOSPADM

## 2020-07-15 RX ADMIN — TIOTROPIUM BROMIDE 18 MCG: 18 CAPSULE ORAL; RESPIRATORY (INHALATION) at 12:38

## 2020-07-15 RX ADMIN — VANCOMYCIN HYDROCHLORIDE 750 MG: 750 INJECTION, POWDER, LYOPHILIZED, FOR SOLUTION INTRAVENOUS at 05:05

## 2020-07-15 RX ADMIN — LIDOCAINE HYDROCHLORIDE 5 ML: 10 INJECTION, SOLUTION EPIDURAL; INFILTRATION; INTRACAUDAL; PERINEURAL at 14:36

## 2020-07-15 RX ADMIN — PIPERACILLIN AND TAZOBACTAM 3.38 G: 3; .375 INJECTION, POWDER, LYOPHILIZED, FOR SOLUTION INTRAVENOUS at 16:59

## 2020-07-15 RX ADMIN — PREDNISONE 10 MG: 10 TABLET ORAL at 10:06

## 2020-07-15 RX ADMIN — IOPAMIDOL 75 ML: 755 INJECTION, SOLUTION INTRAVENOUS at 03:05

## 2020-07-15 RX ADMIN — SODIUM CHLORIDE: 9 INJECTION, SOLUTION INTRAVENOUS at 08:43

## 2020-07-15 RX ADMIN — SODIUM CHLORIDE 500 ML: 9 INJECTION, SOLUTION INTRAVENOUS at 02:09

## 2020-07-15 RX ADMIN — SODIUM CHLORIDE 1000 ML: 9 INJECTION, SOLUTION INTRAVENOUS at 16:25

## 2020-07-15 RX ADMIN — CEFEPIME HYDROCHLORIDE 2 G: 2 INJECTION, POWDER, FOR SOLUTION INTRAVENOUS at 04:14

## 2020-07-15 RX ADMIN — MORPHINE SULFATE 4 MG: 4 INJECTION, SOLUTION INTRAMUSCULAR; INTRAVENOUS at 01:53

## 2020-07-15 RX ADMIN — SODIUM CHLORIDE 80 MG: 9 INJECTION, SOLUTION INTRAVENOUS at 13:01

## 2020-07-15 RX ADMIN — DILTIAZEM HYDROCHLORIDE 240 MG: 240 CAPSULE, COATED, EXTENDED RELEASE ORAL at 10:06

## 2020-07-15 RX ADMIN — ARFORMOTEROL TARTRATE 15 MCG: 15 SOLUTION RESPIRATORY (INHALATION) at 12:37

## 2020-07-15 RX ADMIN — PIPERACILLIN AND TAZOBACTAM 3.38 G: 3; .375 INJECTION, POWDER, LYOPHILIZED, FOR SOLUTION INTRAVENOUS at 08:43

## 2020-07-15 RX ADMIN — SODIUM CHLORIDE 8 MG/HR: 9 INJECTION, SOLUTION INTRAVENOUS at 13:29

## 2020-07-15 ASSESSMENT — PAIN DESCRIPTION - PAIN TYPE
TYPE: ACUTE PAIN
TYPE: ACUTE PAIN

## 2020-07-15 ASSESSMENT — PAIN DESCRIPTION - FREQUENCY: FREQUENCY: CONTINUOUS

## 2020-07-15 ASSESSMENT — PAIN SCALES - GENERAL
PAINLEVEL_OUTOF10: 7
PAINLEVEL_OUTOF10: 6
PAINLEVEL_OUTOF10: 5
PAINLEVEL_OUTOF10: 10
PAINLEVEL_OUTOF10: 4
PAINLEVEL_OUTOF10: 5

## 2020-07-15 ASSESSMENT — PAIN DESCRIPTION - LOCATION
LOCATION: ABDOMEN
LOCATION: ABDOMEN
LOCATION: ABDOMEN;BUTTOCKS

## 2020-07-15 ASSESSMENT — PAIN DESCRIPTION - PROGRESSION: CLINICAL_PROGRESSION: GRADUALLY WORSENING

## 2020-07-15 ASSESSMENT — PAIN DESCRIPTION - ORIENTATION
ORIENTATION: MID
ORIENTATION: MID

## 2020-07-15 ASSESSMENT — PAIN DESCRIPTION - DESCRIPTORS
DESCRIPTORS: ACHING;SHARP
DESCRIPTORS: ACHING;SHARP;STABBING

## 2020-07-15 ASSESSMENT — PAIN - FUNCTIONAL ASSESSMENT: PAIN_FUNCTIONAL_ASSESSMENT: PREVENTS OR INTERFERES WITH ALL ACTIVE AND SOME PASSIVE ACTIVITIES

## 2020-07-15 ASSESSMENT — PAIN DESCRIPTION - ONSET: ONSET: ON-GOING

## 2020-07-15 NOTE — ED NOTES
Changed patient's attends. Moderate amount of stool noted. 4 pressure wounds on buttocks.      India Salomon RN  07/15/20 0104

## 2020-07-15 NOTE — PROGRESS NOTES
I was asked by the ER physician to admit this patient for a small bowel obstruction, pneumonia, constipation, a AAA and hematemesis. Orders were placed based on the emergency room evaluation and report. I did not see this patient.     Jessica Elliott MD

## 2020-07-15 NOTE — PROGRESS NOTES
Patient admitted to room 5129 from ER department. He came from [facility] with abdominal Pain coffee ground emesis. Covid test came back negative. Alert & oriented, vital signs stable, sinus tachycardia on heart monitor. Voided per urinal, specimen sent. NPO for now. Brother Jairo Matuteer called & made aware of his admission. Resting in bed with call light in reach.

## 2020-07-15 NOTE — LETTER
[facility]  [address] [address]  Phone: [phone]            July 17, 2020     Patient: Slick De Leon   YOB: 1945   Date of Visit: 7/15/2020       To Whom It May Concern: This is to verify that Dolly Harris was at [facility] [place] today to visit     Slick De Leon with her fiance. If you have any questions or concerns, please don't hesitate to call.     Sincerely,        Brendon Dangelo RN

## 2020-07-15 NOTE — ED NOTES
RN from floor requested patient be swabbed and ensure negative prior to coming up.      Castro Ibarra, RN  07/15/20 0497

## 2020-07-15 NOTE — LETTER
[facility]  [address] [address]  Phone: [phone]             July 17, 2020    Patient: Ardath [place]   YOB: 1945   Date of Visit: 7/15/2020       To Whom It May Concern: This is to verify that Lilly Manzo was at [facility] [place] today, July 17, visiting the patient Ardath [place].      Sincerely,       Teja Hawkins RN         Signature:__________________________________
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
## 2020-07-15 NOTE — ED NOTES
Bed: [facility]  Expected date:   Expected time:   Means of arrival: [facility]  Comments:     Sunny Brown RN  07/15/20 0007

## 2020-07-15 NOTE — PROGRESS NOTES
PICC PLACEMENT:  Preprocedure & timeout done w primary RN MANOLO; no difficulty accessing R BRACHIAL vein; picc tip is verified using eyetok 3cg Tip Confirmation system w positive pwave and no negative deflection visualized at 0cm out; waveform printed and placed in chart; reported same and ok to use PICC to primary RN; pt tolerated procedure very well; pt will remain in bed at rest in order to promote hemostasis to the insertion site; pt is left in a position of comfort w siderails up x2, call light in reach and bed is locked in lowest position; Order for OK to use PICC is placed in EMR

## 2020-07-15 NOTE — ED NOTES
ED SBAR report provider to [facility]. Patient to be transported to Room 5129 via stretcher by ED tech.with IV medications infusing. IV site clean, dry, and intact. MEWS score and pain assessed as 5 and documented. Updated patient on plan of care.        Ivelisse Steinberg, RN  07/15/20 4216
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
## 2020-07-16 ENCOUNTER — APPOINTMENT (OUTPATIENT)
Dept: GENERAL RADIOLOGY | Age: 75
DRG: 871 | End: 2020-07-16
Payer: MEDICARE

## 2020-07-16 LAB
ANION GAP SERPL CALCULATED.3IONS-SCNC: 9 MMOL/L (ref 3–16)
BASOPHILS ABSOLUTE: 0 K/UL (ref 0–0.2)
BASOPHILS RELATIVE PERCENT: 0.4 %
BUN BLDV-MCNC: 14 MG/DL (ref 7–20)
CALCIUM SERPL-MCNC: 8.4 MG/DL (ref 8.3–10.6)
CHLORIDE BLD-SCNC: 96 MMOL/L (ref 99–110)
CO2: 28 MMOL/L (ref 21–32)
CREAT SERPL-MCNC: 0.7 MG/DL (ref 0.8–1.3)
EOSINOPHILS ABSOLUTE: 0 K/UL (ref 0–0.6)
EOSINOPHILS RELATIVE PERCENT: 0.2 %
GFR AFRICAN AMERICAN: >60
GFR NON-AFRICAN AMERICAN: >60
GLUCOSE BLD-MCNC: 92 MG/DL (ref 70–99)
HCT VFR BLD CALC: 25.8 % (ref 40.5–52.5)
HCT VFR BLD CALC: 26.2 % (ref 40.5–52.5)
HCT VFR BLD CALC: 27.4 % (ref 40.5–52.5)
HEMOGLOBIN: 8.4 G/DL (ref 13.5–17.5)
HEMOGLOBIN: 8.4 G/DL (ref 13.5–17.5)
HEMOGLOBIN: 8.6 G/DL (ref 13.5–17.5)
LACTIC ACID: 0.9 MMOL/L (ref 0.4–2)
LYMPHOCYTES ABSOLUTE: 0.8 K/UL (ref 1–5.1)
LYMPHOCYTES RELATIVE PERCENT: 7.1 %
MCH RBC QN AUTO: 30.6 PG (ref 26–34)
MCHC RBC AUTO-ENTMCNC: 31.5 G/DL (ref 31–36)
MCV RBC AUTO: 97.1 FL (ref 80–100)
MONOCYTES ABSOLUTE: 0.5 K/UL (ref 0–1.3)
MONOCYTES RELATIVE PERCENT: 4.5 %
NEUTROPHILS ABSOLUTE: 9.7 K/UL (ref 1.7–7.7)
NEUTROPHILS RELATIVE PERCENT: 87.8 %
PDW BLD-RTO: 13.6 % (ref 12.4–15.4)
PLATELET # BLD: 266 K/UL (ref 135–450)
PMV BLD AUTO: 7.5 FL (ref 5–10.5)
POTASSIUM REFLEX MAGNESIUM: 5 MMOL/L (ref 3.5–5.1)
RBC # BLD: 2.82 M/UL (ref 4.2–5.9)
SODIUM BLD-SCNC: 133 MMOL/L (ref 136–145)
WBC # BLD: 11.1 K/UL (ref 4–11)

## 2020-07-16 PROCEDURE — 85018 HEMOGLOBIN: CPT

## 2020-07-16 PROCEDURE — 94640 AIRWAY INHALATION TREATMENT: CPT

## 2020-07-16 PROCEDURE — 6360000002 HC RX W HCPCS: Performed by: NURSE PRACTITIONER

## 2020-07-16 PROCEDURE — 85025 COMPLETE CBC W/AUTO DIFF WBC: CPT

## 2020-07-16 PROCEDURE — 83605 ASSAY OF LACTIC ACID: CPT

## 2020-07-16 PROCEDURE — 6370000000 HC RX 637 (ALT 250 FOR IP): Performed by: INTERNAL MEDICINE

## 2020-07-16 PROCEDURE — 6360000002 HC RX W HCPCS: Performed by: INTERNAL MEDICINE

## 2020-07-16 PROCEDURE — 80048 BASIC METABOLIC PNL TOTAL CA: CPT

## 2020-07-16 PROCEDURE — 2700000000 HC OXYGEN THERAPY PER DAY

## 2020-07-16 PROCEDURE — 85014 HEMATOCRIT: CPT

## 2020-07-16 PROCEDURE — 2580000003 HC RX 258: Performed by: INTERNAL MEDICINE

## 2020-07-16 PROCEDURE — 2060000000 HC ICU INTERMEDIATE R&B

## 2020-07-16 PROCEDURE — 99232 SBSQ HOSP IP/OBS MODERATE 35: CPT | Performed by: SURGERY

## 2020-07-16 PROCEDURE — 74019 RADEX ABDOMEN 2 VIEWS: CPT

## 2020-07-16 PROCEDURE — 94760 N-INVAS EAR/PLS OXIMETRY 1: CPT

## 2020-07-16 PROCEDURE — C9113 INJ PANTOPRAZOLE SODIUM, VIA: HCPCS | Performed by: INTERNAL MEDICINE

## 2020-07-16 RX ORDER — DEXAMETHASONE SODIUM PHOSPHATE 4 MG/ML
6 INJECTION, SOLUTION INTRA-ARTICULAR; INTRALESIONAL; INTRAMUSCULAR; INTRAVENOUS; SOFT TISSUE DAILY
Status: DISCONTINUED | OUTPATIENT
Start: 2020-07-16 | End: 2020-07-16

## 2020-07-16 RX ORDER — MORPHINE SULFATE 2 MG/ML
2 INJECTION, SOLUTION INTRAMUSCULAR; INTRAVENOUS ONCE
Status: COMPLETED | OUTPATIENT
Start: 2020-07-16 | End: 2020-07-16

## 2020-07-16 RX ORDER — DEXAMETHASONE SODIUM PHOSPHATE 4 MG/ML
6 INJECTION, SOLUTION INTRA-ARTICULAR; INTRALESIONAL; INTRAMUSCULAR; INTRAVENOUS; SOFT TISSUE DAILY
Status: DISCONTINUED | OUTPATIENT
Start: 2020-07-16 | End: 2020-07-21 | Stop reason: HOSPADM

## 2020-07-16 RX ORDER — MORPHINE SULFATE 2 MG/ML
1 INJECTION, SOLUTION INTRAMUSCULAR; INTRAVENOUS EVERY 4 HOURS PRN
Status: DISPENSED | OUTPATIENT
Start: 2020-07-16 | End: 2020-07-17

## 2020-07-16 RX ADMIN — PIPERACILLIN AND TAZOBACTAM 3.38 G: 3; .375 INJECTION, POWDER, LYOPHILIZED, FOR SOLUTION INTRAVENOUS at 23:40

## 2020-07-16 RX ADMIN — MORPHINE SULFATE 1 MG: 2 INJECTION, SOLUTION INTRAMUSCULAR; INTRAVENOUS at 12:55

## 2020-07-16 RX ADMIN — VANCOMYCIN HYDROCHLORIDE 500 MG: 500 INJECTION, POWDER, LYOPHILIZED, FOR SOLUTION INTRAVENOUS at 04:21

## 2020-07-16 RX ADMIN — ACETAMINOPHEN 650 MG: 325 TABLET ORAL at 23:46

## 2020-07-16 RX ADMIN — ARFORMOTEROL TARTRATE 15 MCG: 15 SOLUTION RESPIRATORY (INHALATION) at 08:45

## 2020-07-16 RX ADMIN — SODIUM CHLORIDE, PRESERVATIVE FREE 10 ML: 5 INJECTION INTRAVENOUS at 09:58

## 2020-07-16 RX ADMIN — DILTIAZEM HYDROCHLORIDE 240 MG: 240 CAPSULE, COATED, EXTENDED RELEASE ORAL at 09:51

## 2020-07-16 RX ADMIN — SODIUM CHLORIDE 8 MG/HR: 9 INJECTION, SOLUTION INTRAVENOUS at 15:07

## 2020-07-16 RX ADMIN — SODIUM CHLORIDE: 9 INJECTION, SOLUTION INTRAVENOUS at 04:21

## 2020-07-16 RX ADMIN — SODIUM CHLORIDE 8 MG/HR: 9 INJECTION, SOLUTION INTRAVENOUS at 01:44

## 2020-07-16 RX ADMIN — PIPERACILLIN AND TAZOBACTAM 3.38 G: 3; .375 INJECTION, POWDER, LYOPHILIZED, FOR SOLUTION INTRAVENOUS at 17:51

## 2020-07-16 RX ADMIN — TIOTROPIUM BROMIDE INHALATION SPRAY 2 PUFF: 3.12 SPRAY, METERED RESPIRATORY (INHALATION) at 08:46

## 2020-07-16 RX ADMIN — MORPHINE SULFATE 1 MG: 2 INJECTION, SOLUTION INTRAMUSCULAR; INTRAVENOUS at 23:45

## 2020-07-16 RX ADMIN — ACETAMINOPHEN 650 MG: 325 TABLET ORAL at 09:51

## 2020-07-16 RX ADMIN — ARFORMOTEROL TARTRATE 15 MCG: 15 SOLUTION RESPIRATORY (INHALATION) at 21:33

## 2020-07-16 RX ADMIN — PIPERACILLIN AND TAZOBACTAM 3.38 G: 3; .375 INJECTION, POWDER, LYOPHILIZED, FOR SOLUTION INTRAVENOUS at 10:00

## 2020-07-16 RX ADMIN — Medication 6 MG: at 09:51

## 2020-07-16 RX ADMIN — PIPERACILLIN AND TAZOBACTAM 3.38 G: 3; .375 INJECTION, POWDER, LYOPHILIZED, FOR SOLUTION INTRAVENOUS at 00:03

## 2020-07-16 RX ADMIN — MORPHINE SULFATE 2 MG: 2 INJECTION, SOLUTION INTRAMUSCULAR; INTRAVENOUS at 01:44

## 2020-07-16 ASSESSMENT — PAIN SCALES - GENERAL
PAINLEVEL_OUTOF10: 6
PAINLEVEL_OUTOF10: 0
PAINLEVEL_OUTOF10: 7
PAINLEVEL_OUTOF10: 4
PAINLEVEL_OUTOF10: 6
PAINLEVEL_OUTOF10: 7
PAINLEVEL_OUTOF10: 5
PAINLEVEL_OUTOF10: 7
PAINLEVEL_OUTOF10: 3

## 2020-07-16 ASSESSMENT — PAIN DESCRIPTION - ORIENTATION
ORIENTATION: MID

## 2020-07-16 ASSESSMENT — PAIN DESCRIPTION - PROGRESSION
CLINICAL_PROGRESSION: NOT CHANGED
CLINICAL_PROGRESSION: RAPIDLY WORSENING
CLINICAL_PROGRESSION: GRADUALLY IMPROVING
CLINICAL_PROGRESSION: NOT CHANGED
CLINICAL_PROGRESSION: GRADUALLY WORSENING

## 2020-07-16 ASSESSMENT — PAIN DESCRIPTION - PAIN TYPE
TYPE: ACUTE PAIN

## 2020-07-16 ASSESSMENT — PAIN DESCRIPTION - FREQUENCY
FREQUENCY: CONTINUOUS

## 2020-07-16 ASSESSMENT — PAIN DESCRIPTION - LOCATION
LOCATION: ABDOMEN
LOCATION: ABDOMEN;BUTTOCKS
LOCATION: ABDOMEN
LOCATION: ABDOMEN;BUTTOCKS

## 2020-07-16 ASSESSMENT — PAIN DESCRIPTION - ONSET
ONSET: ON-GOING

## 2020-07-16 ASSESSMENT — PAIN DESCRIPTION - DESCRIPTORS
DESCRIPTORS: PATIENT UNABLE TO DESCRIBE
DESCRIPTORS: OTHER (COMMENT)
DESCRIPTORS: PATIENT UNABLE TO DESCRIBE

## 2020-07-16 NOTE — PROGRESS NOTES
Hospitalist Progress Note      PCP: Buck Conklin MD    Date of Admission: 7/15/2020        Subjective: improved abdominal pain, no cough, no fever or chills, no vomiting last night. Medications:  Reviewed    Infusion Medications    sodium chloride 75 mL/hr at 07/16/20 0421    pantoprozole (PROTONIX) infusion 8 mg/hr (07/16/20 0144)     Scheduled Medications    dexamethasone  6 mg Intravenous Daily    sodium chloride flush  10 mL Intravenous 2 times per day    vancomycin (VANCOCIN) intermittent dosing (placeholder)   Other RX Placeholder    vancomycin  500 mg Intravenous Q24H    piperacillin-tazobactam  3.375 g Intravenous Q8H    Arformoterol Tartrate  15 mcg Nebulization BID    dilTIAZem  240 mg Oral Daily    sodium chloride flush  10 mL Intravenous 2 times per day    tiotropium  2 puff Inhalation Daily     PRN Meds: sodium chloride flush, acetaminophen **OR** acetaminophen, ondansetron, bisacodyl, albuterol sulfate HFA, sodium chloride flush      Intake/Output Summary (Last 24 hours) at 7/16/2020 0739  Last data filed at 7/16/2020 0543  Gross per 24 hour   Intake 550 ml   Output 300 ml   Net 250 ml       Physical Exam Performed:    BP (!) 102/50   Pulse 79   Temp 98.4 °F (36.9 °C) (Oral)   Resp 16   Ht 5' 7\" (1.702 m)   Wt 87 lb 8.4 oz (39.7 kg)   SpO2 99%   BMI 13.71 kg/m²     General appearance: No apparent distress  Neck: Supple  Respiratory:  Normal respiratory effort. Clear to auscultation, bilaterally without Rales/Wheezes/Rhonchi. Cardiovascular: Regular rate and rhythm with normal S1/S2 without murmurs, rubs or gallops. Abdomen: Soft, mild tenderness . Musculoskeletal: No clubbing, cyanosis  Skin: Skin color, texture, turgor normal.  No rashes or lesions.   Neurologic:  Generalized weakness   Psychiatric: Alert and oriented  Capillary Refill: Brisk,< 3 seconds   Peripheral Pulses: +2 palpable, equal bilaterally       Labs:   Recent Labs     07/15/20  0147 07/15/20  1801 [A41.9]    GI bleed [K92.2]    Pneumonia [J18.9]    COPD (chronic obstructive pulmonary disease) with emphysema ([facility] [address] [J43.9]    Hypertension [I10]     1. Small bowel obstruction, likely adhesion related, n.p.o., patient without NG at this time, feels better, GS consulted, continue conservative management for now. 2.  Sepsis with tachypnea tachycardia likely due to aspiration pneumonia, and COVID19 pneumonia, IV antibiotics, Zosyn, receiving vancomycin pending MRSA probe, started on dexamethazone. 3.  Pneumonia, bilateral, likely aspiration and COVID19, COVID RAPID TEST tested negative, I ordered PCR and it is positive. On dexamethasone, consider putting patient back on po prednisone after course completion. 4.  Upper GI bleed, GI consulted, hemodynamically stable for now. 5. Acute blood loss anemia, POA, due to GI bleed, no immediate indication for blood transfusion, continue to monitor. 6. Generalized weakness, due to above, PT OT  7.   COPD, no obvious exacerbation    DVT Prophylaxis: SCD  Diet: Diet NPO Effective Now  Code Status: Full Awais Carvajal MD

## 2020-07-16 NOTE — PROGRESS NOTES
General Surgery  Daily Progress Note    Pt Name: Casa Zarina Day  Medical Record Number: [id number]  Date of Birth 1945   Today's Date: 7/16/2020    Chief Complaint   Patient presents with    Emesis     coffee ground emesis started tonight    Abdominal Pain     started tonight    Buttocks Pain     x3 days    Concern For COVID-19     tested positive on 7/1/2020       ASSESSMENT/PLAN  1. Small bowel obstruction - feeling better today. Abdominal films relatively unchanged. Minimal flatus. Will advance to clears. Discussed minimizing po intake until having more GI function. 2. Hematemesis - GI on board. Hgb dropped with hydration but has been stable x2, currently 8.6. Continue to monitor. If he has bleeding, will need EGD. 3. Covid + - contact plus isolation  4. Severe protein calorie malnutrition - currently NPO. Will advance diet when able  5. Sacral ulcers - continue local wound care      Azeem Gates has slightly improved from yesterday. Pain is present but well controlled. He has no nausea and no vomiting. He has passed flatus and has not had a bowel movement. He is tolerating ice chips. Current activity is up with assistance    OBJECTIVE  VITALS:  height is 5' 7\" (1.702 m) and weight is 87 lb 8.4 oz (39.7 kg). His oral temperature is 98.8 °F (37.1 °C). His blood pressure is 120/50 (abnormal) and his pulse is 89. His respiration is 16 and oxygen saturation is 99%. GENERAL: alert, no distress  LUNGS: normal respiratory effort, no accessory muscle use  HEART: normal rate and regular rhythm  ABDOMEN: soft, ND, minimal RLQ tenderness, no peritonitis  EXTREMITY: no cyanosis and no clubbing  I/O last 3 completed shifts:   In: 450 [IV Piggyback:450]  Out: 275 [Urine:275]  I/O this shift:  In: -   Out: 125 [Urine:125]    LABS  Recent Labs     07/15/20  0147 07/15/20  0518  07/16/20  0410   WBC 9.9  --   --  11.1*   HGB 12.3*  --    < > 8.6*  8.4*   HCT 37.7*  --    < > 27.4*  26.2*   PLT 475*  --   --  266   *  --   --  133*   K 5.2*  --   --  5.0   CL 87*  --   --  96*   CO2 33*  --   --  28   BUN 12  --   --  14   CREATININE 0.7*  --   --  0.7*   CALCIUM 9.8  --   --  8.4   INR 1.39*  --   --   --    AST 22  --   --   --    ALT 12  --   --   --    BILITOT 0.3  --   --   --    NITRU  --  Negative  --   --    COLORU  --  YELLOW  --   --     < > = values in this interval not displayed.      CBC with Differential:    Lab Results   Component Value Date    WBC 11.1 07/16/2020    RBC 2.82 07/16/2020    HGB 8.6 07/16/2020    HGB 8.4 07/16/2020    HCT 27.4 07/16/2020    HCT 26.2 07/16/2020     07/16/2020    MCV 97.1 07/16/2020    MCH 30.6 07/16/2020    MCHC 31.5 07/16/2020    RDW 13.6 07/16/2020    NRBC 1 09/23/2018    SEGSPCT 86.8 04/01/2013    BANDSPCT 2 09/24/2018    LYMPHOPCT 7.1 07/16/2020    MONOPCT 4.5 07/16/2020    MYELOPCT 2 09/23/2018    EOSPCT 0.0 02/15/2012    BASOPCT 0.4 07/16/2020    MONOSABS 0.5 07/16/2020    LYMPHSABS 0.8 07/16/2020    EOSABS 0.0 07/16/2020    BASOSABS 0.0 07/16/2020    DIFFTYPE AUTOMATED DIFFERENTIAL 02/15/2012     BMP:    Lab Results   Component Value Date     07/16/2020    K 5.0 07/16/2020    CL 96 07/16/2020    CO2 28 07/16/2020    BUN 14 07/16/2020    LABALBU 3.4 07/15/2020    CREATININE 0.7 07/16/2020    CALCIUM 8.4 07/16/2020    GFRAA >60 07/16/2020    GFRAA 110 04/02/2013    LABGLOM >60 07/16/2020    GLUCOSE 92 07/16/2020     Hepatic Function Panel:    Lab Results   Component Value Date    ALKPHOS 116 07/15/2020    ALT 12 07/15/2020    AST 22 07/15/2020    PROT 7.2 07/15/2020    PROT 6.1 11/21/2011    BILITOT 0.3 07/15/2020    BILIDIR <0.2 05/01/2015    IBILI see below 05/01/2015    LABALBU 3.4 07/15/2020     Magnesium:    Lab Results   Component Value Date    MG 2.40 09/26/2018     Phosphorus:    Lab Results   Component Value Date    PHOS 2.9 05/09/2015         Chanda Leonard MD  Electronically signed 7/16/2020 at 4:47 PM

## 2020-07-16 NOTE — PROGRESS NOTES
4 Eyes Skin Assessment     The patient is being assess for  Transfer to New Unit    I agree that 2 RN's have performed a thorough Head to Toe Skin Assessment on the patient. ALL assessment sites listed below have been assessed. Areas assessed by both nurses:   [x]   Head, Face, and Ears   [x]   Shoulders, Back, and Chest  [x]   Arms, Elbows, and Hands   [x]   Coccyx, Sacrum, and IschIum  [x]   Legs, Feet, and Heels        Does the Patient have Skin Breakdown?   Yes LDA WOUND CARE was Initiated documentation include the Maria A-wound, Wound Assessment, Measurements, Dressing Treatment, Drainage, and Color\",         Esteban Prevention initiated:  Yes   Wound Care Orders initiated:  Yes      [id number]  nurse consulted for Pressure Injury (Stage 3,4, Unstageable, DTI, NWPT, and Complex wounds), New and Established Ostomies:  Yes      Nurse 1 eSignature: Electronically signed by Aicha Calero RN on 7/15/20 at 11:45 PM EDT    **SHARE this note so that the co-signing nurse is able to place an eSignature**    Nurse 2 eSignature: Electronically signed by Comfort Gutierrez RN on 7/15/20 at 11:49 PM EDT

## 2020-07-16 NOTE — PROGRESS NOTES
Pt arrived to floor via stretcher from 5W, and transferred to bed. Telemetry confirmed with [facility]. Patient oriented to room and use of call light. Call light and personal items within reach. Admission and assessment initiated. POC and education initiated and reviewed with patient. Droplet Plus isolation initiated. Patient denied further needs or questions at this time. Will continue to monitor.     Electronically signed by Pedro Luis Page RN on 7/15/2020 at 11:45 PM

## 2020-07-16 NOTE — FLOWSHEET NOTE
Mr. De Leon called [facility] office requesting financial POA papers. This  informed Mr. De Leon that we do not assist with financial POA - we don't have the paperwork and we don't have a notary at the hospital.  Informed him that we assist with HEALTHCARE POA.  informed Mr. De Leon that financial POA paperwork can be accessed online and a mobile notary can be called to the hospital.  He then let me know he is COVID+ which then meant, I told him, that no one could come to his hospital room to assist him.     07/16/20 1703   Encounter Summary   Services provided to: Patient   Referral/Consult From: Patient   Support System Family members   Continue Visiting   (7/16 Support to pt via phone - wants financial POA papers )   Complexity of Encounter Low   Length of Encounter 15 minutes   Routine   Type Initial   Assessment Coping   Intervention Active listening;Explored feelings, thoughts, concerns;[place]   Outcome Engaged in conversation
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
## 2020-07-17 LAB
ANION GAP SERPL CALCULATED.3IONS-SCNC: 9 MMOL/L (ref 3–16)
BASOPHILS ABSOLUTE: 0 K/UL (ref 0–0.2)
BASOPHILS RELATIVE PERCENT: 0.2 %
BUN BLDV-MCNC: 13 MG/DL (ref 7–20)
CALCIUM SERPL-MCNC: 8.5 MG/DL (ref 8.3–10.6)
CHLORIDE BLD-SCNC: 95 MMOL/L (ref 99–110)
CO2: 29 MMOL/L (ref 21–32)
CREAT SERPL-MCNC: 0.7 MG/DL (ref 0.8–1.3)
EKG ATRIAL RATE: 70 BPM
EKG DIAGNOSIS: NORMAL
EKG P AXIS: 72 DEGREES
EKG P-R INTERVAL: 150 MS
EKG Q-T INTERVAL: 384 MS
EKG QRS DURATION: 72 MS
EKG QTC CALCULATION (BAZETT): 414 MS
EKG R AXIS: 71 DEGREES
EKG T AXIS: 81 DEGREES
EKG VENTRICULAR RATE: 70 BPM
EOSINOPHILS ABSOLUTE: 0 K/UL (ref 0–0.6)
EOSINOPHILS RELATIVE PERCENT: 0 %
GFR AFRICAN AMERICAN: >60
GFR NON-AFRICAN AMERICAN: >60
GLUCOSE BLD-MCNC: 131 MG/DL (ref 70–99)
HCT VFR BLD CALC: 25.5 % (ref 40.5–52.5)
HCT VFR BLD CALC: 25.9 % (ref 40.5–52.5)
HEMOGLOBIN: 8.2 G/DL (ref 13.5–17.5)
HEMOGLOBIN: 8.4 G/DL (ref 13.5–17.5)
LYMPHOCYTES ABSOLUTE: 0.4 K/UL (ref 1–5.1)
LYMPHOCYTES RELATIVE PERCENT: 10.9 %
MCH RBC QN AUTO: 31 PG (ref 26–34)
MCHC RBC AUTO-ENTMCNC: 32.4 G/DL (ref 31–36)
MCV RBC AUTO: 95.9 FL (ref 80–100)
MONOCYTES ABSOLUTE: 0.3 K/UL (ref 0–1.3)
MONOCYTES RELATIVE PERCENT: 7.2 %
MRSA CULTURE ONLY: NORMAL
NEUTROPHILS ABSOLUTE: 3.3 K/UL (ref 1.7–7.7)
NEUTROPHILS RELATIVE PERCENT: 81.7 %
PDW BLD-RTO: 13.5 % (ref 12.4–15.4)
PLATELET # BLD: 262 K/UL (ref 135–450)
PMV BLD AUTO: 7.4 FL (ref 5–10.5)
POTASSIUM REFLEX MAGNESIUM: 4.2 MMOL/L (ref 3.5–5.1)
RBC # BLD: 2.65 M/UL (ref 4.2–5.9)
SODIUM BLD-SCNC: 133 MMOL/L (ref 136–145)
TROPONIN: <0.01 NG/ML
VANCOMYCIN TROUGH: 5.5 UG/ML (ref 10–20)
WBC # BLD: 4 K/UL (ref 4–11)

## 2020-07-17 PROCEDURE — 80048 BASIC METABOLIC PNL TOTAL CA: CPT

## 2020-07-17 PROCEDURE — 87205 SMEAR GRAM STAIN: CPT

## 2020-07-17 PROCEDURE — C9113 INJ PANTOPRAZOLE SODIUM, VIA: HCPCS | Performed by: INTERNAL MEDICINE

## 2020-07-17 PROCEDURE — 6370000000 HC RX 637 (ALT 250 FOR IP): Performed by: INTERNAL MEDICINE

## 2020-07-17 PROCEDURE — 2580000003 HC RX 258: Performed by: INTERNAL MEDICINE

## 2020-07-17 PROCEDURE — 6360000002 HC RX W HCPCS: Performed by: INTERNAL MEDICINE

## 2020-07-17 PROCEDURE — 94640 AIRWAY INHALATION TREATMENT: CPT

## 2020-07-17 PROCEDURE — 87070 CULTURE OTHR SPECIMN AEROBIC: CPT

## 2020-07-17 PROCEDURE — 2060000000 HC ICU INTERMEDIATE R&B

## 2020-07-17 PROCEDURE — 80202 ASSAY OF VANCOMYCIN: CPT

## 2020-07-17 PROCEDURE — 99232 SBSQ HOSP IP/OBS MODERATE 35: CPT | Performed by: SURGERY

## 2020-07-17 PROCEDURE — 87077 CULTURE AEROBIC IDENTIFY: CPT

## 2020-07-17 PROCEDURE — 2700000000 HC OXYGEN THERAPY PER DAY

## 2020-07-17 PROCEDURE — 94761 N-INVAS EAR/PLS OXIMETRY MLT: CPT

## 2020-07-17 PROCEDURE — 85025 COMPLETE CBC W/AUTO DIFF WBC: CPT

## 2020-07-17 PROCEDURE — 93005 ELECTROCARDIOGRAM TRACING: CPT | Performed by: INTERNAL MEDICINE

## 2020-07-17 PROCEDURE — 93010 ELECTROCARDIOGRAM REPORT: CPT | Performed by: INTERNAL MEDICINE

## 2020-07-17 PROCEDURE — 84484 ASSAY OF TROPONIN QUANT: CPT

## 2020-07-17 PROCEDURE — 87186 SC STD MICRODIL/AGAR DIL: CPT

## 2020-07-17 RX ADMIN — PIPERACILLIN AND TAZOBACTAM 3.38 G: 3; .375 INJECTION, POWDER, LYOPHILIZED, FOR SOLUTION INTRAVENOUS at 15:31

## 2020-07-17 RX ADMIN — SODIUM CHLORIDE 8 MG/HR: 9 INJECTION, SOLUTION INTRAVENOUS at 04:48

## 2020-07-17 RX ADMIN — TIOTROPIUM BROMIDE INHALATION SPRAY 2 PUFF: 3.12 SPRAY, METERED RESPIRATORY (INHALATION) at 10:06

## 2020-07-17 RX ADMIN — ANORECTAL OINTMENT: 15.7; .44; 24; 20.6 OINTMENT TOPICAL at 15:36

## 2020-07-17 RX ADMIN — VANCOMYCIN HYDROCHLORIDE 750 MG: 750 INJECTION, POWDER, LYOPHILIZED, FOR SOLUTION INTRAVENOUS at 04:51

## 2020-07-17 RX ADMIN — SODIUM CHLORIDE 8 MG/HR: 9 INJECTION, SOLUTION INTRAVENOUS at 18:15

## 2020-07-17 RX ADMIN — DILTIAZEM HYDROCHLORIDE 240 MG: 240 CAPSULE, COATED, EXTENDED RELEASE ORAL at 08:53

## 2020-07-17 RX ADMIN — Medication 6 MG: at 08:53

## 2020-07-17 RX ADMIN — ARFORMOTEROL TARTRATE 15 MCG: 15 SOLUTION RESPIRATORY (INHALATION) at 21:24

## 2020-07-17 RX ADMIN — ARFORMOTEROL TARTRATE 15 MCG: 15 SOLUTION RESPIRATORY (INHALATION) at 10:06

## 2020-07-17 RX ADMIN — SODIUM CHLORIDE, PRESERVATIVE FREE 10 ML: 5 INJECTION INTRAVENOUS at 09:04

## 2020-07-17 RX ADMIN — Medication 10 ML: at 08:59

## 2020-07-17 RX ADMIN — PIPERACILLIN AND TAZOBACTAM 3.38 G: 3; .375 INJECTION, POWDER, LYOPHILIZED, FOR SOLUTION INTRAVENOUS at 08:53

## 2020-07-17 ASSESSMENT — PAIN SCALES - GENERAL
PAINLEVEL_OUTOF10: 5
PAINLEVEL_OUTOF10: 3
PAINLEVEL_OUTOF10: 3
PAINLEVEL_OUTOF10: 4
PAINLEVEL_OUTOF10: 0

## 2020-07-17 ASSESSMENT — PAIN DESCRIPTION - PAIN TYPE: TYPE: ACUTE PAIN

## 2020-07-17 ASSESSMENT — PAIN - FUNCTIONAL ASSESSMENT: PAIN_FUNCTIONAL_ASSESSMENT: PREVENTS OR INTERFERES SOME ACTIVE ACTIVITIES AND ADLS

## 2020-07-17 ASSESSMENT — PAIN DESCRIPTION - FREQUENCY: FREQUENCY: CONTINUOUS

## 2020-07-17 ASSESSMENT — PAIN DESCRIPTION - ONSET: ONSET: ON-GOING

## 2020-07-17 ASSESSMENT — PAIN DESCRIPTION - PROGRESSION: CLINICAL_PROGRESSION: NOT CHANGED

## 2020-07-17 ASSESSMENT — PAIN DESCRIPTION - DESCRIPTORS: DESCRIPTORS: DISCOMFORT

## 2020-07-17 ASSESSMENT — PAIN DESCRIPTION - LOCATION: LOCATION: ABDOMEN;BUTTOCKS

## 2020-07-17 NOTE — CARE COORDINATION
Wound care consulted for complex wounds on sacrum and penis. Unable to see patient due to +Covid. Spoke with bedside RN Ivan Mercedes and reviewed chart. Pt has wounds on buttock that appear to be a stage 2/3. Bedside RN  unable to assess penis wound at this time due to patient refusal. Pt also refusing turns. Would recommend using calmoseptine for buttock and pascual area. Specialty bed ordered. Bedside RN to call wound care back later with wound assessment. Wound care orders placed. Will continue to follow.      Electronically signed by Adriana De Paz RN on 7/17/2020 at 1:11 PM

## 2020-07-17 NOTE — PROGRESS NOTES
[facility]U informed this RN that patient had a slightly elevated ST segment on the monitor. Dr. Laura Herron aware and a stat EKG was conducted. Physician made aware that EKG was complete. Patient alert and oriented and did not state any symptoms at time this RN was made aware of potential rhythm changes.     Electronically signed by Ritu Langston RN on 7/17/2020 at 1:24 PM

## 2020-07-17 NOTE — PROGRESS NOTES
General Surgery  Daily Progress Note    Pt Name: Monty De Leon  Medical Record Number: [id number]  Date of Birth 1945   Today's Date: 7/17/2020    Chief Complaint   Patient presents with    Emesis     coffee ground emesis started tonight    Abdominal Pain     started tonight    Buttocks Pain     x3 days    Concern For COVID-19     tested positive on 7/1/2020       ASSESSMENT/PLAN  1. Small bowel obstruction - continues to feel better. Passing flatus and had a small BM today. Will advance to fulls. 2. Hematemesis - GI on board. Hgb stable at 8.2. Will change labs to daily. If he has bleeding, will need EGD. 3. Covid + - contact plus isolation  4. Severe protein calorie malnutrition - currently on fulls. He states that he does not like any of the nutritional supplements - boost, ensure, etc  5. Sacral ulcers - continue local wound care      Munira Morales has slightly improved from yesterday. Pain is present but well controlled. He has no nausea and no vomiting. He has passed flatus and has not had a bowel movement. He is tolerating ice chips. Current activity is up with assistance    OBJECTIVE  VITALS:  height is 5' 7\" (1.702 m) and weight is 85 lb 5.1 oz (38.7 kg). His oral temperature is 98.3 °F (36.8 °C). His blood pressure is 136/60 and his pulse is 84. His respiration is 20 and oxygen saturation is 97%. GENERAL: alert, no distress  LUNGS: normal respiratory effort, no accessory muscle use  HEART: normal rate and regular rhythm  ABDOMEN: soft, ND, minimal RLQ tenderness, no peritonitis  EXTREMITY: no cyanosis and no clubbing  I/O last 3 completed shifts: In: 350 [IV Piggyback:350]  Out: 8642 [OYZ:8194]  I/O this shift:   In: 450 [P.O.:450]  Out: 750 [Urine:750]    LABS  Recent Labs     07/15/20  0147 07/15/20  0518  07/17/20  0510   WBC 9.9  --    < > 4.0   HGB 12.3*  --    < > 8.2*   HCT 37.7*  --    < > 25.5*   *  --    < > 262   *  --    < > 133*   K 5.2*  --    < > 4.2   CL 87*  --    < > 95*   CO2 33*  --    < > 29   BUN 12  --    < > 13   CREATININE 0.7*  --    < > 0.7*   CALCIUM 9.8  --    < > 8.5   INR 1.39*  --   --   --    AST 22  --   --   --    ALT 12  --   --   --    BILITOT 0.3  --   --   --    NITRU  --  Negative  --   --    COLORU  --  YELLOW  --   --     < > = values in this interval not displayed.      CBC with Differential:    Lab Results   Component Value Date    WBC 4.0 07/17/2020    RBC 2.65 07/17/2020    HGB 8.2 07/17/2020    HCT 25.5 07/17/2020     07/17/2020    MCV 95.9 07/17/2020    MCH 31.0 07/17/2020    MCHC 32.4 07/17/2020    RDW 13.5 07/17/2020    NRBC 1 09/23/2018    SEGSPCT 86.8 04/01/2013    BANDSPCT 2 09/24/2018    LYMPHOPCT 10.9 07/17/2020    MONOPCT 7.2 07/17/2020    MYELOPCT 2 09/23/2018    EOSPCT 0.0 02/15/2012    BASOPCT 0.2 07/17/2020    MONOSABS 0.3 07/17/2020    LYMPHSABS 0.4 07/17/2020    EOSABS 0.0 07/17/2020    BASOSABS 0.0 07/17/2020    DIFFTYPE AUTOMATED DIFFERENTIAL 02/15/2012     BMP:    Lab Results   Component Value Date     07/17/2020    K 4.2 07/17/2020    CL 95 07/17/2020    CO2 29 07/17/2020    BUN 13 07/17/2020    LABALBU 3.4 07/15/2020    CREATININE 0.7 07/17/2020    CALCIUM 8.5 07/17/2020    GFRAA >60 07/17/2020    GFRAA 110 04/02/2013    LABGLOM >60 07/17/2020    GLUCOSE 131 07/17/2020     Hepatic Function Panel:    Lab Results   Component Value Date    ALKPHOS 116 07/15/2020    ALT 12 07/15/2020    AST 22 07/15/2020    PROT 7.2 07/15/2020    PROT 6.1 11/21/2011    BILITOT 0.3 07/15/2020    BILIDIR <0.2 05/01/2015    IBILI see below 05/01/2015    LABALBU 3.4 07/15/2020     Magnesium:    Lab Results   Component Value Date    MG 2.40 09/26/2018     Phosphorus:    Lab Results   Component Value Date    PHOS 2.9 05/09/2015         Millie Lee MD  Electronically signed 7/17/2020 at 10:54 AM

## 2020-07-17 NOTE — CARE COORDINATION
Received voicemail from Kacie Bradley at [facility] to check on patient's status. Left confidential message for Kacie Bradley at [phone] with update.     Electronically signed by Cornelio Jaffe RN on 7/17/2020 at 4:34 PM

## 2020-07-17 NOTE — PROGRESS NOTES
Hospitalist Progress Note      PCP: Nafisa Taveras MD    Date of Admission: 7/15/2020      Subjective: feels ok, no chest pain or SOB, at baseline oxygen, no fever or chills, improved abdominal pain, tolerating po well, had small BM and passing flatus. Medications:  Reviewed    Infusion Medications    sodium chloride 75 mL/hr at 07/16/20 0421    pantoprozole (PROTONIX) infusion 8 mg/hr (07/17/20 0448)     Scheduled Medications    vancomycin  750 mg Intravenous Q24H    dexamethasone  6 mg Intravenous Daily    sodium chloride flush  10 mL Intravenous 2 times per day    vancomycin (VANCOCIN) intermittent dosing (placeholder)   Other RX Placeholder    piperacillin-tazobactam  3.375 g Intravenous Q8H    Arformoterol Tartrate  15 mcg Nebulization BID    dilTIAZem  240 mg Oral Daily    sodium chloride flush  10 mL Intravenous 2 times per day    tiotropium  2 puff Inhalation Daily     PRN Meds: sodium chloride flush, acetaminophen **OR** acetaminophen, ondansetron, bisacodyl, albuterol sulfate HFA, sodium chloride flush      Intake/Output Summary (Last 24 hours) at 7/17/2020 1147  Last data filed at 7/17/2020 1139  Gross per 24 hour   Intake 920 ml   Output 2125 ml   Net -1205 ml       Physical Exam Performed:    BP (!) 132/56   Pulse 75   Temp 98.8 °F (37.1 °C) (Oral)   Resp 16   Ht 5' 7\" (1.702 m)   Wt 85 lb 5.1 oz (38.7 kg)   SpO2 92%   BMI 13.36 kg/m²     General appearance: No apparent distress  Neck: Supple  Respiratory:  Normal respiratory effort. Clear to auscultation, bilaterally without Rales/Wheezes/Rhonchi. Cardiovascular: Regular rate and rhythm with normal S1/S2 without murmurs, rubs or gallops. Abdomen: Soft, non-tender  Musculoskeletal: No clubbing, cyanosis   Skin: Skin color, texture, turgor normal.  No rashes or lesions.   Neurologic:  No focal weakness   Psychiatric: Alert and oriented  Capillary Refill: Brisk,< 3 seconds   Peripheral Pulses: +2 palpable, equal bilaterally Labs:   Recent Labs     07/15/20  0147  07/16/20  0410 07/16/20  1830 07/16/20  2320 07/17/20  0510   WBC 9.9  --  11.1*  --   --  4.0   HGB 12.3*   < > 8.6*  8.4* 8.4* 8.4* 8.2*   HCT 37.7*   < > 27.4*  26.2* 25.8* 25.9* 25.5*   *  --  266  --   --  262    < > = values in this interval not displayed. Recent Labs     07/15/20  0147 07/16/20  0410 07/17/20  0510   * 133* 133*   K 5.2* 5.0 4.2   CL 87* 96* 95*   CO2 33* 28 29   BUN 12 14 13   CREATININE 0.7* 0.7* 0.7*   CALCIUM 9.8 8.4 8.5     Recent Labs     07/15/20  0147   AST 22   ALT 12   BILITOT 0.3   ALKPHOS 116     Recent Labs     07/15/20  0147   INR 1.39*     Recent Labs     07/15/20  0147   TROPONINI <0.01       Urinalysis:      Lab Results   Component Value Date    NITRU Negative 07/15/2020    WBCUA 1 07/15/2020    BACTERIA 1+ 06/17/2014    RBCUA 4 07/15/2020    RBCUA 2 11/19/2011    BLOODU Negative 07/15/2020    SPECGRAV >1.030 07/15/2020    GLUCOSEU Negative 07/15/2020       Radiology:  XR ABDOMEN (2 VIEWS)   Final Result   Slight worsening of small bowel dilatation. No significant change otherwise. CT ABDOMEN PELVIS W IV CONTRAST Additional Contrast? None   Final Result   Mechanical small bowel obstruction with transition point in the right   hemiabdomen (see annotated images), possibly due to an internal hernia or   adhesion. Consider nasogastric tube decompression of the stomach. Large colonic stool volume suggesting constipation. Colonic diverticulosis. 4.4 cm infrarenal abdominal aortic aneurysm. See recommendations for   follow-up. Nodular consolidative opacities at the left greater than right lung bases. Differential considerations would include multifocal pneumonia, aspiration   sequela or possibly lipoid pneumonia.   Recommend three-month follow-up CT   chest without and with contrast.      RECOMMENDATIONS:   For management of fusiform AAA:      4.0-4.4 cm AAA, recommend follow-up every 12 months and recommend vascular   consultation. References: Iliana Maldonado Radiol 2013; 49(09):461-761; J Vasc Surg. 2018; 67:2-77         XR CHEST PORTABLE   Final Result   1. Emphysema. 2. No acute cardiopulmonary disease. Assessment/Plan:    Active Hospital Problems    Diagnosis    Small bowel obstruction ([facility] [address] [K56.609]    Sepsis ([facility] [address] [A41.9]    GI bleed [K92.2]    Pneumonia [J18.9]    COPD (chronic obstructive pulmonary disease) with emphysema ([facility] [address] [J43.9]    Hypertension [I10]     1.  Small bowel obstruction, likely adhesion related, advancing diet to full, feels better, GS consulted, continue conservative management for now. 2.  Sepsis with tachypnea tachycardia likely due to aspiration pneumonia, and COVID19 pneumonia, IV antibiotics, Zosyn, receiving vancomycin pending MRSA probe, started on dexamethazone. 3.  Pneumonia, bilateral, likely aspiration and COVID19, COVID RAPID TEST tested negative, I ordered PCR and it is positive. On dexamethasone, consider putting patient back on po prednisone after course completion. 4.  Upper GI bleed, GI consulted, hemodynamically stable for now. H&H dropped significantly initially, now more stable. 5. Acute blood loss anemia, POA, due to GI bleed, no immediate indication for blood transfusion, continue to monitor. 6. Generalized weakness, due to above, PT OT  7. Sacral ulcer, wound care.        Diet: DIET FULL LIQUID;  Code Status: Full Code      Marlowe Kawasaki, MD
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
## 2020-07-17 NOTE — PLAN OF CARE
Problem: Pain:  Goal: Pain level will decrease  Description: Pain level will decrease  Outcome: Ongoing  Goal: Control of acute pain  Description: Control of acute pain  Outcome: Ongoing  Goal: Control of chronic pain  Description: Control of chronic pain  Outcome: Ongoing     Problem: Falls - Risk of:  Goal: Will remain free from falls  Description: Will remain free from falls  Outcome: Ongoing  Goal: Absence of physical injury  Description: Absence of physical injury  Outcome: Ongoing     Problem: Skin Integrity:  Goal: Will show no infection signs and symptoms  Description: Will show no infection signs and symptoms  Outcome: Ongoing  Goal: Absence of new skin breakdown  Description: Absence of new skin breakdown  Outcome: Ongoing  Goal: Risk for impaired skin integrity will decrease  Description: Risk for impaired skin integrity will decrease  Outcome: Ongoing     Problem: SAFETY  Goal: Free from accidental physical injury  Outcome: Ongoing  Goal: Free from intentional harm  Outcome: Ongoing     Problem: DAILY CARE  Goal: Daily care needs are met  Outcome: Ongoing     Problem: PAIN  Goal: Patient's pain/discomfort is manageable  Outcome: Ongoing     Problem: SKIN INTEGRITY  Goal: Skin integrity is maintained or improved  Outcome: Ongoing     Problem:  Bowel/Gastric:  Goal: Control of bowel function will improve  Description: Control of bowel function will improve  Outcome: Ongoing  Goal: Ability to achieve a regular elimination pattern will improve  Description: Ability to achieve a regular elimination pattern will improve  Outcome: Ongoing     Problem: Nutritional:  Goal: Ability to follow a diet with enough fiber (20 to 30 grams) for normal bowel function will improve  Description: Ability to follow a diet with enough fiber (20 to 30 grams) for normal bowel function will improve  Outcome: Ongoing     Problem: Airway Clearance - Ineffective  Goal: Achieve or maintain patent airway  Outcome: Ongoing     Problem: Gas Exchange - Impaired  Goal: Absence of hypoxia  Outcome: Ongoing  Goal: Promote optimal lung function  Outcome: Ongoing     Problem: Breathing Pattern - Ineffective  Goal: Ability to achieve and maintain a regular respiratory rate  Outcome: Ongoing     Problem:  Body Temperature -  Risk of, Imbalanced  Goal: Ability to maintain a body temperature within defined limits  Outcome: Ongoing  Goal: Will regain or maintain usual level of consciousness  Outcome: Ongoing  Goal: Complications related to the disease process, condition or treatment will be avoided or minimized  Outcome: Ongoing     Problem: Isolation Precautions - Risk of Spread of Infection  Goal: Prevent transmission of infection  Outcome: Ongoing     Problem: Nutrition Deficits  Goal: Optimize nutrtional status  Outcome: Ongoing     Problem: Risk for Fluid Volume Deficit  Goal: Maintain normal heart rhythm  Outcome: Ongoing  Goal: Maintain absence of muscle cramping  Outcome: Ongoing  Goal: Maintain normal serum potassium, sodium, calcium, phosphorus, and pH  Outcome: Ongoing     Problem: Loneliness or Risk for Loneliness  Goal: Demonstrate positive use of time alone when socialization is not possible  Outcome: Ongoing     Problem: Fatigue  Goal: Verbalize increase energy and improved vitality  Outcome: Ongoing     Problem: Patient Education: Go to Patient Education Activity  Goal: Patient/Family Education  Outcome: Ongoing

## 2020-07-18 LAB
ANION GAP SERPL CALCULATED.3IONS-SCNC: 9 MMOL/L (ref 3–16)
BASOPHILS ABSOLUTE: 0 K/UL (ref 0–0.2)
BASOPHILS RELATIVE PERCENT: 0.1 %
BUN BLDV-MCNC: 9 MG/DL (ref 7–20)
CALCIUM SERPL-MCNC: 8.5 MG/DL (ref 8.3–10.6)
CHLORIDE BLD-SCNC: 96 MMOL/L (ref 99–110)
CO2: 29 MMOL/L (ref 21–32)
CREAT SERPL-MCNC: 0.7 MG/DL (ref 0.8–1.3)
EOSINOPHILS ABSOLUTE: 0 K/UL (ref 0–0.6)
EOSINOPHILS RELATIVE PERCENT: 0 %
GFR AFRICAN AMERICAN: >60
GFR NON-AFRICAN AMERICAN: >60
GLUCOSE BLD-MCNC: 200 MG/DL (ref 70–99)
HCT VFR BLD CALC: 25.1 % (ref 40.5–52.5)
HEMOGLOBIN: 8.3 G/DL (ref 13.5–17.5)
LYMPHOCYTES ABSOLUTE: 0.4 K/UL (ref 1–5.1)
LYMPHOCYTES RELATIVE PERCENT: 6.8 %
MCH RBC QN AUTO: 31.6 PG (ref 26–34)
MCHC RBC AUTO-ENTMCNC: 33.3 G/DL (ref 31–36)
MCV RBC AUTO: 95.1 FL (ref 80–100)
MONOCYTES ABSOLUTE: 0.4 K/UL (ref 0–1.3)
MONOCYTES RELATIVE PERCENT: 7.3 %
NEUTROPHILS ABSOLUTE: 4.6 K/UL (ref 1.7–7.7)
NEUTROPHILS RELATIVE PERCENT: 85.8 %
PDW BLD-RTO: 13.5 % (ref 12.4–15.4)
PLATELET # BLD: 232 K/UL (ref 135–450)
PMV BLD AUTO: 7 FL (ref 5–10.5)
POTASSIUM REFLEX MAGNESIUM: 4.1 MMOL/L (ref 3.5–5.1)
RBC # BLD: 2.64 M/UL (ref 4.2–5.9)
SODIUM BLD-SCNC: 134 MMOL/L (ref 136–145)
WBC # BLD: 5.4 K/UL (ref 4–11)

## 2020-07-18 PROCEDURE — 6370000000 HC RX 637 (ALT 250 FOR IP): Performed by: INTERNAL MEDICINE

## 2020-07-18 PROCEDURE — 6370000000 HC RX 637 (ALT 250 FOR IP): Performed by: SURGERY

## 2020-07-18 PROCEDURE — 99232 SBSQ HOSP IP/OBS MODERATE 35: CPT | Performed by: SURGERY

## 2020-07-18 PROCEDURE — 2580000003 HC RX 258: Performed by: INTERNAL MEDICINE

## 2020-07-18 PROCEDURE — 2700000000 HC OXYGEN THERAPY PER DAY

## 2020-07-18 PROCEDURE — APPNB30 APP NON BILLABLE TIME 0-30 MINS: Performed by: PHYSICIAN ASSISTANT

## 2020-07-18 PROCEDURE — 6360000002 HC RX W HCPCS: Performed by: INTERNAL MEDICINE

## 2020-07-18 PROCEDURE — APPSS15 APP SPLIT SHARED TIME 0-15 MINUTES: Performed by: PHYSICIAN ASSISTANT

## 2020-07-18 PROCEDURE — 80048 BASIC METABOLIC PNL TOTAL CA: CPT

## 2020-07-18 PROCEDURE — 85025 COMPLETE CBC W/AUTO DIFF WBC: CPT

## 2020-07-18 PROCEDURE — 94761 N-INVAS EAR/PLS OXIMETRY MLT: CPT

## 2020-07-18 PROCEDURE — C9113 INJ PANTOPRAZOLE SODIUM, VIA: HCPCS | Performed by: INTERNAL MEDICINE

## 2020-07-18 PROCEDURE — 2060000000 HC ICU INTERMEDIATE R&B

## 2020-07-18 PROCEDURE — 94640 AIRWAY INHALATION TREATMENT: CPT

## 2020-07-18 RX ORDER — SODIUM CHLORIDE 9 MG/ML
10 INJECTION INTRAVENOUS 2 TIMES DAILY
Status: DISCONTINUED | OUTPATIENT
Start: 2020-07-18 | End: 2020-07-21 | Stop reason: HOSPADM

## 2020-07-18 RX ORDER — PANTOPRAZOLE SODIUM 40 MG/10ML
40 INJECTION, POWDER, LYOPHILIZED, FOR SOLUTION INTRAVENOUS 2 TIMES DAILY
Status: DISCONTINUED | OUTPATIENT
Start: 2020-07-18 | End: 2020-07-21 | Stop reason: HOSPADM

## 2020-07-18 RX ORDER — POLYETHYLENE GLYCOL 3350 17 G/17G
17 POWDER, FOR SOLUTION ORAL DAILY
Status: DISCONTINUED | OUTPATIENT
Start: 2020-07-18 | End: 2020-07-21 | Stop reason: HOSPADM

## 2020-07-18 RX ADMIN — ANORECTAL OINTMENT: 15.7; .44; 24; 20.6 OINTMENT TOPICAL at 10:00

## 2020-07-18 RX ADMIN — ARFORMOTEROL TARTRATE 15 MCG: 15 SOLUTION RESPIRATORY (INHALATION) at 10:25

## 2020-07-18 RX ADMIN — ARFORMOTEROL TARTRATE 15 MCG: 15 SOLUTION RESPIRATORY (INHALATION) at 20:30

## 2020-07-18 RX ADMIN — POLYETHYLENE GLYCOL 3350 17 G: 17 POWDER, FOR SOLUTION ORAL at 13:54

## 2020-07-18 RX ADMIN — SODIUM CHLORIDE, PRESERVATIVE FREE 10 ML: 5 INJECTION INTRAVENOUS at 10:01

## 2020-07-18 RX ADMIN — TIOTROPIUM BROMIDE INHALATION SPRAY 2 PUFF: 3.12 SPRAY, METERED RESPIRATORY (INHALATION) at 10:25

## 2020-07-18 RX ADMIN — Medication 10 ML: at 18:28

## 2020-07-18 RX ADMIN — PIPERACILLIN AND TAZOBACTAM 3.38 G: 3; .375 INJECTION, POWDER, LYOPHILIZED, FOR SOLUTION INTRAVENOUS at 18:28

## 2020-07-18 RX ADMIN — PIPERACILLIN AND TAZOBACTAM 3.38 G: 3; .375 INJECTION, POWDER, LYOPHILIZED, FOR SOLUTION INTRAVENOUS at 10:00

## 2020-07-18 RX ADMIN — PANTOPRAZOLE SODIUM 40 MG: 40 INJECTION, POWDER, FOR SOLUTION INTRAVENOUS at 18:28

## 2020-07-18 RX ADMIN — PIPERACILLIN AND TAZOBACTAM 3.38 G: 3; .375 INJECTION, POWDER, LYOPHILIZED, FOR SOLUTION INTRAVENOUS at 23:33

## 2020-07-18 RX ADMIN — SODIUM CHLORIDE: 9 INJECTION, SOLUTION INTRAVENOUS at 00:11

## 2020-07-18 RX ADMIN — Medication 6 MG: at 10:00

## 2020-07-18 RX ADMIN — Medication 10 ML: at 10:00

## 2020-07-18 RX ADMIN — PIPERACILLIN AND TAZOBACTAM 3.38 G: 3; .375 INJECTION, POWDER, LYOPHILIZED, FOR SOLUTION INTRAVENOUS at 00:11

## 2020-07-18 RX ADMIN — ACETAMINOPHEN 650 MG: 325 TABLET ORAL at 10:00

## 2020-07-18 RX ADMIN — ACETAMINOPHEN 650 MG: 325 TABLET ORAL at 05:32

## 2020-07-18 RX ADMIN — DILTIAZEM HYDROCHLORIDE 240 MG: 240 CAPSULE, COATED, EXTENDED RELEASE ORAL at 09:30

## 2020-07-18 RX ADMIN — VANCOMYCIN HYDROCHLORIDE 750 MG: 750 INJECTION, POWDER, LYOPHILIZED, FOR SOLUTION INTRAVENOUS at 04:45

## 2020-07-18 ASSESSMENT — PAIN DESCRIPTION - PAIN TYPE: TYPE: ACUTE PAIN

## 2020-07-18 ASSESSMENT — PAIN SCALES - GENERAL
PAINLEVEL_OUTOF10: 5
PAINLEVEL_OUTOF10: 5
PAINLEVEL_OUTOF10: 0
PAINLEVEL_OUTOF10: 3
PAINLEVEL_OUTOF10: 6
PAINLEVEL_OUTOF10: 6

## 2020-07-18 ASSESSMENT — PAIN DESCRIPTION - FREQUENCY: FREQUENCY: CONTINUOUS

## 2020-07-18 ASSESSMENT — PAIN DESCRIPTION - LOCATION: LOCATION: ABDOMEN

## 2020-07-18 ASSESSMENT — PAIN DESCRIPTION - DESCRIPTORS: DESCRIPTORS: DISCOMFORT

## 2020-07-18 ASSESSMENT — PAIN DESCRIPTION - ONSET: ONSET: ON-GOING

## 2020-07-18 ASSESSMENT — PAIN DESCRIPTION - ORIENTATION: ORIENTATION: MID
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
## 2020-07-18 NOTE — PROGRESS NOTES
General Surgery  Daily Progress Note    Pt Name: Felix De Leon  Medical Record Number: [id number]  Date of Birth 1945   Today's Date: 7/18/2020    Chief Complaint   Patient presents with    Emesis     coffee ground emesis started tonight    Abdominal Pain     started tonight    Buttocks Pain     x3 days    Concern For COVID-19     tested positive on 7/1/2020       ASSESSMENT/PLAN  1. Small bowel obstruction - feeling better. Awaiting better return of GI function      2. Hematemesis - GI on board. Hgb stable at 8.3. If he has bleeding, will need EGD. 3. Covid + - contact plus isolation  4. Severe protein calorie malnutrition - currently on fulls. He states that he does not like any of the nutritional supplements - boost, ensure, etc  5. Sacral ulcers - continue local wound care      Sophie Bunch has slightly improved from yesterday. Pain is present but well controlled. He has no nausea and no vomiting. He has passed flatus and has not had a bowel movement. He is tolerating ice chips. Current activity is up with assistance    OBJECTIVE  VITALS:  height is 5' 7\" (1.702 m) and weight is 85 lb 5.1 oz (38.7 kg). His oral temperature is 98 °F (36.7 °C). His blood pressure is 110/50 (abnormal) and his pulse is 97. His respiration is 16 and oxygen saturation is 98%. GENERAL: alert, no distress  LUNGS: normal respiratory effort, no accessory muscle use  HEART: normal rate and regular rhythm  ABDOMEN: soft, ND, minimal RLQ tenderness, no peritonitis  EXTREMITY: no cyanosis and no clubbing  I/O last 3 completed shifts: In: 9575 [P.O.:570;  I.V.:3880]  Out: 2225 [Urine:2225]  I/O this shift:  In: 240 [P.O.:240]  Out: 450 [Urine:450]    LABS  Recent Labs     07/18/20  0530   WBC 5.4   HGB 8.3*   HCT 25.1*      *   K 4.1   CL 96*   CO2 29   BUN 9   CREATININE 0.7*   CALCIUM 8.5     CBC with Differential:    Lab Results   Component Value Date    WBC 5.4 07/18/2020    RBC 2.64 07/18/2020 HGB 8.3 07/18/2020    HCT 25.1 07/18/2020     07/18/2020    MCV 95.1 07/18/2020    MCH 31.6 07/18/2020    MCHC 33.3 07/18/2020    RDW 13.5 07/18/2020    NRBC 1 09/23/2018    SEGSPCT 86.8 04/01/2013    BANDSPCT 2 09/24/2018    LYMPHOPCT 6.8 07/18/2020    MONOPCT 7.3 07/18/2020    MYELOPCT 2 09/23/2018    EOSPCT 0.0 02/15/2012    BASOPCT 0.1 07/18/2020    MONOSABS 0.4 07/18/2020    LYMPHSABS 0.4 07/18/2020    EOSABS 0.0 07/18/2020    BASOSABS 0.0 07/18/2020    DIFFTYPE AUTOMATED DIFFERENTIAL 02/15/2012     BMP:    Lab Results   Component Value Date     07/18/2020    K 4.1 07/18/2020    CL 96 07/18/2020    CO2 29 07/18/2020    BUN 9 07/18/2020    LABALBU 3.4 07/15/2020    CREATININE 0.7 07/18/2020    CALCIUM 8.5 07/18/2020    GFRAA >60 07/18/2020    GFRAA 110 04/02/2013    LABGLOM >60 07/18/2020    GLUCOSE 200 07/18/2020     Hepatic Function Panel:    Lab Results   Component Value Date    ALKPHOS 116 07/15/2020    ALT 12 07/15/2020    AST 22 07/15/2020    PROT 7.2 07/15/2020    PROT 6.1 11/21/2011    BILITOT 0.3 07/15/2020    BILIDIR <0.2 05/01/2015    IBILI see below 05/01/2015    LABALBU 3.4 07/15/2020     Magnesium:    Lab Results   Component Value Date    MG 2.40 09/26/2018     Phosphorus:    Lab Results   Component Value Date    PHOS 2.9 05/09/2015         Mal Kong PA-C  Electronically signed 7/18/2020 at 11:06 AM      Surgery Staff  I have examined this patient and read and agree with the note by Lolita Vale PA-C from today. Passing flatus with small BM.   Tolerating liquids    Vitals:    07/17/20 2355 07/18/20 0400 07/18/20 0945 07/18/20 1025   BP: 122/64 118/60 (!) 110/50    Pulse: 73 58 97    Resp: 17 16 16 16   Temp: 97.5 °F (36.4 °C) 97.4 °F (36.3 °C) 98 °F (36.7 °C)    TempSrc: Oral Oral Oral    SpO2: 96% 95% 99% 98%   Weight:       Height:       NAD, alert oriented  Tender LLQ, nondistended    Partial SBO - mild improvement  Slowly advance diet as tolerated  Add daily miralax  Continue conservative care in lieu of +covid.     Electronically signed by Paula Peralta MD on 7/18/2020 at 12:05 PM

## 2020-07-19 LAB
ANION GAP SERPL CALCULATED.3IONS-SCNC: 5 MMOL/L (ref 3–16)
BASOPHILS ABSOLUTE: 0 K/UL (ref 0–0.2)
BASOPHILS RELATIVE PERCENT: 0.3 %
BLOOD BANK DISPENSE STATUS: NORMAL
BLOOD BANK DISPENSE STATUS: NORMAL
BLOOD BANK PRODUCT CODE: NORMAL
BLOOD BANK PRODUCT CODE: NORMAL
BPU ID: NORMAL
BPU ID: NORMAL
BUN BLDV-MCNC: 9 MG/DL (ref 7–20)
CALCIUM SERPL-MCNC: 8.4 MG/DL (ref 8.3–10.6)
CHLORIDE BLD-SCNC: 100 MMOL/L (ref 99–110)
CO2: 31 MMOL/L (ref 21–32)
CREAT SERPL-MCNC: 0.6 MG/DL (ref 0.8–1.3)
DESCRIPTION BLOOD BANK: NORMAL
DESCRIPTION BLOOD BANK: NORMAL
EOSINOPHILS ABSOLUTE: 0 K/UL (ref 0–0.6)
EOSINOPHILS RELATIVE PERCENT: 0 %
GFR AFRICAN AMERICAN: >60
GFR NON-AFRICAN AMERICAN: >60
GLUCOSE BLD-MCNC: 124 MG/DL (ref 70–99)
HCT VFR BLD CALC: 28 % (ref 40.5–52.5)
HEMOGLOBIN: 9.2 G/DL (ref 13.5–17.5)
LYMPHOCYTES ABSOLUTE: 0.4 K/UL (ref 1–5.1)
LYMPHOCYTES RELATIVE PERCENT: 6.7 %
MCH RBC QN AUTO: 31.3 PG (ref 26–34)
MCHC RBC AUTO-ENTMCNC: 33 G/DL (ref 31–36)
MCV RBC AUTO: 95 FL (ref 80–100)
MONOCYTES ABSOLUTE: 0.4 K/UL (ref 0–1.3)
MONOCYTES RELATIVE PERCENT: 6.6 %
NEUTROPHILS ABSOLUTE: 5.6 K/UL (ref 1.7–7.7)
NEUTROPHILS RELATIVE PERCENT: 86.4 %
PDW BLD-RTO: 13.6 % (ref 12.4–15.4)
PLATELET # BLD: 254 K/UL (ref 135–450)
PMV BLD AUTO: 6.9 FL (ref 5–10.5)
POTASSIUM REFLEX MAGNESIUM: 4.1 MMOL/L (ref 3.5–5.1)
RBC # BLD: 2.94 M/UL (ref 4.2–5.9)
SODIUM BLD-SCNC: 136 MMOL/L (ref 136–145)
WBC # BLD: 6.5 K/UL (ref 4–11)

## 2020-07-19 PROCEDURE — 6360000002 HC RX W HCPCS: Performed by: INTERNAL MEDICINE

## 2020-07-19 PROCEDURE — 6370000000 HC RX 637 (ALT 250 FOR IP): Performed by: INTERNAL MEDICINE

## 2020-07-19 PROCEDURE — 2060000000 HC ICU INTERMEDIATE R&B

## 2020-07-19 PROCEDURE — 2700000000 HC OXYGEN THERAPY PER DAY

## 2020-07-19 PROCEDURE — 2580000003 HC RX 258: Performed by: INTERNAL MEDICINE

## 2020-07-19 PROCEDURE — 94761 N-INVAS EAR/PLS OXIMETRY MLT: CPT

## 2020-07-19 PROCEDURE — C9113 INJ PANTOPRAZOLE SODIUM, VIA: HCPCS | Performed by: INTERNAL MEDICINE

## 2020-07-19 PROCEDURE — 85025 COMPLETE CBC W/AUTO DIFF WBC: CPT

## 2020-07-19 PROCEDURE — 94640 AIRWAY INHALATION TREATMENT: CPT

## 2020-07-19 PROCEDURE — 80048 BASIC METABOLIC PNL TOTAL CA: CPT

## 2020-07-19 PROCEDURE — 99232 SBSQ HOSP IP/OBS MODERATE 35: CPT | Performed by: SURGERY

## 2020-07-19 PROCEDURE — 6370000000 HC RX 637 (ALT 250 FOR IP): Performed by: SURGERY

## 2020-07-19 RX ORDER — BUDESONIDE AND FORMOTEROL FUMARATE DIHYDRATE 160; 4.5 UG/1; UG/1
2 AEROSOL RESPIRATORY (INHALATION) 2 TIMES DAILY
Status: DISCONTINUED | OUTPATIENT
Start: 2020-07-19 | End: 2020-07-21 | Stop reason: HOSPADM

## 2020-07-19 RX ADMIN — Medication 6 MG: at 10:27

## 2020-07-19 RX ADMIN — PANTOPRAZOLE SODIUM 40 MG: 40 INJECTION, POWDER, FOR SOLUTION INTRAVENOUS at 10:27

## 2020-07-19 RX ADMIN — TIOTROPIUM BROMIDE INHALATION SPRAY 2 PUFF: 3.12 SPRAY, METERED RESPIRATORY (INHALATION) at 10:36

## 2020-07-19 RX ADMIN — BUDESONIDE AND FORMOTEROL FUMARATE DIHYDRATE 2 PUFF: 160; 4.5 AEROSOL RESPIRATORY (INHALATION) at 20:35

## 2020-07-19 RX ADMIN — ARFORMOTEROL TARTRATE 15 MCG: 15 SOLUTION RESPIRATORY (INHALATION) at 10:36

## 2020-07-19 RX ADMIN — Medication 10 ML: at 10:29

## 2020-07-19 RX ADMIN — Medication 10 ML: at 10:28

## 2020-07-19 RX ADMIN — PIPERACILLIN AND TAZOBACTAM 3.38 G: 3; .375 INJECTION, POWDER, LYOPHILIZED, FOR SOLUTION INTRAVENOUS at 10:27

## 2020-07-19 RX ADMIN — VANCOMYCIN HYDROCHLORIDE 750 MG: 750 INJECTION, POWDER, LYOPHILIZED, FOR SOLUTION INTRAVENOUS at 14:59

## 2020-07-19 RX ADMIN — PIPERACILLIN AND TAZOBACTAM 3.38 G: 3; .375 INJECTION, POWDER, LYOPHILIZED, FOR SOLUTION INTRAVENOUS at 17:14

## 2020-07-19 RX ADMIN — PANTOPRAZOLE SODIUM 40 MG: 40 INJECTION, POWDER, FOR SOLUTION INTRAVENOUS at 21:03

## 2020-07-19 RX ADMIN — DILTIAZEM HYDROCHLORIDE 240 MG: 240 CAPSULE, COATED, EXTENDED RELEASE ORAL at 10:27

## 2020-07-19 RX ADMIN — POLYETHYLENE GLYCOL 3350 17 G: 17 POWDER, FOR SOLUTION ORAL at 10:27

## 2020-07-19 RX ADMIN — ACETAMINOPHEN 650 MG: 325 TABLET ORAL at 01:02

## 2020-07-19 ASSESSMENT — PAIN SCALES - GENERAL
PAINLEVEL_OUTOF10: 0
PAINLEVEL_OUTOF10: 4

## 2020-07-19 NOTE — CONSULTS
Clinical Pharmacy Note  Vancomycin Consult    Hetal De Leon is a 76 y.o. male ordered Vancomycin for MRSA positive sputum culture; consult received from Dr. Joon Lynch to manage therapy. Also receiving Zosyn 3.375 gms every 8 hours (extended infusion). Patient Active Problem List   Diagnosis    Abdominal pain    Hypertension    COPD (chronic obstructive pulmonary disease) with emphysema (HCC)    Hypercapnic respiratory failure, chronic (HCC)    Partial small bowel obstruction (HCC)    Nausea and vomiting    Pneumonia    Constipation due to pain medication    Small bowel obstruction (HCC)    Sepsis ([facility] [address]    GI bleed       Allergies:  Patient has no known allergies. Temp max:  Temp (24hrs), Av °F (36.7 °C), Min:97.7 °F (36.5 °C), Max:98.3 °F (36.8 °C)      Recent Labs     20  0510 20  0530 20  0530   WBC 4.0 5.4 6.5       Recent Labs     20  0510 20  0530 20  0530   BUN 13 9 9   CREATININE 0.7* 0.7* 0.6*         Intake/Output Summary (Last 24 hours) at 2020 1325  Last data filed at 2020 1258  Gross per 24 hour   Intake 940 ml   Output 850 ml   Net 90 ml       Culture Results:  Positive sputum culture    Ht Readings from Last 1 Encounters:   20 5' 7\" (1.702 m)        Wt Readings from Last 1 Encounters:   20 84 lb 7 oz (38.3 kg)         Assessment/Plan:  Vancomycin 750 mg IV every 24 hours ordered. CrCl (c) = 57.9 ml/min. Goal trough level of 15-20 mg/L. Vancomycin T on 20 at 1300. Thank you for the consult.      Estella Sheffield RPh, PRS 2020  1:31 PM
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
## 2020-07-19 NOTE — PLAN OF CARE
Problem: Pain:  Goal: Pain level will decrease  Description: Pain level will decrease  Outcome: Ongoing     Problem: Falls - Risk of:  Goal: Will remain free from falls  Description: Will remain free from falls  Outcome: Ongoing     Problem: Skin Integrity:  Goal: Will show no infection signs and symptoms  Description: Will show no infection signs and symptoms  Outcome: Ongoing     Problem: PAIN  Goal: Patient's pain/discomfort is manageable  Outcome: Ongoing     Problem:  Bowel/Gastric:  Goal: Control of bowel function will improve  Description: Control of bowel function will improve  Outcome: Ongoing

## 2020-07-20 LAB
ANION GAP SERPL CALCULATED.3IONS-SCNC: 6 MMOL/L (ref 3–16)
BASOPHILS ABSOLUTE: 0 K/UL (ref 0–0.2)
BASOPHILS RELATIVE PERCENT: 0.3 %
BUN BLDV-MCNC: 9 MG/DL (ref 7–20)
CALCIUM SERPL-MCNC: 8.2 MG/DL (ref 8.3–10.6)
CHLORIDE BLD-SCNC: 99 MMOL/L (ref 99–110)
CO2: 30 MMOL/L (ref 21–32)
CREAT SERPL-MCNC: 0.6 MG/DL (ref 0.8–1.3)
CULTURE, RESPIRATORY: ABNORMAL
CULTURE, RESPIRATORY: ABNORMAL
EOSINOPHILS ABSOLUTE: 0 K/UL (ref 0–0.6)
EOSINOPHILS RELATIVE PERCENT: 0 %
GFR AFRICAN AMERICAN: >60
GFR NON-AFRICAN AMERICAN: >60
GLUCOSE BLD-MCNC: 116 MG/DL (ref 70–99)
GRAM STAIN RESULT: ABNORMAL
HCT VFR BLD CALC: 29.6 % (ref 40.5–52.5)
HEMOGLOBIN: 9.5 G/DL (ref 13.5–17.5)
LYMPHOCYTES ABSOLUTE: 0.6 K/UL (ref 1–5.1)
LYMPHOCYTES RELATIVE PERCENT: 8.8 %
MCH RBC QN AUTO: 30.5 PG (ref 26–34)
MCHC RBC AUTO-ENTMCNC: 32.1 G/DL (ref 31–36)
MCV RBC AUTO: 95.2 FL (ref 80–100)
MONOCYTES ABSOLUTE: 0.6 K/UL (ref 0–1.3)
MONOCYTES RELATIVE PERCENT: 9.1 %
NEUTROPHILS ABSOLUTE: 5.3 K/UL (ref 1.7–7.7)
NEUTROPHILS RELATIVE PERCENT: 81.8 %
ORGANISM: ABNORMAL
PDW BLD-RTO: 13.6 % (ref 12.4–15.4)
PLATELET # BLD: 262 K/UL (ref 135–450)
PMV BLD AUTO: 6.9 FL (ref 5–10.5)
POTASSIUM REFLEX MAGNESIUM: 4.1 MMOL/L (ref 3.5–5.1)
RBC # BLD: 3.11 M/UL (ref 4.2–5.9)
SODIUM BLD-SCNC: 135 MMOL/L (ref 136–145)
WBC # BLD: 6.5 K/UL (ref 4–11)

## 2020-07-20 PROCEDURE — 85025 COMPLETE CBC W/AUTO DIFF WBC: CPT

## 2020-07-20 PROCEDURE — C9113 INJ PANTOPRAZOLE SODIUM, VIA: HCPCS | Performed by: INTERNAL MEDICINE

## 2020-07-20 PROCEDURE — 80048 BASIC METABOLIC PNL TOTAL CA: CPT

## 2020-07-20 PROCEDURE — 2580000003 HC RX 258: Performed by: INTERNAL MEDICINE

## 2020-07-20 PROCEDURE — 94761 N-INVAS EAR/PLS OXIMETRY MLT: CPT

## 2020-07-20 PROCEDURE — 6360000002 HC RX W HCPCS: Performed by: INTERNAL MEDICINE

## 2020-07-20 PROCEDURE — 6370000000 HC RX 637 (ALT 250 FOR IP): Performed by: INTERNAL MEDICINE

## 2020-07-20 PROCEDURE — 2060000000 HC ICU INTERMEDIATE R&B

## 2020-07-20 PROCEDURE — 94640 AIRWAY INHALATION TREATMENT: CPT

## 2020-07-20 PROCEDURE — APPNB30 APP NON BILLABLE TIME 0-30 MINS: Performed by: PHYSICIAN ASSISTANT

## 2020-07-20 PROCEDURE — 6370000000 HC RX 637 (ALT 250 FOR IP): Performed by: SURGERY

## 2020-07-20 PROCEDURE — APPSS15 APP SPLIT SHARED TIME 0-15 MINUTES: Performed by: PHYSICIAN ASSISTANT

## 2020-07-20 PROCEDURE — 2700000000 HC OXYGEN THERAPY PER DAY

## 2020-07-20 RX ORDER — OXYCODONE HYDROCHLORIDE AND ACETAMINOPHEN 5; 325 MG/1; MG/1
1 TABLET ORAL EVERY 8 HOURS PRN
Status: DISCONTINUED | OUTPATIENT
Start: 2020-07-20 | End: 2020-07-21 | Stop reason: HOSPADM

## 2020-07-20 RX ADMIN — OXYCODONE HYDROCHLORIDE AND ACETAMINOPHEN 1 TABLET: 5; 325 TABLET ORAL at 14:16

## 2020-07-20 RX ADMIN — ANORECTAL OINTMENT: 15.7; .44; 24; 20.6 OINTMENT TOPICAL at 13:53

## 2020-07-20 RX ADMIN — Medication 10 ML: at 08:43

## 2020-07-20 RX ADMIN — PIPERACILLIN AND TAZOBACTAM 3.38 G: 3; .375 INJECTION, POWDER, LYOPHILIZED, FOR SOLUTION INTRAVENOUS at 00:00

## 2020-07-20 RX ADMIN — ACETAMINOPHEN 650 MG: 325 TABLET ORAL at 13:53

## 2020-07-20 RX ADMIN — DILTIAZEM HYDROCHLORIDE 240 MG: 240 CAPSULE, COATED, EXTENDED RELEASE ORAL at 08:42

## 2020-07-20 RX ADMIN — ACETAMINOPHEN 650 MG: 325 TABLET ORAL at 05:09

## 2020-07-20 RX ADMIN — SODIUM CHLORIDE, PRESERVATIVE FREE 10 ML: 5 INJECTION INTRAVENOUS at 08:44

## 2020-07-20 RX ADMIN — SODIUM CHLORIDE, PRESERVATIVE FREE 10 ML: 5 INJECTION INTRAVENOUS at 22:00

## 2020-07-20 RX ADMIN — PIPERACILLIN AND TAZOBACTAM 3.38 G: 3; .375 INJECTION, POWDER, LYOPHILIZED, FOR SOLUTION INTRAVENOUS at 08:42

## 2020-07-20 RX ADMIN — TIOTROPIUM BROMIDE INHALATION SPRAY 2 PUFF: 3.12 SPRAY, METERED RESPIRATORY (INHALATION) at 10:03

## 2020-07-20 RX ADMIN — PANTOPRAZOLE SODIUM 40 MG: 40 INJECTION, POWDER, FOR SOLUTION INTRAVENOUS at 08:42

## 2020-07-20 RX ADMIN — VANCOMYCIN HYDROCHLORIDE 750 MG: 750 INJECTION, POWDER, LYOPHILIZED, FOR SOLUTION INTRAVENOUS at 13:42

## 2020-07-20 RX ADMIN — BUDESONIDE AND FORMOTEROL FUMARATE DIHYDRATE 2 PUFF: 160; 4.5 AEROSOL RESPIRATORY (INHALATION) at 21:37

## 2020-07-20 RX ADMIN — Medication 10 ML: at 21:58

## 2020-07-20 RX ADMIN — POLYETHYLENE GLYCOL 3350 17 G: 17 POWDER, FOR SOLUTION ORAL at 09:00

## 2020-07-20 RX ADMIN — Medication 6 MG: at 08:42

## 2020-07-20 RX ADMIN — PANTOPRAZOLE SODIUM 40 MG: 40 INJECTION, POWDER, FOR SOLUTION INTRAVENOUS at 21:58

## 2020-07-20 RX ADMIN — BUDESONIDE AND FORMOTEROL FUMARATE DIHYDRATE 2 PUFF: 160; 4.5 AEROSOL RESPIRATORY (INHALATION) at 10:02

## 2020-07-20 RX ADMIN — PIPERACILLIN AND TAZOBACTAM 3.38 G: 3; .375 INJECTION, POWDER, LYOPHILIZED, FOR SOLUTION INTRAVENOUS at 17:41

## 2020-07-20 ASSESSMENT — PAIN DESCRIPTION - PAIN TYPE
TYPE: CHRONIC PAIN
TYPE: ACUTE PAIN
TYPE: ACUTE PAIN

## 2020-07-20 ASSESSMENT — PAIN DESCRIPTION - FREQUENCY
FREQUENCY: CONTINUOUS
FREQUENCY: INTERMITTENT

## 2020-07-20 ASSESSMENT — PAIN SCALES - GENERAL
PAINLEVEL_OUTOF10: 0
PAINLEVEL_OUTOF10: 4
PAINLEVEL_OUTOF10: 3
PAINLEVEL_OUTOF10: 2
PAINLEVEL_OUTOF10: 8
PAINLEVEL_OUTOF10: 6

## 2020-07-20 ASSESSMENT — PAIN DESCRIPTION - LOCATION
LOCATION: BUTTOCKS
LOCATION: BUTTOCKS;OTHER (COMMENT)
LOCATION: ABDOMEN;BUTTOCKS

## 2020-07-20 ASSESSMENT — PAIN - FUNCTIONAL ASSESSMENT
PAIN_FUNCTIONAL_ASSESSMENT: PREVENTS OR INTERFERES SOME ACTIVE ACTIVITIES AND ADLS

## 2020-07-20 ASSESSMENT — PAIN DESCRIPTION - PROGRESSION: CLINICAL_PROGRESSION: NOT CHANGED

## 2020-07-20 ASSESSMENT — PAIN DESCRIPTION - DESCRIPTORS
DESCRIPTORS: SORE

## 2020-07-20 ASSESSMENT — PAIN DESCRIPTION - ORIENTATION: ORIENTATION: MID

## 2020-07-20 ASSESSMENT — PAIN DESCRIPTION - ONSET: ONSET: ON-GOING

## 2020-07-20 NOTE — PROGRESS NOTES
General Surgery  Daily Progress Note    Pt Name: Camilla De Leon  Medical Record Number: [id number]  Date of Birth 1945   Today's Date: 7/20/2020    Chief Complaint   Patient presents with    Emesis     coffee ground emesis started tonight    Abdominal Pain     started tonight    Buttocks Pain     x3 days    Concern For COVID-19     tested positive on 7/1/2020       ASSESSMENT/PLAN  1. Small bowel obstruction - resolving. Had multiple dark BMs. Abdomen nontender. Tolerating regular diet  2. Hematemesis - GI on board. H/H stable. Dark stools likely old blood  3. Covid + - contact plus isolation  4. Severe protein calorie malnutrition - on general diet now. He states that he does not like any of the nutritional supplements - boost, ensure, etc  5. Sacral ulcers - continue local wound care  6. No general surgery needs at this time. Will follow peripherally       Kimani Muse has slightly improved from yesterday. Pain is resolved. He has no nausea and no vomiting. He has passed flatus and has had a bowel movement. He is tolerating liquids. Current activity is up with assistance    OBJECTIVE  VITALS:  height is 5' 7\" (1.702 m) and weight is 86 lb 3.2 oz (39.1 kg). His axillary temperature is 99.1 °F (37.3 °C). His blood pressure is 142/60 (abnormal) and his pulse is 99. His respiration is 18 and oxygen saturation is 100%. GENERAL: alert, no distress  LUNGS: normal respiratory effort, no accessory muscle use  HEART: normal rate and regular rhythm  ABDOMEN: soft, ND, nontender  EXTREMITY: no cyanosis and no clubbing  I/O last 3 completed shifts: In: 820 [P.O.:820]  Out: 1200 [Urine:1200]  I/O this shift:   In: 720 [P.O.:720]  Out: 200 [Urine:200]    LABS  Recent Labs     07/20/20  0500   WBC 6.5   HGB 9.5*   HCT 29.6*      *   K 4.1   CL 99   CO2 30   BUN 9   CREATININE 0.6*   CALCIUM 8.2*     CBC with Differential:    Lab Results   Component Value Date    WBC 6.5 07/20/2020    RBC 3.11 07/20/2020    HGB 9.5 07/20/2020    HCT 29.6 07/20/2020     07/20/2020    MCV 95.2 07/20/2020    MCH 30.5 07/20/2020    MCHC 32.1 07/20/2020    RDW 13.6 07/20/2020    NRBC 1 09/23/2018    SEGSPCT 86.8 04/01/2013    BANDSPCT 2 09/24/2018    LYMPHOPCT 8.8 07/20/2020    MONOPCT 9.1 07/20/2020    MYELOPCT 2 09/23/2018    EOSPCT 0.0 02/15/2012    BASOPCT 0.3 07/20/2020    MONOSABS 0.6 07/20/2020    LYMPHSABS 0.6 07/20/2020    EOSABS 0.0 07/20/2020    BASOSABS 0.0 07/20/2020    DIFFTYPE AUTOMATED DIFFERENTIAL 02/15/2012     BMP:    Lab Results   Component Value Date     07/20/2020    K 4.1 07/20/2020    CL 99 07/20/2020    CO2 30 07/20/2020    BUN 9 07/20/2020    LABALBU 3.4 07/15/2020    CREATININE 0.6 07/20/2020    CALCIUM 8.2 07/20/2020    GFRAA >60 07/20/2020    GFRAA 110 04/02/2013    LABGLOM >60 07/20/2020    GLUCOSE 116 07/20/2020     Hepatic Function Panel:    Lab Results   Component Value Date    ALKPHOS 116 07/15/2020    ALT 12 07/15/2020    AST 22 07/15/2020    PROT 7.2 07/15/2020    PROT 6.1 11/21/2011    BILITOT 0.3 07/15/2020    BILIDIR <0.2 05/01/2015    IBILI see below 05/01/2015    LABALBU 3.4 07/15/2020     Magnesium:    Lab Results   Component Value Date    MG 2.40 09/26/2018     Phosphorus:    Lab Results   Component Value Date    PHOS 2.9 05/09/2015         Electronically signed by Mariella Hollis PA-C on 7/20/2020 at 12:36 PM
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
## 2020-07-21 VITALS
OXYGEN SATURATION: 98 % | DIASTOLIC BLOOD PRESSURE: 58 MMHG | HEART RATE: 81 BPM | WEIGHT: 88.18 LBS | HEIGHT: 67 IN | SYSTOLIC BLOOD PRESSURE: 118 MMHG | TEMPERATURE: 98.5 F | RESPIRATION RATE: 18 BRPM | BODY MASS INDEX: 13.84 KG/M2

## 2020-07-21 LAB
ANION GAP SERPL CALCULATED.3IONS-SCNC: 6 MMOL/L (ref 3–16)
BASOPHILS ABSOLUTE: 0 K/UL (ref 0–0.2)
BASOPHILS RELATIVE PERCENT: 0.1 %
BUN BLDV-MCNC: 10 MG/DL (ref 7–20)
CALCIUM SERPL-MCNC: 8.4 MG/DL (ref 8.3–10.6)
CHLORIDE BLD-SCNC: 97 MMOL/L (ref 99–110)
CO2: 33 MMOL/L (ref 21–32)
CREAT SERPL-MCNC: 0.7 MG/DL (ref 0.8–1.3)
EOSINOPHILS ABSOLUTE: 0 K/UL (ref 0–0.6)
EOSINOPHILS RELATIVE PERCENT: 0 %
GFR AFRICAN AMERICAN: >60
GFR NON-AFRICAN AMERICAN: >60
GLUCOSE BLD-MCNC: 103 MG/DL (ref 70–99)
HCT VFR BLD CALC: 28.2 % (ref 40.5–52.5)
HEMOGLOBIN: 9.2 G/DL (ref 13.5–17.5)
LYMPHOCYTES ABSOLUTE: 0.6 K/UL (ref 1–5.1)
LYMPHOCYTES RELATIVE PERCENT: 8.4 %
MCH RBC QN AUTO: 31 PG (ref 26–34)
MCHC RBC AUTO-ENTMCNC: 32.7 G/DL (ref 31–36)
MCV RBC AUTO: 94.8 FL (ref 80–100)
MONOCYTES ABSOLUTE: 0.8 K/UL (ref 0–1.3)
MONOCYTES RELATIVE PERCENT: 10.9 %
NEUTROPHILS ABSOLUTE: 5.6 K/UL (ref 1.7–7.7)
NEUTROPHILS RELATIVE PERCENT: 80.6 %
PDW BLD-RTO: 13.5 % (ref 12.4–15.4)
PLATELET # BLD: 233 K/UL (ref 135–450)
PMV BLD AUTO: 7.1 FL (ref 5–10.5)
POTASSIUM REFLEX MAGNESIUM: 4.3 MMOL/L (ref 3.5–5.1)
RBC # BLD: 2.97 M/UL (ref 4.2–5.9)
SODIUM BLD-SCNC: 136 MMOL/L (ref 136–145)
VANCOMYCIN TROUGH: 9.9 UG/ML (ref 10–20)
WBC # BLD: 7 K/UL (ref 4–11)

## 2020-07-21 PROCEDURE — C9113 INJ PANTOPRAZOLE SODIUM, VIA: HCPCS | Performed by: INTERNAL MEDICINE

## 2020-07-21 PROCEDURE — 2580000003 HC RX 258: Performed by: INTERNAL MEDICINE

## 2020-07-21 PROCEDURE — 85025 COMPLETE CBC W/AUTO DIFF WBC: CPT

## 2020-07-21 PROCEDURE — 6360000002 HC RX W HCPCS: Performed by: INTERNAL MEDICINE

## 2020-07-21 PROCEDURE — 2700000000 HC OXYGEN THERAPY PER DAY

## 2020-07-21 PROCEDURE — 80202 ASSAY OF VANCOMYCIN: CPT

## 2020-07-21 PROCEDURE — 94761 N-INVAS EAR/PLS OXIMETRY MLT: CPT

## 2020-07-21 PROCEDURE — 80048 BASIC METABOLIC PNL TOTAL CA: CPT

## 2020-07-21 PROCEDURE — 94640 AIRWAY INHALATION TREATMENT: CPT

## 2020-07-21 PROCEDURE — 6370000000 HC RX 637 (ALT 250 FOR IP): Performed by: SURGERY

## 2020-07-21 PROCEDURE — 6370000000 HC RX 637 (ALT 250 FOR IP): Performed by: INTERNAL MEDICINE

## 2020-07-21 RX ORDER — OXYCODONE HYDROCHLORIDE AND ACETAMINOPHEN 5; 325 MG/1; MG/1
1 TABLET ORAL EVERY 6 HOURS PRN
Qty: 9 TABLET | Refills: 0 | Status: SHIPPED | OUTPATIENT
Start: 2020-07-21 | End: 2020-08-20

## 2020-07-21 RX ADMIN — TIOTROPIUM BROMIDE INHALATION SPRAY 2 PUFF: 3.12 SPRAY, METERED RESPIRATORY (INHALATION) at 10:11

## 2020-07-21 RX ADMIN — Medication 10 ML: at 09:18

## 2020-07-21 RX ADMIN — DILTIAZEM HYDROCHLORIDE 240 MG: 240 CAPSULE, COATED, EXTENDED RELEASE ORAL at 08:34

## 2020-07-21 RX ADMIN — VANCOMYCIN HYDROCHLORIDE 1000 MG: 1 INJECTION, POWDER, LYOPHILIZED, FOR SOLUTION INTRAVENOUS at 10:32

## 2020-07-21 RX ADMIN — PIPERACILLIN AND TAZOBACTAM 3.38 G: 3; .375 INJECTION, POWDER, LYOPHILIZED, FOR SOLUTION INTRAVENOUS at 08:34

## 2020-07-21 RX ADMIN — PANTOPRAZOLE SODIUM 40 MG: 40 INJECTION, POWDER, FOR SOLUTION INTRAVENOUS at 08:34

## 2020-07-21 RX ADMIN — PIPERACILLIN AND TAZOBACTAM 3.38 G: 3; .375 INJECTION, POWDER, LYOPHILIZED, FOR SOLUTION INTRAVENOUS at 00:22

## 2020-07-21 RX ADMIN — POLYETHYLENE GLYCOL 3350 17 G: 17 POWDER, FOR SOLUTION ORAL at 08:34

## 2020-07-21 RX ADMIN — BUDESONIDE AND FORMOTEROL FUMARATE DIHYDRATE 2 PUFF: 160; 4.5 AEROSOL RESPIRATORY (INHALATION) at 10:10

## 2020-07-21 RX ADMIN — Medication 6 MG: at 08:34

## 2020-07-21 RX ADMIN — SODIUM CHLORIDE, PRESERVATIVE FREE 10 ML: 5 INJECTION INTRAVENOUS at 08:35

## 2020-07-21 RX ADMIN — ANORECTAL OINTMENT 1 EACH: 15.7; .44; 24; 20.6 OINTMENT TOPICAL at 02:27

## 2020-07-21 ASSESSMENT — PAIN SCALES - GENERAL
PAINLEVEL_OUTOF10: 0

## 2020-07-21 NOTE — PROGRESS NOTES
Tried to call rapport twice to [facility], no answer.  Will try again, patient due to be leaving with transport at 1130

## 2020-07-21 NOTE — CONSULTS
Clinical Pharmacy Note  Vancomycin Consult    Caprice De Leon is a 76 y.o. male ordered Vancomycin for MRSA positive sputum culture; consult received from Dr. Nolberto Tobias to manage therapy. Also receiving Zosyn 3.375 gms every 8 hours (extended infusion). Patient Active Problem List   Diagnosis    Abdominal pain    Hypertension    COPD (chronic obstructive pulmonary disease) with emphysema (HCC)    Hypercapnic respiratory failure, chronic (HCC)    Partial small bowel obstruction (HCC)    Nausea and vomiting    Pneumonia    Constipation due to pain medication    Small bowel obstruction (HCC)    Sepsis ([address]    GI bleed       Allergies:  Patient has no known allergies. Temp max:  Temp (24hrs), Av.5 °F (36.9 °C), Min:98.3 °F (36.8 °C), Max:98.6 °F (37 °C)      Recent Labs     20  0530 20  0500 20  0550   WBC 6.5 6.5 7.0       Recent Labs     20  0530 20  0500 20  0550   BUN 9 9 10   CREATININE 0.6* 0.6* 0.7*         Intake/Output Summary (Last 24 hours) at 2020 7647  Last data filed at 2020 0636  Gross per 24 hour   Intake 3289.2 ml   Output 1775 ml   Net 1514.2 ml       Culture Results:  Positive sputum culture    Ht Readings from Last 1 Encounters:   20 5' 7\" (1.702 m)        Wt Readings from Last 1 Encounters:   20 88 lb 2.9 oz (40 kg)         Assessment/Plan:  vanco day 6  Trough 9.9 this am post 17hr 750mg dose  Will increase Vancomycin 1000 mg IV every 18 hours . Goal trough level of 15-20 mg/L. Vancomycin T on 20 at 0300. Thank you for the consult.      Electronically signed by Missy Wolfe [facility] on 2020 at 9:24 AM
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
## 2020-07-21 NOTE — PLAN OF CARE
Pt c/o pain 3/10. He says it gets worse with movement, especially turning. RN offered pain medication but pt refused. He said, \"I don't like the way that stuff makes me feel. \" Reminder written on white board of when pt can receive pain medication if desired. Later in shift, pt denied pain. Pt is a high fall risk. Bed in locked, low position with side rails up X 3 and an active bed alarm. Fall risk bracelet placed. Fall sign in place. Pt has compromised skin integrity. He refuses to turn d/t pain despite RN offering pain medication. RN explained risks of immobility and laying in the same spot. Pt said he wasn't laying on his butt and \"didn't see the point\" in turning if he was off the part where his sores were. RN explained that new sores could develop on his R hip if he doesn't relieve pressure from it. Pt made aware that tissue damage can occur after 2 hours of continued pressure to the skin under a bony prominence. Pt VU but continued to refuse turns t/o shift. Pt assisted with denture care. Pt offered hand  after each use of the urinal while RN at bedside. Pt voids per urinal. No episodes of fecal incontinence thus far in the shift.
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
## 2021-04-21 ENCOUNTER — APPOINTMENT (OUTPATIENT)
Dept: GENERAL RADIOLOGY | Age: 76
DRG: 871 | End: 2021-04-21
Payer: MEDICARE

## 2021-04-21 ENCOUNTER — APPOINTMENT (OUTPATIENT)
Dept: CT IMAGING | Age: 76
DRG: 871 | End: 2021-04-21
Payer: MEDICARE

## 2021-04-21 ENCOUNTER — HOSPITAL ENCOUNTER (INPATIENT)
Age: 76
LOS: 13 days | Discharge: NURSING, REHAB OR LONG-TERM CARE | DRG: 871 | End: 2021-05-04
Attending: EMERGENCY MEDICINE | Admitting: INTERNAL MEDICINE
Payer: MEDICARE

## 2021-04-21 DIAGNOSIS — K56.609 SBO (SMALL BOWEL OBSTRUCTION) (HCC): Primary | ICD-10-CM

## 2021-04-21 DIAGNOSIS — A41.9 SEPTICEMIA (HCC): ICD-10-CM

## 2021-04-21 DIAGNOSIS — J18.9 PNEUMONIA DUE TO ORGANISM: ICD-10-CM

## 2021-04-21 PROBLEM — J15.6 GRAM-NEGATIVE PNEUMONIA (HCC): Status: ACTIVE | Noted: 2021-04-21

## 2021-04-21 PROBLEM — E43 SEVERE MALNUTRITION (HCC): Chronic | Status: ACTIVE | Noted: 2021-04-21

## 2021-04-21 PROBLEM — I71.9 AORTIC ANEURYSM (HCC): Status: ACTIVE | Noted: 2021-04-21

## 2021-04-21 PROBLEM — G93.41 ACUTE METABOLIC ENCEPHALOPATHY: Status: ACTIVE | Noted: 2021-04-21

## 2021-04-21 LAB
ALBUMIN SERPL-MCNC: 4.3 G/DL (ref 3.4–5)
ALP BLD-CCNC: 123 U/L (ref 40–129)
ALT SERPL-CCNC: 21 U/L (ref 10–40)
ANION GAP SERPL CALCULATED.3IONS-SCNC: 9 MMOL/L (ref 3–16)
AST SERPL-CCNC: 23 U/L (ref 15–37)
BASE EXCESS ARTERIAL: 6.8 MMOL/L (ref -3–3)
BASE EXCESS VENOUS: 4.7 MMOL/L
BASE EXCESS VENOUS: 5 MMOL/L
BASE EXCESS VENOUS: 7.8 MMOL/L
BASOPHILS ABSOLUTE: 0.1 K/UL (ref 0–0.2)
BASOPHILS RELATIVE PERCENT: 0.4 %
BILIRUB SERPL-MCNC: 0.5 MG/DL (ref 0–1)
BILIRUBIN DIRECT: <0.2 MG/DL (ref 0–0.3)
BILIRUBIN URINE: NEGATIVE
BILIRUBIN, INDIRECT: NORMAL MG/DL (ref 0–1)
BLOOD, URINE: ABNORMAL
BUN BLDV-MCNC: 25 MG/DL (ref 7–20)
CALCIUM SERPL-MCNC: 9.4 MG/DL (ref 8.3–10.6)
CARBOXYHEMOGLOBIN ARTERIAL: 1.5 % (ref 0–1.5)
CARBOXYHEMOGLOBIN: 1.1 %
CARBOXYHEMOGLOBIN: 1.2 %
CARBOXYHEMOGLOBIN: 1.4 %
CHLORIDE BLD-SCNC: 94 MMOL/L (ref 99–110)
CLARITY: CLEAR
CO2: 35 MMOL/L (ref 21–32)
COLOR: YELLOW
COMMENT UA: ABNORMAL
CREAT SERPL-MCNC: 0.9 MG/DL (ref 0.8–1.3)
EOSINOPHILS ABSOLUTE: 0.1 K/UL (ref 0–0.6)
EOSINOPHILS RELATIVE PERCENT: 0.4 %
EPITHELIAL CELLS, UA: 1 /HPF (ref 0–5)
GFR AFRICAN AMERICAN: >60
GFR NON-AFRICAN AMERICAN: >60
GLUCOSE BLD-MCNC: 112 MG/DL (ref 70–99)
GLUCOSE BLD-MCNC: 145 MG/DL (ref 70–99)
GLUCOSE URINE: NEGATIVE MG/DL
HCO3 ARTERIAL: 36.6 MMOL/L (ref 21–29)
HCO3 VENOUS: 35 MMOL/L (ref 23–29)
HCO3 VENOUS: 35 MMOL/L (ref 23–29)
HCO3 VENOUS: 38 MMOL/L (ref 23–29)
HCT VFR BLD CALC: 41.3 % (ref 40.5–52.5)
HEMOGLOBIN, ART, EXTENDED: 12.3 G/DL (ref 13.5–17.5)
HEMOGLOBIN: 13.3 G/DL (ref 13.5–17.5)
HYALINE CASTS: 0 /LPF (ref 0–8)
INR BLD: 1.15 (ref 0.86–1.14)
KETONES, URINE: ABNORMAL MG/DL
LACTIC ACID: 1.3 MMOL/L (ref 0.4–2)
LEUKOCYTE ESTERASE, URINE: NEGATIVE
LIPASE: 16 U/L (ref 13–60)
LYMPHOCYTES ABSOLUTE: 1.4 K/UL (ref 1–5.1)
LYMPHOCYTES RELATIVE PERCENT: 7.8 %
MAGNESIUM: 2.6 MG/DL (ref 1.8–2.4)
MCH RBC QN AUTO: 32.7 PG (ref 26–34)
MCHC RBC AUTO-ENTMCNC: 32.1 G/DL (ref 31–36)
MCV RBC AUTO: 101.7 FL (ref 80–100)
METHEMOGLOBIN ARTERIAL: 0.4 %
METHEMOGLOBIN VENOUS: 0.4 %
METHEMOGLOBIN VENOUS: 0.5 %
METHEMOGLOBIN VENOUS: 0.8 %
MICROSCOPIC EXAMINATION: YES
MONOCYTES ABSOLUTE: 1.5 K/UL (ref 0–1.3)
MONOCYTES RELATIVE PERCENT: 8.4 %
NEUTROPHILS ABSOLUTE: 15 K/UL (ref 1.7–7.7)
NEUTROPHILS RELATIVE PERCENT: 83 %
NITRITE, URINE: NEGATIVE
O2 CONTENT ARTERIAL: 16 ML/DL
O2 CONTENT, VEN: 10 ML/DL
O2 CONTENT, VEN: 11 ML/DL
O2 CONTENT, VEN: 12 ML/DL
O2 SAT, ARTERIAL: 93 %
O2 SAT, VEN: 62 %
O2 SAT, VEN: 65 %
O2 SAT, VEN: 78 %
O2 THERAPY: ABNORMAL
PCO2 ARTERIAL: 82.7 MMHG (ref 35–45)
PCO2, VEN: 82.5 MMHG (ref 40–50)
PCO2, VEN: 85.5 MMHG (ref 40–50)
PCO2, VEN: 90.8 MMHG (ref 40–50)
PDW BLD-RTO: 13.6 % (ref 12.4–15.4)
PERFORMED ON: ABNORMAL
PH ARTERIAL: 7.25 (ref 7.35–7.45)
PH UA: 5.5 (ref 5–8)
PH VENOUS: 7.22 (ref 7.35–7.45)
PH VENOUS: 7.23 (ref 7.35–7.45)
PH VENOUS: 7.23 (ref 7.35–7.45)
PHOSPHORUS: 4.1 MG/DL (ref 2.5–4.9)
PLATELET # BLD: 240 K/UL (ref 135–450)
PMV BLD AUTO: 7.8 FL (ref 5–10.5)
PO2 ARTERIAL: 69.8 MMHG (ref 75–108)
PO2, VEN: 35 MMHG
PO2, VEN: 37 MMHG
PO2, VEN: 46 MMHG
POTASSIUM REFLEX MAGNESIUM: 4.6 MMOL/L (ref 3.5–5.1)
PRO-BNP: 115 PG/ML (ref 0–449)
PROTEIN UA: 30 MG/DL
PROTHROMBIN TIME: 13.4 SEC (ref 10–13.2)
RAPID INFLUENZA  B AGN: NEGATIVE
RAPID INFLUENZA A AGN: NEGATIVE
RBC # BLD: 4.06 M/UL (ref 4.2–5.9)
RBC UA: 2 /HPF (ref 0–4)
SARS-COV-2, NAAT: NOT DETECTED
SODIUM BLD-SCNC: 138 MMOL/L (ref 136–145)
SPECIFIC GRAVITY UA: >1.03 (ref 1–1.03)
TCO2 ARTERIAL: 39.2 MMOL/L
TCO2 CALC VENOUS: 37 MMOL/L
TCO2 CALC VENOUS: 38 MMOL/L
TCO2 CALC VENOUS: 41 MMOL/L
TOTAL PROTEIN: 7.3 G/DL (ref 6.4–8.2)
URINE REFLEX TO CULTURE: ABNORMAL
URINE TYPE: ABNORMAL
UROBILINOGEN, URINE: 1 E.U./DL
WBC # BLD: 18 K/UL (ref 4–11)
WBC UA: 7 /HPF (ref 0–5)

## 2021-04-21 PROCEDURE — 83605 ASSAY OF LACTIC ACID: CPT

## 2021-04-21 PROCEDURE — 85610 PROTHROMBIN TIME: CPT

## 2021-04-21 PROCEDURE — 6360000002 HC RX W HCPCS: Performed by: EMERGENCY MEDICINE

## 2021-04-21 PROCEDURE — 87635 SARS-COV-2 COVID-19 AMP PRB: CPT

## 2021-04-21 PROCEDURE — 94640 AIRWAY INHALATION TREATMENT: CPT

## 2021-04-21 PROCEDURE — 96365 THER/PROPH/DIAG IV INF INIT: CPT

## 2021-04-21 PROCEDURE — 81001 URINALYSIS AUTO W/SCOPE: CPT

## 2021-04-21 PROCEDURE — 87040 BLOOD CULTURE FOR BACTERIA: CPT

## 2021-04-21 PROCEDURE — 2500000003 HC RX 250 WO HCPCS: Performed by: INTERNAL MEDICINE

## 2021-04-21 PROCEDURE — 2700000000 HC OXYGEN THERAPY PER DAY

## 2021-04-21 PROCEDURE — APPSS180 APP SPLIT SHARED TIME > 60 MINUTES: Performed by: NURSE PRACTITIONER

## 2021-04-21 PROCEDURE — 6360000002 HC RX W HCPCS: Performed by: NURSE PRACTITIONER

## 2021-04-21 PROCEDURE — 99291 CRITICAL CARE FIRST HOUR: CPT | Performed by: INTERNAL MEDICINE

## 2021-04-21 PROCEDURE — 2580000003 HC RX 258: Performed by: INTERNAL MEDICINE

## 2021-04-21 PROCEDURE — 51798 US URINE CAPACITY MEASURE: CPT

## 2021-04-21 PROCEDURE — 2000000000 HC ICU R&B

## 2021-04-21 PROCEDURE — APPNB15 APP NON BILLABLE TIME 0-15 MINS: Performed by: NURSE PRACTITIONER

## 2021-04-21 PROCEDURE — 36556 INSERT NON-TUNNEL CV CATH: CPT

## 2021-04-21 PROCEDURE — 87449 NOS EACH ORGANISM AG IA: CPT

## 2021-04-21 PROCEDURE — 2580000003 HC RX 258: Performed by: NURSE PRACTITIONER

## 2021-04-21 PROCEDURE — 99255 IP/OBS CONSLTJ NEW/EST HI 80: CPT | Performed by: INTERNAL MEDICINE

## 2021-04-21 PROCEDURE — 6370000000 HC RX 637 (ALT 250 FOR IP): Performed by: INTERNAL MEDICINE

## 2021-04-21 PROCEDURE — 83880 ASSAY OF NATRIURETIC PEPTIDE: CPT

## 2021-04-21 PROCEDURE — 36592 COLLECT BLOOD FROM PICC: CPT

## 2021-04-21 PROCEDURE — 96367 TX/PROPH/DG ADDL SEQ IV INF: CPT

## 2021-04-21 PROCEDURE — 6360000002 HC RX W HCPCS: Performed by: STUDENT IN AN ORGANIZED HEALTH CARE EDUCATION/TRAINING PROGRAM

## 2021-04-21 PROCEDURE — 99222 1ST HOSP IP/OBS MODERATE 55: CPT | Performed by: SURGERY

## 2021-04-21 PROCEDURE — 94660 CPAP INITIATION&MGMT: CPT

## 2021-04-21 PROCEDURE — 71250 CT THORAX DX C-: CPT

## 2021-04-21 PROCEDURE — 83690 ASSAY OF LIPASE: CPT

## 2021-04-21 PROCEDURE — 74018 RADEX ABDOMEN 1 VIEW: CPT

## 2021-04-21 PROCEDURE — 6370000000 HC RX 637 (ALT 250 FOR IP): Performed by: NURSE PRACTITIONER

## 2021-04-21 PROCEDURE — 87804 INFLUENZA ASSAY W/OPTIC: CPT

## 2021-04-21 PROCEDURE — 74174 CTA ABD&PLVS W/CONTRAST: CPT

## 2021-04-21 PROCEDURE — 99284 EMERGENCY DEPT VISIT MOD MDM: CPT

## 2021-04-21 PROCEDURE — 80076 HEPATIC FUNCTION PANEL: CPT

## 2021-04-21 PROCEDURE — 84100 ASSAY OF PHOSPHORUS: CPT

## 2021-04-21 PROCEDURE — 83735 ASSAY OF MAGNESIUM: CPT

## 2021-04-21 PROCEDURE — 6360000004 HC RX CONTRAST MEDICATION: Performed by: EMERGENCY MEDICINE

## 2021-04-21 PROCEDURE — 85025 COMPLETE CBC W/AUTO DIFF WBC: CPT

## 2021-04-21 PROCEDURE — 2580000003 HC RX 258: Performed by: EMERGENCY MEDICINE

## 2021-04-21 PROCEDURE — 71045 X-RAY EXAM CHEST 1 VIEW: CPT

## 2021-04-21 PROCEDURE — 87641 MR-STAPH DNA AMP PROBE: CPT

## 2021-04-21 PROCEDURE — 96375 TX/PRO/DX INJ NEW DRUG ADDON: CPT

## 2021-04-21 PROCEDURE — 80048 BASIC METABOLIC PNL TOTAL CA: CPT

## 2021-04-21 PROCEDURE — 36415 COLL VENOUS BLD VENIPUNCTURE: CPT

## 2021-04-21 PROCEDURE — 02HV33Z INSERTION OF INFUSION DEVICE INTO SUPERIOR VENA CAVA, PERCUTANEOUS APPROACH: ICD-10-PCS | Performed by: EMERGENCY MEDICINE

## 2021-04-21 PROCEDURE — 82803 BLOOD GASES ANY COMBINATION: CPT

## 2021-04-21 PROCEDURE — 6360000002 HC RX W HCPCS: Performed by: INTERNAL MEDICINE

## 2021-04-21 RX ORDER — ARFORMOTEROL TARTRATE 15 UG/2ML
15 SOLUTION RESPIRATORY (INHALATION) 2 TIMES DAILY
Status: DISCONTINUED | OUTPATIENT
Start: 2021-04-21 | End: 2021-04-21

## 2021-04-21 RX ORDER — POLYETHYLENE GLYCOL 3350 17 G/17G
17 POWDER, FOR SOLUTION ORAL DAILY PRN
Status: DISCONTINUED | OUTPATIENT
Start: 2021-04-21 | End: 2021-04-22

## 2021-04-21 RX ORDER — METHYLPREDNISOLONE SODIUM SUCCINATE 125 MG/2ML
60 INJECTION, POWDER, LYOPHILIZED, FOR SOLUTION INTRAMUSCULAR; INTRAVENOUS EVERY 6 HOURS
Status: DISCONTINUED | OUTPATIENT
Start: 2021-04-21 | End: 2021-04-23

## 2021-04-21 RX ORDER — IPRATROPIUM BROMIDE AND ALBUTEROL SULFATE 2.5; .5 MG/3ML; MG/3ML
1 SOLUTION RESPIRATORY (INHALATION)
Status: DISCONTINUED | OUTPATIENT
Start: 2021-04-21 | End: 2021-04-22

## 2021-04-21 RX ORDER — SODIUM CHLORIDE 0.9 % (FLUSH) 0.9 %
5-40 SYRINGE (ML) INJECTION EVERY 12 HOURS SCHEDULED
Status: DISCONTINUED | OUTPATIENT
Start: 2021-04-21 | End: 2021-04-24 | Stop reason: SDUPTHER

## 2021-04-21 RX ORDER — ALBUTEROL SULFATE 90 UG/1
2 AEROSOL, METERED RESPIRATORY (INHALATION) EVERY 6 HOURS PRN
Status: DISCONTINUED | OUTPATIENT
Start: 2021-04-21 | End: 2021-05-04 | Stop reason: HOSPADM

## 2021-04-21 RX ORDER — SODIUM CHLORIDE 0.9 % (FLUSH) 0.9 %
5-40 SYRINGE (ML) INJECTION PRN
Status: DISCONTINUED | OUTPATIENT
Start: 2021-04-21 | End: 2021-04-24 | Stop reason: SDUPTHER

## 2021-04-21 RX ORDER — AMLODIPINE BESYLATE 5 MG/1
5 TABLET ORAL DAILY
Status: DISCONTINUED | OUTPATIENT
Start: 2021-04-21 | End: 2021-04-25

## 2021-04-21 RX ORDER — PREDNISONE 1 MG/1
5 TABLET ORAL DAILY
Status: DISCONTINUED | OUTPATIENT
Start: 2021-04-21 | End: 2021-04-21

## 2021-04-21 RX ORDER — SODIUM CHLORIDE FOR INHALATION 3 %
15 VIAL, NEBULIZER (ML) INHALATION 3 TIMES DAILY
Status: DISCONTINUED | OUTPATIENT
Start: 2021-04-21 | End: 2021-05-04 | Stop reason: HOSPADM

## 2021-04-21 RX ORDER — PROMETHAZINE HYDROCHLORIDE 25 MG/1
12.5 TABLET ORAL EVERY 6 HOURS PRN
Status: DISCONTINUED | OUTPATIENT
Start: 2021-04-21 | End: 2021-05-04 | Stop reason: HOSPADM

## 2021-04-21 RX ORDER — MORPHINE SULFATE 4 MG/ML
4 INJECTION, SOLUTION INTRAMUSCULAR; INTRAVENOUS ONCE
Status: COMPLETED | OUTPATIENT
Start: 2021-04-21 | End: 2021-04-21

## 2021-04-21 RX ORDER — SODIUM CHLORIDE 9 MG/ML
25 INJECTION, SOLUTION INTRAVENOUS PRN
Status: DISCONTINUED | OUTPATIENT
Start: 2021-04-21 | End: 2021-04-24 | Stop reason: SDUPTHER

## 2021-04-21 RX ORDER — ACETAMINOPHEN 650 MG/1
650 SUPPOSITORY RECTAL EVERY 6 HOURS PRN
Status: DISCONTINUED | OUTPATIENT
Start: 2021-04-21 | End: 2021-04-22

## 2021-04-21 RX ORDER — OMEPRAZOLE 20 MG/1
40 CAPSULE, DELAYED RELEASE ORAL DAILY
Status: DISCONTINUED | OUTPATIENT
Start: 2021-04-21 | End: 2021-04-22

## 2021-04-21 RX ORDER — SODIUM CHLORIDE 9 MG/ML
INJECTION, SOLUTION INTRAVENOUS CONTINUOUS
Status: DISCONTINUED | OUTPATIENT
Start: 2021-04-21 | End: 2021-04-22

## 2021-04-21 RX ORDER — GABAPENTIN 300 MG/1
300 CAPSULE ORAL 3 TIMES DAILY
COMMUNITY

## 2021-04-21 RX ORDER — ONDANSETRON 2 MG/ML
4 INJECTION INTRAMUSCULAR; INTRAVENOUS EVERY 6 HOURS PRN
Status: DISCONTINUED | OUTPATIENT
Start: 2021-04-21 | End: 2021-05-04 | Stop reason: HOSPADM

## 2021-04-21 RX ORDER — ALPRAZOLAM 0.25 MG/1
0.25 TABLET ORAL NIGHTLY PRN
COMMUNITY

## 2021-04-21 RX ORDER — BUDESONIDE 0.25 MG/2ML
250 INHALANT ORAL 2 TIMES DAILY
Status: DISCONTINUED | OUTPATIENT
Start: 2021-04-21 | End: 2021-05-04 | Stop reason: HOSPADM

## 2021-04-21 RX ORDER — ONDANSETRON 2 MG/ML
4 INJECTION INTRAMUSCULAR; INTRAVENOUS ONCE
Status: COMPLETED | OUTPATIENT
Start: 2021-04-21 | End: 2021-04-21

## 2021-04-21 RX ORDER — ACETAMINOPHEN 325 MG/1
650 TABLET ORAL EVERY 6 HOURS PRN
Status: DISCONTINUED | OUTPATIENT
Start: 2021-04-21 | End: 2021-04-22

## 2021-04-21 RX ORDER — ARFORMOTEROL TARTRATE 15 UG/2ML
15 SOLUTION RESPIRATORY (INHALATION) 2 TIMES DAILY
Status: DISCONTINUED | OUTPATIENT
Start: 2021-04-21 | End: 2021-05-04 | Stop reason: HOSPADM

## 2021-04-21 RX ORDER — ALBUTEROL SULFATE 2.5 MG/3ML
2.5 SOLUTION RESPIRATORY (INHALATION) EVERY 4 HOURS PRN
Status: DISCONTINUED | OUTPATIENT
Start: 2021-04-21 | End: 2021-05-03 | Stop reason: ALTCHOICE

## 2021-04-21 RX ORDER — 0.9 % SODIUM CHLORIDE 0.9 %
1000 INTRAVENOUS SOLUTION INTRAVENOUS ONCE
Status: COMPLETED | OUTPATIENT
Start: 2021-04-21 | End: 2021-04-21

## 2021-04-21 RX ADMIN — METRONIDAZOLE 500 MG: 500 INJECTION, SOLUTION INTRAVENOUS at 17:19

## 2021-04-21 RX ADMIN — ENOXAPARIN SODIUM 30 MG: 30 INJECTION SUBCUTANEOUS at 11:47

## 2021-04-21 RX ADMIN — BUDESONIDE 250 MCG: 0.25 SUSPENSION RESPIRATORY (INHALATION) at 08:57

## 2021-04-21 RX ADMIN — IOPAMIDOL 75 ML: 755 INJECTION, SOLUTION INTRAVENOUS at 06:20

## 2021-04-21 RX ADMIN — IPRATROPIUM BROMIDE AND ALBUTEROL SULFATE 1 AMPULE: .5; 3 SOLUTION RESPIRATORY (INHALATION) at 19:47

## 2021-04-21 RX ADMIN — ARFORMOTEROL TARTRATE 15 MCG: 15 SOLUTION RESPIRATORY (INHALATION) at 08:57

## 2021-04-21 RX ADMIN — CEFEPIME HYDROCHLORIDE 2000 MG: 2 INJECTION, POWDER, FOR SOLUTION INTRAVENOUS at 11:48

## 2021-04-21 RX ADMIN — VANCOMYCIN HYDROCHLORIDE 1000 MG: 1 INJECTION, POWDER, LYOPHILIZED, FOR SOLUTION INTRAVENOUS at 07:03

## 2021-04-21 RX ADMIN — IPRATROPIUM BROMIDE AND ALBUTEROL SULFATE 1 AMPULE: .5; 3 SOLUTION RESPIRATORY (INHALATION) at 15:57

## 2021-04-21 RX ADMIN — IPRATROPIUM BROMIDE AND ALBUTEROL SULFATE 1 AMPULE: .5; 3 SOLUTION RESPIRATORY (INHALATION) at 11:55

## 2021-04-21 RX ADMIN — IPRATROPIUM BROMIDE AND ALBUTEROL SULFATE 1 AMPULE: .5; 3 SOLUTION RESPIRATORY (INHALATION) at 10:44

## 2021-04-21 RX ADMIN — TIOTROPIUM BROMIDE 18 MCG: 18 CAPSULE ORAL; RESPIRATORY (INHALATION) at 08:58

## 2021-04-21 RX ADMIN — Medication 10 ML: at 11:48

## 2021-04-21 RX ADMIN — IPRATROPIUM BROMIDE AND ALBUTEROL SULFATE 1 AMPULE: .5; 3 SOLUTION RESPIRATORY (INHALATION) at 18:18

## 2021-04-21 RX ADMIN — PIPERACILLIN AND TAZOBACTAM 3375 MG: 3; .375 INJECTION, POWDER, LYOPHILIZED, FOR SOLUTION INTRAVENOUS at 06:30

## 2021-04-21 RX ADMIN — METHYLPREDNISOLONE SODIUM SUCCINATE 60 MG: 125 INJECTION, POWDER, FOR SOLUTION INTRAMUSCULAR; INTRAVENOUS at 22:14

## 2021-04-21 RX ADMIN — MORPHINE SULFATE 4 MG: 4 INJECTION, SOLUTION INTRAMUSCULAR; INTRAVENOUS at 06:56

## 2021-04-21 RX ADMIN — ARFORMOTEROL TARTRATE 15 MCG: 15 SOLUTION RESPIRATORY (INHALATION) at 10:47

## 2021-04-21 RX ADMIN — CEFEPIME HYDROCHLORIDE 2000 MG: 2 INJECTION, POWDER, FOR SOLUTION INTRAVENOUS at 16:48

## 2021-04-21 RX ADMIN — SODIUM CHLORIDE SOLN NEBU 3% 4 ML: 3 NEBU SOLN at 19:42

## 2021-04-21 RX ADMIN — ONDANSETRON 4 MG: 2 INJECTION INTRAMUSCULAR; INTRAVENOUS at 06:30

## 2021-04-21 RX ADMIN — METHYLPREDNISOLONE SODIUM SUCCINATE 60 MG: 125 INJECTION, POWDER, FOR SOLUTION INTRAMUSCULAR; INTRAVENOUS at 11:47

## 2021-04-21 RX ADMIN — SODIUM CHLORIDE 1000 ML: 9 INJECTION, SOLUTION INTRAVENOUS at 06:30

## 2021-04-21 RX ADMIN — IPRATROPIUM BROMIDE AND ALBUTEROL SULFATE 1 AMPULE: .5; 3 SOLUTION RESPIRATORY (INHALATION) at 13:40

## 2021-04-21 RX ADMIN — ARFORMOTEROL TARTRATE 15 MCG: 15 SOLUTION RESPIRATORY (INHALATION) at 19:36

## 2021-04-21 RX ADMIN — BUDESONIDE 250 MCG: 0.25 SUSPENSION RESPIRATORY (INHALATION) at 19:40

## 2021-04-21 RX ADMIN — SODIUM CHLORIDE SOLN NEBU 3% 15 ML: 3 NEBU SOLN at 13:41

## 2021-04-21 RX ADMIN — METRONIDAZOLE 500 MG: 500 INJECTION, SOLUTION INTRAVENOUS at 11:48

## 2021-04-21 RX ADMIN — HYDROMORPHONE HYDROCHLORIDE 0.5 MG: 1 INJECTION, SOLUTION INTRAMUSCULAR; INTRAVENOUS; SUBCUTANEOUS at 06:30

## 2021-04-21 RX ADMIN — SODIUM CHLORIDE: 9 INJECTION, SOLUTION INTRAVENOUS at 11:47

## 2021-04-21 RX ADMIN — CEFEPIME HYDROCHLORIDE 2000 MG: 2 INJECTION, POWDER, FOR SOLUTION INTRAVENOUS at 23:50

## 2021-04-21 RX ADMIN — METHYLPREDNISOLONE SODIUM SUCCINATE 60 MG: 125 INJECTION, POWDER, FOR SOLUTION INTRAMUSCULAR; INTRAVENOUS at 16:48

## 2021-04-21 RX ADMIN — Medication 10 ML: at 19:36

## 2021-04-21 RX ADMIN — MORPHINE SULFATE 4 MG: 4 INJECTION, SOLUTION INTRAMUSCULAR; INTRAVENOUS at 20:57

## 2021-04-21 ASSESSMENT — ENCOUNTER SYMPTOMS
ABDOMINAL DISTENTION: 0
SHORTNESS OF BREATH: 1
VOMITING: 1
EYE DISCHARGE: 0
NAUSEA: 1
COUGH: 1
APNEA: 0
ANAL BLEEDING: 0
DIARRHEA: 0
CHOKING: 0
WHEEZING: 0
EYE REDNESS: 0
CONSTIPATION: 0
BACK PAIN: 0
STRIDOR: 0
EYE PAIN: 0
EYE ITCHING: 0
BLOOD IN STOOL: 0
ABDOMINAL PAIN: 1
COLOR CHANGE: 0
CHEST TIGHTNESS: 0
PHOTOPHOBIA: 0
RECTAL PAIN: 0

## 2021-04-21 ASSESSMENT — PAIN DESCRIPTION - PAIN TYPE
TYPE: ACUTE PAIN
TYPE: ACUTE PAIN

## 2021-04-21 ASSESSMENT — PAIN SCALES - GENERAL
PAINLEVEL_OUTOF10: 6
PAINLEVEL_OUTOF10: 0
PAINLEVEL_OUTOF10: 4
PAINLEVEL_OUTOF10: 9
PAINLEVEL_OUTOF10: 9
PAINLEVEL_OUTOF10: 3
PAINLEVEL_OUTOF10: 9
PAINLEVEL_OUTOF10: 5

## 2021-04-21 ASSESSMENT — PAIN DESCRIPTION - DESCRIPTORS
DESCRIPTORS: ACHING
DESCRIPTORS: ACHING

## 2021-04-21 ASSESSMENT — PAIN DESCRIPTION - LOCATION
LOCATION: NOSE
LOCATION: NOSE
LOCATION: ABDOMEN

## 2021-04-21 ASSESSMENT — PAIN DESCRIPTION - ORIENTATION
ORIENTATION: MID
ORIENTATION: ANTERIOR

## 2021-04-21 ASSESSMENT — PAIN DESCRIPTION - FREQUENCY: FREQUENCY: CONTINUOUS

## 2021-04-21 ASSESSMENT — PAIN DESCRIPTION - ONSET: ONSET: ON-GOING

## 2021-04-21 ASSESSMENT — PAIN DESCRIPTION - PROGRESSION: CLINICAL_PROGRESSION: NOT CHANGED

## 2021-04-21 NOTE — ED NOTES
Report received from off going RN, Leonardo Mckee.       Phuc Reddy, [facility]  04/21/21 [id number]

## 2021-04-21 NOTE — ED NOTES
Bed: B-07  Expected date:   Expected time:   Means of arrival:   Comments:  75m/abdominal pain     Nikkie Lee, RN  04/21/21 [id number]

## 2021-04-21 NOTE — ED NOTES
ED SBAR report provider to [facility]le [facility]. Patient to be transported to Room 4130 via stretcher by ED tech. Patient transported with bedside cardiac monitor and with IV medications infusing. IV site clean, dry, and intact. MEWS score and pain assessed and documented. Updated patient and family on plan of care.        Benjamin Alexander [facility]  04/21/21 [id number]

## 2021-04-21 NOTE — PROGRESS NOTES
Explained importance of wearing BiPAP to patient. Pt refusing BiPAP at this time, pt c/o of pain, referring to the pressure of the mask on his NG tube. Pt is currently resting on 3L SPO2 98%.  Breath sounds are rhonchorous bilaterally RR 15

## 2021-04-21 NOTE — ED PROVIDER NOTES
[address] [facility]      Pt Name: Kodak Mary  MRN: [id number]  [place] 1945  Date of evaluation: 4/21/2021  Provider: Zohra Garcia MD    CHIEF COMPLAINT       Chief Complaint   Patient presents with    Abdominal Pain     Pt in via EMS with abdominal pain \"for 24 hours\" per nursing home. Pt states that it \"started yesterday evening\". Pt presents to ED with a rigid abdomen and rates pain as a 9/10 at this time. Pt alert and oriented and is on 3 L nasal cannula at baseline. HISTORY OF PRESENT ILLNESS    Giovanni De Leon is a 76 y.o. male who presents to the emergency department with abdominal pain. Patient endorses 24 hours of nausea, vomiting, abdominal pain. No constipation or diarrhea. No fevers or chills. Pain is 10 out of 10 dull in nature. History of bowel obstructions. Positive for cough and shortness of breath. Is on 3 L nasal cannula at baseline. Has not taken anything for pain. No other associated symptoms. Nursing Notes were reviewed. Including nursing noted for FM, Surgical History, Past Medical History, Social History, vitals, and allergies; agree with all. REVIEW OF SYSTEMS       Review of Systems   Constitutional: Positive for activity change, appetite change, chills and fatigue. Negative for diaphoresis, fever and unexpected weight change. HENT: Negative for congestion, dental problem, drooling, ear discharge and ear pain. Eyes: Negative for photophobia, pain, discharge, redness, itching and visual disturbance. Respiratory: Positive for cough and shortness of breath. Negative for apnea, choking, chest tightness, wheezing and stridor. Cardiovascular: Negative for chest pain, palpitations and leg swelling. Gastrointestinal: Positive for abdominal pain, nausea and vomiting. Negative for abdominal distention, anal bleeding, blood in stool, constipation, diarrhea and rectal pain.    Endocrine: Negative for cold 2/14/2012    COLONOSCOPY  2011    COLOSTOMY      ERCP N/A 9/30/2013    Stent removal, dilitation    HERNIA REPAIR  1976    Umb hernia    REVISION COLOSTOMY      1970s    UPPER GASTROINTESTINAL ENDOSCOPY N/A 8/20/2013    EGD,EUS,ERCP       CURRENT MEDICATIONS       Previous Medications    ALBUTEROL (PROVENTIL HFA;VENTOLIN HFA) 108 (90 BASE) MCG/ACT INHALER      Inhale 2 puffs into the lungs every 6 hours as needed for Wheezing or Shortness of Breath     ALBUTEROL (PROVENTIL) (2.5 MG/3ML) 0.083% NEBULIZER SOLUTION    Take 2.5 mg by nebulization every 4 hours as needed for Wheezing    AMLODIPINE (NORVASC) 5 MG TABLET    Take 5 mg by mouth daily    APIXABAN (ELIQUIS) 5 MG TABS TABLET    Take by mouth 2 times daily    ARFORMOTEROL TARTRATE (BROVANA) 15 MCG/2ML NEBU    Take 1 ampule by nebulization 2 times daily. BUDESONIDE (PULMICORT) 0.25 MG/2ML NEBULIZER SUSPENSION    Take 1 ampule by nebulization 2 times daily    CALCIUM CARBONATE (OSCAL) 500 MG TABS TABLET    Take 500 mg by mouth daily. DEXTROMETHORPHAN-GUAIFENESIN (MUCINEX DM)  MG PER EXTENDED RELEASE TABLET    Take 1 tablet by mouth nightly as needed for Cough    DOCUSATE SODIUM (COLACE) 100 MG CAPSULE    Take 1 capsule by mouth 2 times daily. FERROUS GLUCONATE (FERGON) 240 (27 FE) MG TABLET    Take 240 mg by mouth daily (with breakfast)    HYPROMELLOSE 0.4 % SOLN OPHTHALMIC SOLUTION    Place 1 drop into both eyes every 4 hours as needed    LIDOCAINE (LIDODERM) 5 %    Place 1 patch onto the skin daily 12 hours on, 12 hours off. LINACLOTIDE (LINZESS) 145 MCG CAPSULE    Take 145 mcg by mouth every morning (before breakfast)    LIPASE-PROTEASE-AMYLASE (CREON) 90602 UNITS DELAYED RELEASE CAPSULE    Take 24,000 Units by mouth 3 times daily (with meals)    MAGNESIUM 400 MG TABS    Take 400 mg by mouth 2 times daily    MENTHOL 5 % PTCH    Apply topically every 12 hours as needed    MISC.  DEVICES (STEP N REST II WALKER) MISC    Patient requires rolling walker for ambulation, and requires a seat due to his breathing limitations. MULTIPLE VITAMIN (MULTIVITAMIN PO)    Take 1 tablet by mouth daily. OMEPRAZOLE (PRILOSEC) 40 MG DELAYED RELEASE CAPSULE    Take 40 mg by mouth daily    ONDANSETRON (ZOFRAN) 4 MG TABLET    Take 4 mg by mouth every 12 hours as needed for Nausea or Vomiting    OXYGEN    Inhale 2 L into the lungs continuous. POLYETHYLENE GLYCOL (GLYCOLAX) POWDER    Take 17 g by mouth daily     PREDNISONE (DELTASONE) 10 MG TABLET    Take 5 mg by mouth daily     SENNA (SENOKOT) 8.6 MG TABLET    Take 2 tablets by mouth daily    TIOTROPIUM (SPIRIVA) 18 MCG INHALATION CAPSULE    Inhale 1 capsule into the lungs daily. ALLERGIES     Patient has no known allergies. FAMILY HISTORY        Family History   Problem Relation Age of Onset    Heart Disease Mother         massive MI   [de-identified] Kidney Disease Father         dialysis    High Blood Pressure Brother        SOCIAL HISTORY       Social History     Socioeconomic History    Marital status:      Spouse name: Not on file    Number of children: Not on file    Years of education: Not on file    Highest education level: Not on file   Occupational History    Not on file   Social Needs    Financial resource strain: Not on file    Food insecurity     Worry: Not on file     Inability: Not on file    Transportation needs     Medical: Not on file     Non-medical: Not on file   Tobacco Use    Smoking status: Former Smoker     Packs/day: 1.00     Years: 50.00     Pack years: 50.00     Types: Cigarettes    Smokeless tobacco: Never Used   Substance and Sexual Activity    Alcohol use: No     Alcohol/week: 0.0 standard drinks     Comment: quit 1 year ago    Drug use: No     Comment: Old history states that patient had opiod dependence.     Sexual activity: Not on file   Lifestyle    Physical activity     Days per week: Not on file     Minutes per session: Not on file    Stress: Not on file Relationships    Social connections     Talks on phone: Not on file     Gets together: Not on file     Attends [employer] service: Not on file     Active member of club or organization: Not on file     Attends meetings of clubs or organizations: Not on file     Relationship status: Not on file    Intimate partner violence     Fear of current or ex partner: Not on file     Emotionally abused: Not on file     Physically abused: Not on file     Forced sexual activity: Not on file   Other Topics Concern    Not on file   Social History Narrative    Not on file       PHYSICAL EXAM       ED Triage Vitals [04/21/21 0530]   BP Temp Temp Source Pulse Resp SpO2 Height Weight   (!) 161/70 97.7 °F (36.5 °C) Oral 110 -- 96 % -- --     Physical Exam  Vitals signs and nursing note reviewed. Constitutional:       Appearance: He is well-developed. He is ill-appearing. He is not diaphoretic. HENT:      Head: Normocephalic and atraumatic. Eyes:      General:         Right eye: No discharge. Left eye: No discharge. Pupils: Pupils are equal, round, and reactive to light. Neck:      Musculoskeletal: Normal range of motion. Thyroid: No thyromegaly. Trachea: No tracheal deviation. Cardiovascular:      Rate and Rhythm: Regular rhythm. Tachycardia present. Heart sounds: No murmur. Pulmonary:      Breath sounds: Rhonchi and rales present. No wheezing. Chest:      Chest wall: No tenderness. Abdominal:      General: There is distension. Palpations: Abdomen is soft. There is no mass. Tenderness: There is abdominal tenderness. There is guarding. There is no rebound. Musculoskeletal: Normal range of motion. General: No tenderness or deformity. Skin:     General: Skin is warm. Coloration: Skin is not pale. Findings: No erythema or rash. Neurological:      Mental Status: He is alert. Cranial Nerves: No cranial nerve deficit. Motor: No abnormal muscle tone. DIAGNOSTIC RESULTS     RADIOLOGY:   Non-plain film images such as CT, Ultrasoundand MRI are read by the radiologist. Plain radiographic images are visualized and preliminarily interpreted by the emergency physician with the below findings:    CT shows   1. Persisting diffuse small bowel dilatation with fluid-filled lumen   consistent with small bowel obstruction proximal to suggestion of focal   mechanical transition point at the central abdomen/right lower quadrant   possibly related to internal hernia. 2. Redemonstration of infrarenal fusiform abdominal aortic aneurysm with   lumen measuring up to 4.4 cm in greatest diameter, as discussed above. 3. Severe atherosclerotic disease involving the bilateral common, internal   and external iliac arterial vasculature and branches with associated   multifocal high-grade stenosis. 4. Celiac trunk origin focal high-grade stenosis secondary to marked   atherosclerotic disease. 5. Severe sigmoid colon diverticulosis without evidence of diverticulitis. 6. Bilateral lower lung lobe scattered \"tree-in-bud\" opacification pattern   consistent with bronchiolitis/small airway disease with posterior lower lung   lobe multifocal mild consolidation consistent with pneumonia. 7. Cholecystectomy with marked reservoir effect, as discussed above. 8. Splenic chronic granulomatous disease. 9. Mild diffuse distention of esophagus with fluid-filled lumen suggesting   association with gastroesophageal reflux.  Recommend clinical correlation. 10. Symmetric bilateral renal cortical volume loss suggesting association   with senescent change versus chronic medical renal disease. 11. Redemonstration of suspected left adrenal gland adenoma.      ED BEDSIDE ULTRASOUND:   Performed by ED Physician - none    LABS:  Labs Reviewed   CBC WITH AUTO DIFFERENTIAL - Abnormal; Notable for the following components:       Result Value    WBC 18.0 (*)     RBC 4.06 (*)     Hemoglobin 13.3 (*) .7 (*)     Neutrophils Absolute 15.0 (*)     Monocytes Absolute 1.5 (*)     All other components within normal limits    Narrative:     Performed at:  [facility]  [address] [facility] [employer]   Phone [phone]   BASIC METABOLIC PANEL W/ REFLEX TO MG FOR LOW K - Abnormal; Notable for the following components:    Chloride 94 (*)     CO2 35 (*)     Glucose 145 (*)     BUN 25 (*)     All other components within normal limits    Narrative:     Performed at:  [facility]  [address] [facility] [employer] [address]   Phone [phone] RAPID    Narrative:     Performed at:  [place] [address] [facility] [employer]   Phone [phone]   RAPID INFLUENZA A/B ANTIGENS    Narrative:     Performed at:  [place] [address] [facility] [employer]   Phone [phone]   CULTURE, BLOOD 1   CULTURE, BLOOD 1   LIPASE    Narrative:     Performed at:  [facility]  [address] [facility] [employer]   Phone [phone]   HEPATIC FUNCTION PANEL    Narrative:     Performed at:  [place] [address] [facility] [employer]   Phone [phone]   LACTIC ACID, PLASMA    Narrative:     Performed at:  [place] [address] [facility] [employer]   Phone [phone]   BRAIN NATRIURETIC PEPTIDE    Narrative:     Performed at:  [place] [address] [facility] [employer]   Phone [phone]   URINE RT REFLEX TO CULTURE   PROTIME-INR     All other labs were withinnormal range or not returned as of this dictation.     EMERGENCY DEPARTMENT COURSE and DIFFERENTIAL DIAGNOSIS/MDM:     PMH, Surgical Hx, FH, Social Hx reviewed by myself (ETOH usage, Tobacco usage, Drug usage reviewed by myself, no pertinent Hx)- No Pertinent Hx     Old records were reviewed by me     SBO- General surgery consulted Dr Silvio Campo. We both agree to hold on NG for now. Elevated WBC count- 2/2 SBO? Fluids and Zosyn started    PNA- Abx    Celiac stenosis- Cardiology consulted. Dr Leodan Hewitt to be by to see today. Admission to [address] [facility]   Total Critical Caretime was 39 minutes, excluding separately reportable procedures. There was a high probability of clinically significant/life threatening deterioration in the patient's condition which required my urgent intervention. PROCEDURES:  Central Line    Date/Time: 4/21/2021 6:09 AM  Performed by: Marlyn Puente MD  Authorized by: Marlyn Puente MD     Consent:     Consent obtained:  Verbal    Consent given by:  Patient    Risks discussed:  Arterial puncture, bleeding, infection, incorrect placement, pneumothorax and nerve damage    Alternatives discussed:  No treatment  Pre-procedure details:     Hand hygiene: Hand hygiene performed prior to insertion      Sterile barrier technique: All elements of maximal sterile technique followed      Skin preparation:  2% chlorhexidine    Skin preparation agent: Skin preparation agent completely dried prior to procedure    Anesthesia (see MAR for exact dosages): Anesthesia method:  Local infiltration    Local anesthetic:  Lidocaine 1% w/o epi  Procedure details:     Location:  R femoral    Patient position:  Flat    Procedural supplies:  Triple lumen    Catheter size:  7 Fr    Landmarks identified: yes      Ultrasound guidance: yes      Sterile ultrasound techniques: Sterile gel and sterile probe covers were used      Number of attempts:  4    Successful placement: yes    Post-procedure details:     Post-procedure:  Dressing applied    Assessment:  Blood return through all ports    Patient tolerance of procedure:   Tolerated well, no immediate complications  Comments:      EBL 10 cc       FINAL

## 2021-04-21 NOTE — CARE COORDINATION
Per chart review, patient is from [address] [place] [address] 18. Call to Marilu Villalobos at [address], left message requesting a call back to confirm the patient's status. Will need to verify with family the plan to return.   Lance Amaya RN, BSN, Case Management  Phone: [phone]  Electronically signed by Lance Amaya RN on 4/21/2021 at 4:28 PM

## 2021-04-21 NOTE — PROGRESS NOTES
Pharmacy Medication Reconciliation Note     List of medications patient is currently taking is complete. Source of information:   1. List received from [facility]. Reviewed and updated       No Known Allergies    Notes regarding home medications:   1. Added Alprazolam   2.   Removed Pulmicort, Iron, Lidocaine patch    Anna Rojas, [address] [place] [address]  4/21/2021  9:50 AM

## 2021-04-21 NOTE — PROGRESS NOTES
1001: PT to ICU Bed 2110 f/4th floor. Pt awakens to name, alert to self only. Pt connected to ICU monitors, positioned for comfort. 1030: NG placed per MD order. No gastic contents, stat chest xray ordered. 1050: chest xray completed, NG inserted to [address]. Placed to CLWS, no drainage at this time. 1304: BIPAP placed by RT per critical care order. 1405:  and Melinda Davis NP w/general surgery in to see patient. MD/NP assess NG and lack of drainage and assess that abdomen continues to be taut/distended/pain with palpation. BIPAP removed, pt placed on 4L O2 via nasal cannula. Pt alert/oriented X4. MD manipulates NG, once NG drainage present, NG secured and stat chest xray ordered to confirm placement. BIPAP replaced. Small leak present on BIPAP due to NG.    1515: Pt alert/oriented X4 agitated w/BIPAP and NG at this time. Nurse advises she is collecting lab work(VBG) and will advise the MD of results to see if BIPAP can be removed as he is uncomfortable. Pt nods. 1712: Pt alert/oriented X4. PT reporting pain in face/nose and asking for pain medication. Secure message sent to  by this nurse requesting PRN pain medication for patient. Pt bladder scanned at this time due to no void since arrival to ICU. Bladder scans for 423cc, pt denies need to void will advise nurse when urge arises. Nurse advises patient that the BIPAP will need to be replaced at 1740, pt states,\"I am not wearing that again, it hurts too bad on my face. Its not happening. \" Nurse advises patient that he may refuse, that is his right, but the MD recommends the BIPAP every 2 hours to ensure patient PCO2 level continues to decrease. Pt states,\"Ugh, no I'm not doing it. \"    8921:  to evaluate patient chart for need to order pain medication.     1806: Kelly Delacruz made aware that patient is refusing to allow nurse to place BIPAP back on, that pt was bladder scanned due to no void since arrival to ICU, bladder scanned for 423cc and refuses for a catheter to be placed should that be necessary. 1807:  places order for straight catheterization. Nurse to patient room to advise. 1812: Pt voids 300 cc donald urine into urinal at this time. Ordered urines sent to lab.
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
## 2021-04-21 NOTE — ED NOTES
Pt resting with eyes clothes. Pt opens eyes when names called and is verbal. A/o x3. Audible crackles in lungs when in room. Pt seems to be comfortable and is in no distress.        Annita Zamora, [place]  04/21/21 5910

## 2021-04-21 NOTE — ED TRIAGE NOTES
Patient came in from [facility] via EMS reporting abdominal pain x1 day. States the pain is 9/10 in the middle of his abd which appears distended. States he was vomiting last night. Last bowel movement was yesterday which was hard but believes it to be normal. Wears 3L O2 at baseline. Respirations have crackles, states that just started last night. A&O x4.
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
## 2021-04-21 NOTE — PROGRESS NOTES
Comprehensive Nutrition Assessment    Type and Reason for Visit:  Initial    Nutrition Recommendations/Plan:   Current NPO diet  -Monitor for the advancement of diet    Nutrition Assessment:  Initial. Pt admitted to ED from [facility] with complaints of 9/10 abdominal pain and emesis last night. Pt with CT showing SBO. Pt non-ambulatory and bed bound at the [facility] home. PMHx of COPD, HTN, Malnutrition. Pt with admission weight of 88 lbs and a BMI of 13.78. Current NPO diet. Nutrition will continue to monitor while inpatient.     Malnutrition Assessment:  Malnutrition Status:  Severe malnutrition    Context:  Chronic Illness     Findings of the 6 clinical characteristics of malnutrition:  Body Fat Loss:  7 - Severe body fat loss Buccal region, Orbital, Triceps   Muscle Mass Loss:  7 - Severe muscle mass loss Temples (temporalis), Clavicles (pectoralis & deltoids), Hand (interosseous)  Fluid Accumulation:  Unable to assess    Strength:  Not Performed    Estimated Daily Nutrient Needs:  Energy (kcal):  997-1197 kcal/day (25-30 kcal/kg CBW)   Protein (g):  48-80 g/day (1.2-2 g/kg CBW)  Fluid (ml/day):  Per MD    Nutrition Related Findings:  BUN 25, glucose 145, stomach distended and hard      Wounds:  None       Current Nutrition Therapies:    Diet NPO Effective Now    Anthropometric Measures:  · Height: 5' 7\" (170.2 cm)  · Current Body Weight: 88 lb (39.9 kg)   · Admission Body Weight: 88 lb (39.9 kg)     · Ideal Body Weight: 148 lbs; % Ideal Body Weight 59.5 %   · BMI: 13.8  · BMI Categories: Underweight (BMI less than 22) age over 72       Nutrition Diagnosis:   · Severe malnutrition related to inadequate protein-energy intake as evidenced by severe loss of subcutaneous fat, severe muscle loss, (BMI of 13.7)      Nutrition Interventions:   Food and/or Nutrient Delivery:  Continue NPO  Nutrition Education/Counseling:  Education not indicated   Coordination of Nutrition Care:  Continue to monitor while inpatient

## 2021-04-21 NOTE — PROGRESS NOTES
Occupational Therapy  No Treatment  Pt is being transferred to ICU. Will need new orders for evaluation when appropriate. Agueda Wheeler.  [address] [facility], OTR/L [id number]

## 2021-04-21 NOTE — PROGRESS NOTES
Clinical Pharmacy Note  Vancomycin Consult    Giovanni De Leon is a 76 y.o. male ordered Vancomycin for PNA ; consult received from Dr. Chantal Gonzalez to manage therapy. Also receiving Cefepime. Patient Active Problem List   Diagnosis    Abdominal pain    Hypertension    COPD (chronic obstructive pulmonary disease) with emphysema (HCC)    Hypercapnic respiratory failure, chronic (HCC)    Partial small bowel obstruction (HCC)    Nausea and vomiting    Pneumonia    Constipation due to pain medication    Small bowel obstruction (HCC)    Sepsis (HCC)    GI bleed    Aortic aneurysm (HCC)    Gram-negative pneumonia (HCC)       Allergies:  Patient has no known allergies. Temp max:  Temp (24hrs), Av °F (36.7 °C), Min:97.7 °F (36.5 °C), Max:98.2 °F (36.8 °C)      Recent Labs     21  0614   WBC 18.0*       Recent Labs     21  0615   BUN 25*   CREATININE 0.9       No intake or output data in the 24 hours ending 21 0831    Culture Results:  MRSA swab ordered   Blood, Sputum,Urine cultures ordered     Ht Readings from Last 1 Encounters:   21 5' 7\" (1.702 m)        Wt Readings from Last 1 Encounters:   21 88 lb (39.9 kg)         CrCl cannot be calculated (Unknown ideal weight. ). Assessment/Plan:  Vancomycin 1000 mg IV was given in the ED  Will get a random level in AM of 21 and re dose from there     Thank you for the consult.    Mika Talamantes, [address] [facility]

## 2021-04-22 ENCOUNTER — APPOINTMENT (OUTPATIENT)
Dept: ULTRASOUND IMAGING | Age: 76
DRG: 871 | End: 2021-04-22
Payer: MEDICARE

## 2021-04-22 LAB
A/G RATIO: 1.4 (ref 1.1–2.2)
ALBUMIN SERPL-MCNC: 3.5 G/DL (ref 3.4–5)
ALP BLD-CCNC: 210 U/L (ref 40–129)
ALT SERPL-CCNC: 528 U/L (ref 10–40)
ANION GAP SERPL CALCULATED.3IONS-SCNC: 9 MMOL/L (ref 3–16)
AST SERPL-CCNC: 341 U/L (ref 15–37)
BASE EXCESS VENOUS: 2.8 MMOL/L
BASE EXCESS VENOUS: 5.6 MMOL/L
BASOPHILS ABSOLUTE: 0 K/UL (ref 0–0.2)
BASOPHILS RELATIVE PERCENT: 0 %
BILIRUB SERPL-MCNC: 0.4 MG/DL (ref 0–1)
BUN BLDV-MCNC: 28 MG/DL (ref 7–20)
CALCIUM SERPL-MCNC: 8 MG/DL (ref 8.3–10.6)
CARBOXYHEMOGLOBIN: 1.3 %
CARBOXYHEMOGLOBIN: 1.4 %
CHLORIDE BLD-SCNC: 103 MMOL/L (ref 99–110)
CO2: 31 MMOL/L (ref 21–32)
CREAT SERPL-MCNC: 0.9 MG/DL (ref 0.8–1.3)
EOSINOPHILS ABSOLUTE: 0 K/UL (ref 0–0.6)
EOSINOPHILS RELATIVE PERCENT: 0 %
GFR AFRICAN AMERICAN: >60
GFR NON-AFRICAN AMERICAN: >60
GLOBULIN: 2.5 G/DL
GLUCOSE BLD-MCNC: 160 MG/DL (ref 70–99)
GLUCOSE BLD-MCNC: 90 MG/DL (ref 70–99)
HCO3 VENOUS: 33 MMOL/L (ref 23–29)
HCO3 VENOUS: 34 MMOL/L (ref 23–29)
HCT VFR BLD CALC: 33.2 % (ref 40.5–52.5)
HEMOGLOBIN: 10.7 G/DL (ref 13.5–17.5)
L. PNEUMOPHILA SEROGP 1 UR AG: NORMAL
LYMPHOCYTES ABSOLUTE: 0.3 K/UL (ref 1–5.1)
LYMPHOCYTES RELATIVE PERCENT: 2 %
MAGNESIUM: 2.6 MG/DL (ref 1.8–2.4)
MCH RBC QN AUTO: 33 PG (ref 26–34)
MCHC RBC AUTO-ENTMCNC: 32.3 G/DL (ref 31–36)
MCV RBC AUTO: 102.3 FL (ref 80–100)
METHEMOGLOBIN VENOUS: 0.5 %
METHEMOGLOBIN VENOUS: 0.6 %
MONOCYTES ABSOLUTE: 0.5 K/UL (ref 0–1.3)
MONOCYTES RELATIVE PERCENT: 3 %
MRSA SCREEN RT-PCR: NORMAL
NEUTROPHILS ABSOLUTE: 16.6 K/UL (ref 1.7–7.7)
NEUTROPHILS RELATIVE PERCENT: 95 %
O2 CONTENT, VEN: 11 ML/DL
O2 CONTENT, VEN: 12 ML/DL
O2 SAT, VEN: 74 %
O2 SAT, VEN: 80 %
O2 THERAPY: ABNORMAL
O2 THERAPY: ABNORMAL
PCO2, VEN: 69.9 MMHG (ref 40–50)
PCO2, VEN: 84.7 MMHG (ref 40–50)
PDW BLD-RTO: 13.8 % (ref 12.4–15.4)
PERFORMED ON: NORMAL
PH VENOUS: 7.2 (ref 7.35–7.45)
PH VENOUS: 7.29 (ref 7.35–7.45)
PHOSPHORUS: 4 MG/DL (ref 2.5–4.9)
PLATELET # BLD: 150 K/UL (ref 135–450)
PMV BLD AUTO: 8 FL (ref 5–10.5)
PO2, VEN: 39 MMHG
PO2, VEN: 49 MMHG
POTASSIUM REFLEX MAGNESIUM: 5 MMOL/L (ref 3.5–5.1)
PROCALCITONIN: 5.71 NG/ML (ref 0–0.15)
RBC # BLD: 3.25 M/UL (ref 4.2–5.9)
SODIUM BLD-SCNC: 143 MMOL/L (ref 136–145)
STREP PNEUMONIAE ANTIGEN, URINE: NORMAL
TCO2 CALC VENOUS: 36 MMOL/L
TCO2 CALC VENOUS: 36 MMOL/L
TOTAL PROTEIN: 6 G/DL (ref 6.4–8.2)
VANCOMYCIN RANDOM: 6.9 UG/ML
WBC # BLD: 17.4 K/UL (ref 4–11)

## 2021-04-22 PROCEDURE — 82803 BLOOD GASES ANY COMBINATION: CPT

## 2021-04-22 PROCEDURE — 36415 COLL VENOUS BLD VENIPUNCTURE: CPT

## 2021-04-22 PROCEDURE — 94761 N-INVAS EAR/PLS OXIMETRY MLT: CPT

## 2021-04-22 PROCEDURE — 80053 COMPREHEN METABOLIC PANEL: CPT

## 2021-04-22 PROCEDURE — 99232 SBSQ HOSP IP/OBS MODERATE 35: CPT | Performed by: SURGERY

## 2021-04-22 PROCEDURE — APPSS15 APP SPLIT SHARED TIME 0-15 MINUTES: Performed by: NURSE PRACTITIONER

## 2021-04-22 PROCEDURE — 99233 SBSQ HOSP IP/OBS HIGH 50: CPT | Performed by: INTERNAL MEDICINE

## 2021-04-22 PROCEDURE — 94640 AIRWAY INHALATION TREATMENT: CPT

## 2021-04-22 PROCEDURE — 85025 COMPLETE CBC W/AUTO DIFF WBC: CPT

## 2021-04-22 PROCEDURE — APPNB15 APP NON BILLABLE TIME 0-15 MINS: Performed by: NURSE PRACTITIONER

## 2021-04-22 PROCEDURE — 84100 ASSAY OF PHOSPHORUS: CPT

## 2021-04-22 PROCEDURE — 2500000003 HC RX 250 WO HCPCS: Performed by: NURSE PRACTITIONER

## 2021-04-22 PROCEDURE — 6370000000 HC RX 637 (ALT 250 FOR IP): Performed by: INTERNAL MEDICINE

## 2021-04-22 PROCEDURE — 6360000002 HC RX W HCPCS: Performed by: NURSE PRACTITIONER

## 2021-04-22 PROCEDURE — 6370000000 HC RX 637 (ALT 250 FOR IP): Performed by: NURSE PRACTITIONER

## 2021-04-22 PROCEDURE — 6360000002 HC RX W HCPCS: Performed by: STUDENT IN AN ORGANIZED HEALTH CARE EDUCATION/TRAINING PROGRAM

## 2021-04-22 PROCEDURE — 6370000000 HC RX 637 (ALT 250 FOR IP): Performed by: STUDENT IN AN ORGANIZED HEALTH CARE EDUCATION/TRAINING PROGRAM

## 2021-04-22 PROCEDURE — 2580000003 HC RX 258: Performed by: INTERNAL MEDICINE

## 2021-04-22 PROCEDURE — 80202 ASSAY OF VANCOMYCIN: CPT

## 2021-04-22 PROCEDURE — 2700000000 HC OXYGEN THERAPY PER DAY

## 2021-04-22 PROCEDURE — 6360000002 HC RX W HCPCS: Performed by: INTERNAL MEDICINE

## 2021-04-22 PROCEDURE — 84145 PROCALCITONIN (PCT): CPT

## 2021-04-22 PROCEDURE — 2500000003 HC RX 250 WO HCPCS: Performed by: INTERNAL MEDICINE

## 2021-04-22 PROCEDURE — 83735 ASSAY OF MAGNESIUM: CPT

## 2021-04-22 PROCEDURE — 94660 CPAP INITIATION&MGMT: CPT

## 2021-04-22 PROCEDURE — 76705 ECHO EXAM OF ABDOMEN: CPT

## 2021-04-22 PROCEDURE — 2000000000 HC ICU R&B

## 2021-04-22 RX ORDER — KETOROLAC TROMETHAMINE 15 MG/ML
15 INJECTION, SOLUTION INTRAMUSCULAR; INTRAVENOUS ONCE
Status: COMPLETED | OUTPATIENT
Start: 2021-04-22 | End: 2021-04-22

## 2021-04-22 RX ORDER — LORAZEPAM 2 MG/ML
0.5 INJECTION INTRAMUSCULAR ONCE
Status: COMPLETED | OUTPATIENT
Start: 2021-04-22 | End: 2021-04-22

## 2021-04-22 RX ORDER — IPRATROPIUM BROMIDE AND ALBUTEROL SULFATE 2.5; .5 MG/3ML; MG/3ML
1 SOLUTION RESPIRATORY (INHALATION) EVERY 4 HOURS
Status: DISCONTINUED | OUTPATIENT
Start: 2021-04-22 | End: 2021-05-03

## 2021-04-22 RX ORDER — LANOLIN ALCOHOL/MO/W.PET/CERES
6 CREAM (GRAM) TOPICAL ONCE
Status: COMPLETED | OUTPATIENT
Start: 2021-04-22 | End: 2021-04-22

## 2021-04-22 RX ORDER — LEVOFLOXACIN 5 MG/ML
500 INJECTION, SOLUTION INTRAVENOUS EVERY 24 HOURS
Status: DISCONTINUED | OUTPATIENT
Start: 2021-04-22 | End: 2021-04-28

## 2021-04-22 RX ORDER — BISACODYL 10 MG
10 SUPPOSITORY, RECTAL RECTAL ONCE
Status: COMPLETED | OUTPATIENT
Start: 2021-04-22 | End: 2021-04-22

## 2021-04-22 RX ADMIN — POLYETHYLENE GLYCOL 3350 17 G: 17 POWDER, FOR SOLUTION ORAL at 08:53

## 2021-04-22 RX ADMIN — METHYLPREDNISOLONE SODIUM SUCCINATE 60 MG: 125 INJECTION, POWDER, FOR SOLUTION INTRAMUSCULAR; INTRAVENOUS at 03:09

## 2021-04-22 RX ADMIN — IPRATROPIUM BROMIDE AND ALBUTEROL SULFATE 1 AMPULE: .5; 3 SOLUTION RESPIRATORY (INHALATION) at 00:27

## 2021-04-22 RX ADMIN — Medication 10 ML: at 08:22

## 2021-04-22 RX ADMIN — SODIUM CHLORIDE SOLN NEBU 3% 15 ML: 3 NEBU SOLN at 07:39

## 2021-04-22 RX ADMIN — KETOROLAC TROMETHAMINE 15 MG: 15 INJECTION, SOLUTION INTRAMUSCULAR; INTRAVENOUS at 20:17

## 2021-04-22 RX ADMIN — IPRATROPIUM BROMIDE AND ALBUTEROL SULFATE 1 AMPULE: .5; 3 SOLUTION RESPIRATORY (INHALATION) at 04:44

## 2021-04-22 RX ADMIN — IPRATROPIUM BROMIDE AND ALBUTEROL SULFATE 1 AMPULE: .5; 3 SOLUTION RESPIRATORY (INHALATION) at 20:48

## 2021-04-22 RX ADMIN — IPRATROPIUM BROMIDE AND ALBUTEROL SULFATE 1 AMPULE: .5; 3 SOLUTION RESPIRATORY (INHALATION) at 02:33

## 2021-04-22 RX ADMIN — FAMOTIDINE 20 MG: 10 INJECTION, SOLUTION INTRAVENOUS at 19:35

## 2021-04-22 RX ADMIN — ENOXAPARIN SODIUM 30 MG: 30 INJECTION SUBCUTANEOUS at 08:22

## 2021-04-22 RX ADMIN — SODIUM CHLORIDE SOLN NEBU 3% 15 ML: 3 NEBU SOLN at 11:46

## 2021-04-22 RX ADMIN — LEVOFLOXACIN 500 MG: 5 INJECTION, SOLUTION INTRAVENOUS at 11:06

## 2021-04-22 RX ADMIN — BISMUTH SUBSALICYLATE 30 ML: 525 LIQUID ORAL at 21:57

## 2021-04-22 RX ADMIN — IPRATROPIUM BROMIDE AND ALBUTEROL SULFATE 1 AMPULE: .5; 3 SOLUTION RESPIRATORY (INHALATION) at 07:39

## 2021-04-22 RX ADMIN — Medication 10 ML: at 19:35

## 2021-04-22 RX ADMIN — IPRATROPIUM BROMIDE AND ALBUTEROL SULFATE 1 AMPULE: .5; 3 SOLUTION RESPIRATORY (INHALATION) at 11:46

## 2021-04-22 RX ADMIN — BUDESONIDE 250 MCG: 0.25 SUSPENSION RESPIRATORY (INHALATION) at 07:42

## 2021-04-22 RX ADMIN — FAMOTIDINE 20 MG: 10 INJECTION, SOLUTION INTRAVENOUS at 13:24

## 2021-04-22 RX ADMIN — LORAZEPAM 0.5 MG: 2 INJECTION INTRAMUSCULAR; INTRAVENOUS at 03:09

## 2021-04-22 RX ADMIN — BUDESONIDE 250 MCG: 0.25 SUSPENSION RESPIRATORY (INHALATION) at 20:50

## 2021-04-22 RX ADMIN — METHYLPREDNISOLONE SODIUM SUCCINATE 60 MG: 125 INJECTION, POWDER, FOR SOLUTION INTRAMUSCULAR; INTRAVENOUS at 19:35

## 2021-04-22 RX ADMIN — SODIUM CHLORIDE: 9 INJECTION, SOLUTION INTRAVENOUS at 03:09

## 2021-04-22 RX ADMIN — METHYLPREDNISOLONE SODIUM SUCCINATE 60 MG: 125 INJECTION, POWDER, FOR SOLUTION INTRAMUSCULAR; INTRAVENOUS at 21:56

## 2021-04-22 RX ADMIN — ARFORMOTEROL TARTRATE 15 MCG: 15 SOLUTION RESPIRATORY (INHALATION) at 07:39

## 2021-04-22 RX ADMIN — METRONIDAZOLE 500 MG: 500 INJECTION, SOLUTION INTRAVENOUS at 01:37

## 2021-04-22 RX ADMIN — ARFORMOTEROL TARTRATE 15 MCG: 15 SOLUTION RESPIRATORY (INHALATION) at 20:48

## 2021-04-22 RX ADMIN — IPRATROPIUM BROMIDE AND ALBUTEROL SULFATE 1 AMPULE: .5; 3 SOLUTION RESPIRATORY (INHALATION) at 23:55

## 2021-04-22 RX ADMIN — VANCOMYCIN HYDROCHLORIDE 750 MG: 750 INJECTION, POWDER, LYOPHILIZED, FOR SOLUTION INTRAVENOUS at 08:53

## 2021-04-22 RX ADMIN — IPRATROPIUM BROMIDE AND ALBUTEROL SULFATE 1 AMPULE: .5; 3 SOLUTION RESPIRATORY (INHALATION) at 16:21

## 2021-04-22 RX ADMIN — METHYLPREDNISOLONE SODIUM SUCCINATE 60 MG: 125 INJECTION, POWDER, FOR SOLUTION INTRAMUSCULAR; INTRAVENOUS at 11:06

## 2021-04-22 RX ADMIN — BISACODYL 10 MG: 10 SUPPOSITORY RECTAL at 13:24

## 2021-04-22 RX ADMIN — Medication 6 MG: at 23:12

## 2021-04-22 RX ADMIN — CEFEPIME HYDROCHLORIDE 2000 MG: 2 INJECTION, POWDER, FOR SOLUTION INTRAVENOUS at 08:22

## 2021-04-22 ASSESSMENT — PAIN SCALES - GENERAL
PAINLEVEL_OUTOF10: 9
PAINLEVEL_OUTOF10: 9
PAINLEVEL_OUTOF10: 0
PAINLEVEL_OUTOF10: 4
PAINLEVEL_OUTOF10: 6

## 2021-04-22 NOTE — PROGRESS NOTES
[facility] [phone]                                     Daily Progress Note                                                         Pt Name: Giovanni Ha Day  Medical Record Number: [id number]  Date of Birth 1945   Today's Date: 4/22/2021  Chief Complaint   Patient presents with    Abdominal Pain     Pt in via EMS with abdominal pain \"for 24 hours\" per nursing home. Pt states that it \"started yesterday evening\". Pt presents to ED with a rigid abdomen and rates pain as a 9/10 at this time. Pt alert and oriented and is on 3 L nasal cannula at baseline. ASSESSMENT/PLAN  75 yo male with severe COPD, bilateral LL pneumonia, SBO vs ileus     SBO vs ileus - Pt pulled out NG overnight and refused replacement. Continue IV hydration. Poor surgical candidate. Passing flatus.      Severe COPD, bilateral LL pneumonia, hypercarbia - BIPAP per pulmonology. On vancomycin, cefepime, flagyl. Steroids per pulmonology     Hx of HTN, weight loss, deconditioning - per primary team.     Severe malnutrition - will start supplements when tolerating diet       SUBJECTIVE  Giovanni Ha is completely alert and oriented today. Denies nausea. Passing flatus, can't remember when his last BM was. Distention improved. No pain. OBJECTIVE  VITALS:  height is 5' 7\" (1.702 m) and weight is 113 lb 8.6 oz (51.5 kg). His oral temperature is 98.4 °F (36.9 °C). His blood pressure is 143/60 (abnormal) and his pulse is 114. His respiration is 13 and oxygen saturation is 99%.  INTAKE/OUTPUT:      Intake/Output Summary (Last 24 hours) at 4/22/2021 1151  Last data filed at 4/22/2021 1031  Gross per 24 hour   Intake 2102 ml   Output 1000 ml   Net 1102 ml     GENERAL: alert, cooperative, no distress  LUNGS: clear to ausculation, without wheezes, rales or rhonci  HEART: normal rate and regular rhythm  ABDOMEN: Soft, appropriately tender/nontender, incisions c/d/I,  non distended. EXTREMITY: no cyanosis, clubbing or edema    I/O last 3 completed shifts: In: 1912 [I.V.:1612; IV Piggyback:300]  Out: 1000 [Urine:700; Emesis/NG output:300]      LABS  Recent Labs     04/21/21  0614 04/21/21  0614 04/21/21  0615 04/21/21  1815 04/22/21  0315   WBC 18.0*  --   --   --  17.4*   HGB 13.3*  --   --   --  10.7*   HCT 41.3  --   --   --  33.2*     --   --   --  150   NA  --    < > 138  --  143   K  --    < > 4.6  --  5.0   CL  --    < > 94*  --  103   CO2  --    < > 35*  --  31   BUN  --    < > 25*  --  28*   CREATININE  --    < > 0.9  --  0.9   MG 2.60*  --   --   --  2.60*   PHOS 4.1  --   --   --  4.0   CALCIUM  --    < > 9.4  --  8.0*   INR 1.15*  --   --   --   --    AST  --    < > 23  --  341*   ALT  --    < > 21  --  528*   BILITOT  --    < > 0.5  --  0.4   BILIDIR  --   --  <0.2  --   --    NITRU  --   --   --  Negative  --    COLORU  --   --   --  YELLOW  --     < > = values in this interval not displayed. EDUCATION  Patient educated about Disease Process, Medications, Smoking Cessation, Oxygenation, Incentive Spirometry and Deep Breath and Cough, Diabetes, Hyperlipidemia, Smoking Cessation, Nutrition, Exercise and Hypertension    Electronically signed by PAUL Begum CNP on 4/22/2021 at 11:51 AM      [facility]   [phone] Office  [phone] Cell     Agree with above note. The patient was personally seen and examined. Brayan Quarry Day is doing better today. Able to interact without difficulty and give history. Denies any nausea currently. Minimal flatus. Denies any abdominal pain. Abd soft, less distended, NT    WBC 17.4  Cr 0.9    A/P: 75 yo male with SBO vs ileus, pneumonia, severe COPD, protein calorie malnutrition    OK to leave NG out. Sips/chips.   Await improved GI function  Pneumonia/COPD - continue IV abx, steroids per pulmonology    Malnutrition - nutritional supplements once on diet    [place], MD

## 2021-04-22 NOTE — PLAN OF CARE
Problem: Pain:  Goal: Pain level will decrease  Description: Pain level will decrease  4/22/2021 0046 by Flakita Leal RN  Outcome: Ongoing  4/21/2021 1755 by Olvin Bland RN  Outcome: Ongoing  Goal: Control of acute pain  Description: Control of acute pain  4/22/2021 0046 by Flakita Leal RN  Outcome: Ongoing  4/21/2021 1755 by Olvin Bland RN  Outcome: Ongoing  Goal: Control of chronic pain  Description: Control of chronic pain  4/22/2021 0046 by Flakita Leal RN  Outcome: Ongoing  4/21/2021 1755 by Olvin Bland RN  Outcome: Ongoing  Goal: Patient's pain/discomfort is manageable  Description: Patient's pain/discomfort is manageable  4/22/2021 0046 by Flakita Leal RN  Outcome: Ongoing  4/21/2021 1755 by Olvin Bland RN  Outcome: Ongoing     Problem: Nutrition  Goal: Optimal nutrition therapy  4/22/2021 0046 by Flakita Leal RN  Outcome: Ongoing  4/21/2021 1755 by Olvin Bland RN  Outcome: Ongoing  4/21/2021 1213 by Armin Aguayo  Outcome: Ongoing     Problem: Infection:  Goal: Will remain free from infection  Description: Will remain free from infection  4/22/2021 0046 by Flakita Leal RN  Outcome: Ongoing  4/21/2021 1755 by Olvin Bland RN  Outcome: Ongoing     Problem: Safety:  Goal: Free from accidental physical injury  Description: Free from accidental physical injury  4/22/2021 0046 by Flakita Leal RN  Outcome: Ongoing  4/21/2021 1755 by Olvin Bland RN  Outcome: Ongoing  Goal: Free from intentional harm  Description: Free from intentional harm  4/22/2021 0046 by Flakita Leal RN  Outcome: Ongoing  4/21/2021 1755 by Olvin Bland RN  Outcome: Ongoing     Problem: Daily Care:  Goal: Daily care needs are met  Description: Daily care needs are met  4/22/2021 0046 by Flakita Leal RN  Outcome: Ongoing  4/21/2021 1755 by Olvin Bland RN  Outcome: Ongoing     Problem: Skin Integrity:  Goal: Skin integrity will stabilize  Description: Skin integrity will stabilize  4/22/2021 0046 by [facility] Johnathan De La Torre RN  Outcome: Ongoing  4/21/2021 1755 by Catie Escobar RN  Outcome: Ongoing     Problem: Discharge Planning:  Goal: Patients continuum of care needs are met  Description: Patients continuum of care needs are met  4/22/2021 0046 by Delmy Musa RN  Outcome: Ongoing  4/21/2021 1755 by Catie Escobar RN  Outcome: Ongoing     Problem: Skin Integrity:  Goal: Will show no infection signs and symptoms  Description: Will show no infection signs and symptoms  Outcome: Ongoing  Goal: Absence of new skin breakdown  Description: Absence of new skin breakdown  Outcome: Ongoing     Problem: Falls - Risk of:  Goal: Will remain free from falls  Description: Will remain free from falls  Outcome: Ongoing  Goal: Absence of physical injury  Description: Absence of physical injury  Outcome: Ongoing High Risk (score 12 or above)

## 2021-04-22 NOTE — PROGRESS NOTES
Hospitalist Progress Note      PCP: Demetria Candelario MD    Date of Admission: 4/21/2021        Subjective: much more awake, having some cough, improved abdominal ain, no fever or chills. Medications:  Reviewed    Infusion Medications    sodium chloride      sodium chloride 75 mL/hr at 04/22/21 0309     Scheduled Medications    amLODIPine  5 mg Oral Daily    budesonide  250 mcg Nebulization BID    omeprazole  40 mg Oral Daily    sodium chloride flush  5-40 mL Intravenous 2 times per day    cefepime  2,000 mg Intravenous Q8H    vancomycin (VANCOCIN) intermittent dosing (placeholder)   Other RX Placeholder    enoxaparin  30 mg Subcutaneous Daily    metroNIDAZOLE  500 mg Intravenous Q8H    methylPREDNISolone  60 mg Intravenous Q6H    ipratropium-albuterol  1 ampule Inhalation Q2H    Arformoterol Tartrate  15 mcg Nebulization BID    sodium chloride (Inhalant)  15 mL Nebulization TID     PRN Meds: albuterol sulfate HFA, albuterol, sodium chloride flush, sodium chloride, promethazine **OR** ondansetron, polyethylene glycol, acetaminophen **OR** acetaminophen      Intake/Output Summary (Last 24 hours) at 4/22/2021 0701  Last data filed at 4/22/2021 0600  Gross per 24 hour   Intake 1912 ml   Output 1000 ml   Net 912 ml       Physical Exam Performed:    BP (!) 134/56   Pulse 94   Temp 98.4 °F (36.9 °C) (Oral)   Resp 20   Ht 5' 7\" (1.702 m)   Wt 113 lb 8.6 oz (51.5 kg)   SpO2 100%   BMI 17.78 kg/m²     General appearance: No apparent distress  Neck: Supple  Respiratory:  Diminished breath sounds   Cardiovascular: Regular rate and rhythm with normal S1/S2 without murmurs, rubs or gallops. Abdomen: Soft, distended, mildly tender   Musculoskeletal: No clubbing, cyanosis   Skin: Skin color, texture, turgor normal.  No rashes or lesions.   Neurologic:  No focal weakness   Psychiatric: Awake   Capillary Refill: Brisk,3 seconds, normal   Peripheral Pulses: +2 palpable, equal bilaterally       Labs: Recent Labs     04/21/21 0614 04/22/21 0315   WBC 18.0* 17.4*   HGB 13.3* 10.7*   HCT 41.3 33.2*    150     Recent Labs     04/21/21 0614 04/21/21 0615 04/22/21 0315   NA  --  138 143   K  --  4.6 5.0   CL  --  94* 103   CO2  --  35* 31   BUN  --  25* 28*   CREATININE  --  0.9 0.9   CALCIUM  --  9.4 8.0*   PHOS 4.1  --   --      Recent Labs     04/21/21 0615 04/22/21 0315   AST 23 341*   ALT 21 528*   BILIDIR <0.2  --    BILITOT 0.5 0.4   ALKPHOS 123 210*     Recent Labs     04/21/21 0614   INR 1.15*     No results for input(s): Coral Lee in the last 72 hours. Urinalysis:      Lab Results   Component Value Date    NITRU Negative 04/21/2021    WBCUA 7 04/21/2021    BACTERIA 1+ 06/17/2014    RBCUA 2 04/21/2021    RBCUA 2 11/19/2011    BLOODU TRACE 04/21/2021    SPECGRAV >1.030 04/21/2021    GLUCOSEU Negative 04/21/2021       Radiology:  XR ABDOMEN (KUB) (SINGLE AP VIEW)   Final Result   Nasogastric tube projects to the proximal stomach, normal position. Persistent dilated loops of small bowel in the upper abdomen. XR CHEST PORTABLE   Final Result   No acute cardiac or pulmonary disease. NG tube extends into the stomach. CTA ABDOMEN PELVIS W CONTRAST   Final Result   1. Persisting diffuse small bowel dilatation with fluid-filled lumen   consistent with small bowel obstruction proximal to suggestion of focal   mechanical transition point at the central abdomen/right lower quadrant   possibly related to internal hernia. 2. Redemonstration of infrarenal fusiform abdominal aortic aneurysm with   lumen measuring up to 4.4 cm in greatest diameter, as discussed above. 3. Severe atherosclerotic disease involving the bilateral common, internal   and external iliac arterial vasculature and branches with associated   multifocal high-grade stenosis. 4. Celiac trunk origin focal high-grade stenosis secondary to marked   atherosclerotic disease.    5. Severe sigmoid colon diverticulosis without evidence of diverticulitis. 6. Bilateral lower lung lobe scattered \"tree-in-bud\" opacification pattern   consistent with bronchiolitis/small airway disease with posterior lower lung   lobe multifocal mild consolidation consistent with pneumonia. 7. Cholecystectomy with marked reservoir effect, as discussed above. 8. Splenic chronic granulomatous disease. 9. Mild diffuse distention of esophagus with fluid-filled lumen suggesting   association with gastroesophageal reflux. Recommend clinical correlation. 10. Symmetric bilateral renal cortical volume loss suggesting association   with senescent change versus chronic medical renal disease. 11. Redemonstration of suspected left adrenal gland adenoma. RECOMMENDATIONS:   4.4 cm infrarenal abdominal aortic aneurysm. Recommend follow-up every 12   months and vascular consultation. Reference: J Am Sophie Radiol 2234;26:661-785. CT CHEST WO CONTRAST   Final Result   1. Persisting diffuse small bowel dilatation with fluid-filled lumen   consistent with small bowel obstruction proximal to suggestion of focal   mechanical transition point at the central abdomen/right lower quadrant   possibly related to internal hernia. 2. Redemonstration of infrarenal fusiform abdominal aortic aneurysm with   lumen measuring up to 4.4 cm in greatest diameter, as discussed above. 3. Severe atherosclerotic disease involving the bilateral common, internal   and external iliac arterial vasculature and branches with associated   multifocal high-grade stenosis. 4. Celiac trunk origin focal high-grade stenosis secondary to marked   atherosclerotic disease. 5. Severe sigmoid colon diverticulosis without evidence of diverticulitis. 6. Bilateral lower lung lobe scattered \"tree-in-bud\" opacification pattern   consistent with bronchiolitis/small airway disease with posterior lower lung   lobe multifocal mild consolidation consistent with pneumonia. Elevated LFTs, ?sepsis related, will get an US. Diet: Diet NPO Effective Now  Code Status: Full Code      Dispo - Critically ill in ICU.      Clau Plata MD

## 2021-04-22 NOTE — PROGRESS NOTES
@1150: Pt Ng out by 5 cm, from 70 to 65 cm. Pt refused that RN repositioned the NG stating that it hurt. Will continue to monitor.

## 2021-04-22 NOTE — PROGRESS NOTES
Pulmonary Progress Note    CC: Acute on chronic hypercarbic respiratory failure  Very severe COPD with acute exacerbation  Bilateral lower lobe pneumonia  Mucus plugging   Small bowel obstruction   Acute metabolic encephalopathy   Transaminitis     24 hr events:  Patient removed his NGT overnight and would not allow for replacement. Wore BiPAP overnight. Now on nasal cannula. ROS:  No SOB  +cough  No vomiting     PHYSICAL EXAM:  Blood pressure (!) 160/78, pulse 105, temperature 98.4 °F (36.9 °C), temperature source Oral, resp. rate 18, height 5' 7\" (1.702 m), weight 113 lb 8.6 oz (51.5 kg), SpO2 99 %. on 3L NC    Intake/Output Summary (Last 24 hours) at 4/22/2021 0750  Last data filed at 4/22/2021 0600  Gross per 24 hour   Intake 1912 ml   Output 1000 ml   Net 912 ml       Gen: Well developed; thin  Eyes: No scleral icterus. No conjunctival injection. ENT:  External appearance of ears and nose normal.  Neck: Trachea midline. No obvious mass. No visible thyroid enlargement    Respiratory: Diffusely decreased breath sounds with crackles over bilateral lower lung zones, no accessory muscle use  Cardiovascular: Regular rate and rhythm, no appreciable murmurs. No edema. Gastrointestinal: Distended, non-tender. No hernia  Skin: Warm and dry. No rashes or ulcers on visible areas. Normal texture and turgor  Musculoskeletal: No cyanosis, clubbing or joint deformity.     Psychiatric: Normal mood and affect; exhibits normal insight and judgement     Medications:  Current Facility-Administered Medications: albuterol sulfate  (90 Base) MCG/ACT inhaler 2 puff, 2 puff, Inhalation, Q6H PRN  albuterol (PROVENTIL) nebulizer solution 2.5 mg, 2.5 mg, Nebulization, Q4H PRN  amLODIPine (NORVASC) tablet 5 mg, 5 mg, Oral, Daily  budesonide (PULMICORT) nebulizer suspension 250 mcg, 250 mcg, Nebulization, BID  omeprazole (PRILOSEC) delayed release capsule 40 mg, 40 mg, Oral, Daily  sodium chloride flush 0.9 % injection 5-40 mL, 5-40 mL, Intravenous, 2 times per day  sodium chloride flush 0.9 % injection 5-40 mL, 5-40 mL, Intravenous, PRN  0.9 % sodium chloride infusion, 25 mL, Intravenous, PRN  promethazine (PHENERGAN) tablet 12.5 mg, 12.5 mg, Oral, Q6H PRN **OR** ondansetron (ZOFRAN) injection 4 mg, 4 mg, Intravenous, Q6H PRN  polyethylene glycol (GLYCOLAX) packet 17 g, 17 g, Oral, Daily PRN  acetaminophen (TYLENOL) tablet 650 mg, 650 mg, Oral, Q6H PRN **OR** acetaminophen (TYLENOL) suppository 650 mg, 650 mg, Rectal, Q6H PRN  cefepime (MAXIPIME) 2000 mg IVPB minibag, 2,000 mg, Intravenous, Q8H  0.9 % sodium chloride infusion, , Intravenous, Continuous  vancomycin (VANCOCIN) intermittent dosing (placeholder), , Other, RX Placeholder  enoxaparin (LOVENOX) injection 30 mg, 30 mg, Subcutaneous, Daily  metronidazole (FLAGYL) 500 mg in NaCl 100 mL IVPB premix, 500 mg, Intravenous, Q8H  methylPREDNISolone sodium (SOLU-MEDROL) injection 60 mg, 60 mg, Intravenous, Q6H  ipratropium-albuterol (DUONEB) nebulizer solution 1 ampule, 1 ampule, Inhalation, Q2H  Arformoterol Tartrate (BROVANA) nebulizer solution 15 mcg, 15 mcg, Nebulization, BID  sodium chloride (Inhalant) 3 % nebulizer solution 15 mL, 15 mL, Nebulization, TID    Data reviewed:  Labs:  CBC:   Recent Labs     04/21/21 0614 04/22/21  0315   WBC 18.0* 17.4*   HGB 13.3* 10.7*   HCT 41.3 33.2*   .7* 102.3*    150     BMP:   Recent Labs     04/21/21  0614 04/21/21  0615 04/22/21  0315   NA  --  138 143   K  --  4.6 5.0   CL  --  94* 103   CO2  --  35* 31   PHOS 4.1  --   --    BUN  --  25* 28*   CREATININE  --  0.9 0.9     LIVER PROFILE:   Recent Labs     04/21/21 0615 04/22/21  0315   AST 23 341*   ALT 21 528*   LIPASE 16.0  --    BILIDIR <0.2  --    BILITOT 0.5 0.4   ALKPHOS 123 210*     PT/INR:   Recent Labs     04/21/21  0614   PROTIME 13.4*   INR 1.15*     APTT: No results for input(s): APTT in the last 72 hours.     Cultures:   Blood culture (4/21): NGTD  Nasal MRSA probe: Negative  Flu swab: Negative  COVID-19: Negative  Urine strep and legionella antigens: Pending       Films:  Chest images and reports were reviewed by me. My interpretation is:  CXR (4/21/21): Clear lung fields; gastric tube in place      Assessment:     Acute on chronic hypercarbic respiratory failure  Very severe COPD with acute exacerbation  Bilateral lower lobe pneumonia  Mucus plugging   Small bowel obstruction   Acute metabolic encephalopathy   Transaminitis       Plan:    Acute on chronic hypercarbic respiratory failure  -Due to pneumonia, mucus plugging and COPD  -Now on nasal cannula  -Repeat VBG     Very severe COPD with acute exacerbation  -Continue Solumedrol 60mg IV every 6 hours  -On cefepime, flagyl and vancomycin (day #2). Change antibiotics to levaquin  -Change duonebs to every 4 hours  -Budesonide and arformoterol nebulizers     Bilateral lower lobe pneumonia  -On cefepime, flagyl and vancomycin (day #2). Change antibiotics to levaquin  -Follow culture data     Mucus plugging   -Duonebs every 4 hours  -3% saline nebs     Small bowel obstruction   -General surgery following      Acute metabolic encephalopathy  -Resolved. Repeat VBG     Transaminitis   -Will discontinue cefepime and tylenol  -Check LFTs in a.m.       Prophylaxis  DVT- lovenox  GI- protonix     Patient is at high risk given the presence of pneumonia and COPD leading to acute on chronic respiratory failure.     Wilda Garcia MD  [facility] Pulmonary, Critical Care and Sleep

## 2021-04-22 NOTE — ACP (ADVANCE CARE PLANNING)
Advance Care Planning     Advance Care Planning Activator (Inpatient)  Conversation Note      Date of ACP Conversation: 4/22/2021    Conversation Conducted with: Patient with Decision Making Capacity    ACP Activator: Armond Freedman Decision Maker:     Current Designated Health Care Decision Maker:     Primary Decision Maker: Samir De Leon Jr - Phani - [phone]    Secondary Decision Maker: Guy Green - Niece/Nephew - [phone]  Click here to complete Healthcare Decision Makers including section of the Healthcare Decision Maker Relationship (ie \"Primary\")  Today we documented Decision Maker(s). The patient will provide ACP documents. Spiritual Services consult placed to complete a new HPOA. Care Preferences    Ventilation: \"If you were in your present state of health and suddenly became very ill and were unable to breathe on your own, what would your preference be about the use of a ventilator (breathing machine) if it were available to you? \"      Would the patient desire the use of ventilator (breathing machine)?: yes    \"If your health worsens and it becomes clear that your chance of recovery is unlikely, what would your preference be about the use of a ventilator (breathing machine) if it were available to you? \"     Would the patient desire the use of ventilator (breathing machine)?: No      Resuscitation  \"CPR works best to restart the heart when there is a sudden event, like a heart attack, in someone who is otherwise healthy. Unfortunately, CPR does not typically restart the heart for people who have serious health conditions or who are very sick. \"    \"In the event your heart stopped as a result of an underlying serious health condition, would you want attempts to be made to restart your heart (answer \"yes\" for attempt to resuscitate) or would you prefer a natural death (answer \"no\" for do not attempt to resuscitate)? \" yes       [] Yes   [x] No   Educated Patient / Vicentaedgar Rincon regarding differences between Advance Directives and portable DNR orders.     Length of ACP Conversation in minutes:  5    Conversation Outcomes:  [x] ACP discussion completed  [] Existing advance directive reviewed with patient; no changes to patient's previously recorded wishes  [x] New Advance Directive consult placed for Spiritual Services  [] Portable Do Not Rescitate prepared for Provider review and signature  [] POLST/POST/MOLST/MOST prepared for Provider review and signature      Follow-up plan:    [] Schedule follow-up conversation to continue planning  [] Referred individual to Provider for additional questions/concerns   [] Advised patient/agent/surrogate to review completed ACP document and update if needed with changes in condition, patient preferences or care setting    [x] This note routed to one or more involved healthcare providers              Barrington Shoemaker RN, BSN, Case Management  Phone: [phone]  Electronically signed by Barrington Shoemaker RN on 4/22/2021 at 4:30 PM

## 2021-04-22 NOTE — PROGRESS NOTES
@0245: Pt removed his NG. Stated he did so accidentally. Pt refusing for another NG.   @0230: Patient finally accept to be on BiPAP.    @0245: Pt stated he is anxious and can't sleep. This RN perfect served DR. Jacob Borrego. See new orders.

## 2021-04-23 LAB
ALBUMIN SERPL-MCNC: 3.8 G/DL (ref 3.4–5)
ALP BLD-CCNC: 176 U/L (ref 40–129)
ALT SERPL-CCNC: 333 U/L (ref 10–40)
ANION GAP SERPL CALCULATED.3IONS-SCNC: 9 MMOL/L (ref 3–16)
AST SERPL-CCNC: 107 U/L (ref 15–37)
BASE EXCESS VENOUS: 6 MMOL/L
BASOPHILS ABSOLUTE: 0 K/UL (ref 0–0.2)
BASOPHILS RELATIVE PERCENT: 0.2 %
BILIRUB SERPL-MCNC: 0.5 MG/DL (ref 0–1)
BILIRUBIN DIRECT: <0.2 MG/DL (ref 0–0.3)
BILIRUBIN, INDIRECT: ABNORMAL MG/DL (ref 0–1)
BUN BLDV-MCNC: 30 MG/DL (ref 7–20)
CALCIUM SERPL-MCNC: 8.9 MG/DL (ref 8.3–10.6)
CARBOXYHEMOGLOBIN: 1.2 %
CHLORIDE BLD-SCNC: 106 MMOL/L (ref 99–110)
CO2: 31 MMOL/L (ref 21–32)
CREAT SERPL-MCNC: 0.7 MG/DL (ref 0.8–1.3)
EOSINOPHILS ABSOLUTE: 0 K/UL (ref 0–0.6)
EOSINOPHILS RELATIVE PERCENT: 0 %
GFR AFRICAN AMERICAN: >60
GFR NON-AFRICAN AMERICAN: >60
GLUCOSE BLD-MCNC: 101 MG/DL (ref 70–99)
GLUCOSE BLD-MCNC: 113 MG/DL (ref 70–99)
HCO3 VENOUS: 34 MMOL/L (ref 23–29)
HCT VFR BLD CALC: 33.3 % (ref 40.5–52.5)
HEMOGLOBIN: 10.5 G/DL (ref 13.5–17.5)
LYMPHOCYTES ABSOLUTE: 0.3 K/UL (ref 1–5.1)
LYMPHOCYTES RELATIVE PERCENT: 1.9 %
MAGNESIUM: 2.7 MG/DL (ref 1.8–2.4)
MCH RBC QN AUTO: 32 PG (ref 26–34)
MCHC RBC AUTO-ENTMCNC: 31.4 G/DL (ref 31–36)
MCV RBC AUTO: 101.7 FL (ref 80–100)
METHEMOGLOBIN VENOUS: 0.4 %
MONOCYTES ABSOLUTE: 0.5 K/UL (ref 0–1.3)
MONOCYTES RELATIVE PERCENT: 3.1 %
NEUTROPHILS ABSOLUTE: 15 K/UL (ref 1.7–7.7)
NEUTROPHILS RELATIVE PERCENT: 94.8 %
O2 CONTENT, VEN: 12 ML/DL
O2 SAT, VEN: 78 %
O2 THERAPY: ABNORMAL
PCO2, VEN: 63.4 MMHG (ref 40–50)
PDW BLD-RTO: 14.1 % (ref 12.4–15.4)
PERFORMED ON: ABNORMAL
PH VENOUS: 7.33 (ref 7.35–7.45)
PHOSPHORUS: 2.8 MG/DL (ref 2.5–4.9)
PLATELET # BLD: 173 K/UL (ref 135–450)
PMV BLD AUTO: 8.3 FL (ref 5–10.5)
PO2, VEN: 43 MMHG
POTASSIUM SERPL-SCNC: 4.7 MMOL/L (ref 3.5–5.1)
PROCALCITONIN: 3.93 NG/ML (ref 0–0.15)
RBC # BLD: 3.27 M/UL (ref 4.2–5.9)
SODIUM BLD-SCNC: 146 MMOL/L (ref 136–145)
TCO2 CALC VENOUS: 36 MMOL/L
TOTAL PROTEIN: 6.5 G/DL (ref 6.4–8.2)
WBC # BLD: 15.8 K/UL (ref 4–11)

## 2021-04-23 PROCEDURE — 6360000002 HC RX W HCPCS: Performed by: INTERNAL MEDICINE

## 2021-04-23 PROCEDURE — 2060000000 HC ICU INTERMEDIATE R&B

## 2021-04-23 PROCEDURE — 84100 ASSAY OF PHOSPHORUS: CPT

## 2021-04-23 PROCEDURE — 2700000000 HC OXYGEN THERAPY PER DAY

## 2021-04-23 PROCEDURE — APPSS15 APP SPLIT SHARED TIME 0-15 MINUTES: Performed by: NURSE PRACTITIONER

## 2021-04-23 PROCEDURE — 84145 PROCALCITONIN (PCT): CPT

## 2021-04-23 PROCEDURE — 82803 BLOOD GASES ANY COMBINATION: CPT

## 2021-04-23 PROCEDURE — 99233 SBSQ HOSP IP/OBS HIGH 50: CPT | Performed by: INTERNAL MEDICINE

## 2021-04-23 PROCEDURE — 94761 N-INVAS EAR/PLS OXIMETRY MLT: CPT

## 2021-04-23 PROCEDURE — APPNB15 APP NON BILLABLE TIME 0-15 MINS: Performed by: NURSE PRACTITIONER

## 2021-04-23 PROCEDURE — 36415 COLL VENOUS BLD VENIPUNCTURE: CPT

## 2021-04-23 PROCEDURE — 2580000003 HC RX 258: Performed by: INTERNAL MEDICINE

## 2021-04-23 PROCEDURE — 94640 AIRWAY INHALATION TREATMENT: CPT

## 2021-04-23 PROCEDURE — 6370000000 HC RX 637 (ALT 250 FOR IP): Performed by: NURSE PRACTITIONER

## 2021-04-23 PROCEDURE — 83735 ASSAY OF MAGNESIUM: CPT

## 2021-04-23 PROCEDURE — 85025 COMPLETE CBC W/AUTO DIFF WBC: CPT

## 2021-04-23 PROCEDURE — 2580000003 HC RX 258: Performed by: NURSE PRACTITIONER

## 2021-04-23 PROCEDURE — 80048 BASIC METABOLIC PNL TOTAL CA: CPT

## 2021-04-23 PROCEDURE — 2500000003 HC RX 250 WO HCPCS: Performed by: INTERNAL MEDICINE

## 2021-04-23 PROCEDURE — 99232 SBSQ HOSP IP/OBS MODERATE 35: CPT | Performed by: SURGERY

## 2021-04-23 PROCEDURE — 2500000003 HC RX 250 WO HCPCS: Performed by: NURSE PRACTITIONER

## 2021-04-23 PROCEDURE — 6360000002 HC RX W HCPCS: Performed by: NURSE PRACTITIONER

## 2021-04-23 PROCEDURE — 80076 HEPATIC FUNCTION PANEL: CPT

## 2021-04-23 RX ORDER — MORPHINE SULFATE 2 MG/ML
2 INJECTION, SOLUTION INTRAMUSCULAR; INTRAVENOUS EVERY 4 HOURS PRN
Status: DISCONTINUED | OUTPATIENT
Start: 2021-04-23 | End: 2021-05-04 | Stop reason: HOSPADM

## 2021-04-23 RX ORDER — METOPROLOL TARTRATE 5 MG/5ML
5 INJECTION INTRAVENOUS EVERY 6 HOURS
Status: DISCONTINUED | OUTPATIENT
Start: 2021-04-23 | End: 2021-04-29

## 2021-04-23 RX ORDER — METHYLPREDNISOLONE SODIUM SUCCINATE 40 MG/ML
40 INJECTION, POWDER, LYOPHILIZED, FOR SOLUTION INTRAMUSCULAR; INTRAVENOUS EVERY 8 HOURS
Status: DISCONTINUED | OUTPATIENT
Start: 2021-04-23 | End: 2021-04-24

## 2021-04-23 RX ADMIN — BUDESONIDE 250 MCG: 0.25 SUSPENSION RESPIRATORY (INHALATION) at 07:49

## 2021-04-23 RX ADMIN — ENOXAPARIN SODIUM 50 MG: 60 INJECTION SUBCUTANEOUS at 14:53

## 2021-04-23 RX ADMIN — IPRATROPIUM BROMIDE AND ALBUTEROL SULFATE 1 AMPULE: .5; 3 SOLUTION RESPIRATORY (INHALATION) at 15:51

## 2021-04-23 RX ADMIN — ARFORMOTEROL TARTRATE 15 MCG: 15 SOLUTION RESPIRATORY (INHALATION) at 20:23

## 2021-04-23 RX ADMIN — METHYLPREDNISOLONE SODIUM SUCCINATE 40 MG: 40 INJECTION, POWDER, FOR SOLUTION INTRAMUSCULAR; INTRAVENOUS at 14:53

## 2021-04-23 RX ADMIN — MORPHINE SULFATE 2 MG: 2 INJECTION, SOLUTION INTRAMUSCULAR; INTRAVENOUS at 14:53

## 2021-04-23 RX ADMIN — IPRATROPIUM BROMIDE AND ALBUTEROL SULFATE 1 AMPULE: .5; 3 SOLUTION RESPIRATORY (INHALATION) at 11:34

## 2021-04-23 RX ADMIN — METHYLPREDNISOLONE SODIUM SUCCINATE 40 MG: 40 INJECTION, POWDER, FOR SOLUTION INTRAMUSCULAR; INTRAVENOUS at 20:15

## 2021-04-23 RX ADMIN — MORPHINE SULFATE 2 MG: 2 INJECTION, SOLUTION INTRAMUSCULAR; INTRAVENOUS at 20:20

## 2021-04-23 RX ADMIN — Medication 10 ML: at 09:22

## 2021-04-23 RX ADMIN — METOPROLOL TARTRATE 5 MG: 1 INJECTION, SOLUTION INTRAVENOUS at 21:00

## 2021-04-23 RX ADMIN — LEVOFLOXACIN 500 MG: 5 INJECTION, SOLUTION INTRAVENOUS at 09:33

## 2021-04-23 RX ADMIN — FAMOTIDINE 20 MG: 10 INJECTION, SOLUTION INTRAVENOUS at 09:21

## 2021-04-23 RX ADMIN — IPRATROPIUM BROMIDE AND ALBUTEROL SULFATE 1 AMPULE: .5; 3 SOLUTION RESPIRATORY (INHALATION) at 07:49

## 2021-04-23 RX ADMIN — ARFORMOTEROL TARTRATE 15 MCG: 15 SOLUTION RESPIRATORY (INHALATION) at 07:49

## 2021-04-23 RX ADMIN — BUDESONIDE 250 MCG: 0.25 SUSPENSION RESPIRATORY (INHALATION) at 20:24

## 2021-04-23 RX ADMIN — ENOXAPARIN SODIUM 30 MG: 30 INJECTION SUBCUTANEOUS at 09:22

## 2021-04-23 RX ADMIN — FAMOTIDINE 20 MG: 10 INJECTION, SOLUTION INTRAVENOUS at 20:15

## 2021-04-23 RX ADMIN — SODIUM CHLORIDE SOLN NEBU 3% 15 ML: 3 NEBU SOLN at 11:34

## 2021-04-23 RX ADMIN — Medication 10 ML: at 20:16

## 2021-04-23 RX ADMIN — METOPROLOL TARTRATE 5 MG: 1 INJECTION, SOLUTION INTRAVENOUS at 18:09

## 2021-04-23 RX ADMIN — METHYLPREDNISOLONE SODIUM SUCCINATE 60 MG: 125 INJECTION, POWDER, FOR SOLUTION INTRAMUSCULAR; INTRAVENOUS at 03:30

## 2021-04-23 RX ADMIN — ENOXAPARIN SODIUM 50 MG: 60 INJECTION SUBCUTANEOUS at 20:16

## 2021-04-23 RX ADMIN — MORPHINE SULFATE 2 MG: 2 INJECTION, SOLUTION INTRAMUSCULAR; INTRAVENOUS at 10:04

## 2021-04-23 RX ADMIN — SODIUM CHLORIDE SOLN NEBU 3% 15 ML: 3 NEBU SOLN at 07:50

## 2021-04-23 RX ADMIN — IPRATROPIUM BROMIDE AND ALBUTEROL SULFATE 1 AMPULE: .5; 3 SOLUTION RESPIRATORY (INHALATION) at 20:23

## 2021-04-23 RX ADMIN — SODIUM CHLORIDE SOLN NEBU 3% 15 ML: 3 NEBU SOLN at 20:28

## 2021-04-23 ASSESSMENT — PAIN DESCRIPTION - PROGRESSION
CLINICAL_PROGRESSION: GRADUALLY WORSENING

## 2021-04-23 ASSESSMENT — PAIN DESCRIPTION - FREQUENCY
FREQUENCY: CONTINUOUS

## 2021-04-23 ASSESSMENT — PAIN - FUNCTIONAL ASSESSMENT
PAIN_FUNCTIONAL_ASSESSMENT: PREVENTS OR INTERFERES SOME ACTIVE ACTIVITIES AND ADLS

## 2021-04-23 ASSESSMENT — PAIN DESCRIPTION - ONSET: ONSET: ON-GOING

## 2021-04-23 ASSESSMENT — PAIN SCALES - GENERAL
PAINLEVEL_OUTOF10: 7
PAINLEVEL_OUTOF10: 5
PAINLEVEL_OUTOF10: 4
PAINLEVEL_OUTOF10: 5
PAINLEVEL_OUTOF10: 4

## 2021-04-23 ASSESSMENT — PAIN DESCRIPTION - LOCATION: LOCATION: ABDOMEN

## 2021-04-23 ASSESSMENT — PAIN DESCRIPTION - PAIN TYPE: TYPE: ACUTE PAIN

## 2021-04-23 NOTE — PLAN OF CARE
Problem: Nutrition  Goal: Optimal nutrition therapy  4/23/2021 0939 by Starr Hollis RD, LD  Outcome: Ongoing  4/22/2021 2306 by Kaiser Kline RN  Outcome: Ongoing   Nutrition Problem #1: Severe malnutrition  Intervention: Food and/or Nutrient Delivery: Continue NPO, Start Oral Nutrition Supplement(diet advancement per surgery)  Nutritional Goals:  Tolerate the most appropriate form of nutrition per provider

## 2021-04-23 NOTE — PROGRESS NOTES
[facility] [phone]                                     Daily Progress Note                                                         Pt Name: Sindhu Urbina Day  Medical Record Number: [id number]  Date of Birth 1945   Today's Date: 4/23/2021  CC: SBO vs ileus    ASSESSMENT/PLAN  77 yo male with severe COPD, bilateral LL pneumonia, SBO vs ileus     SBO vs ileus - Pt pulled out NG 4/22 middle of the night and refused replacement. Continue IV hydration. Poor surgical candidate. Passing flatus but more distended today. Had BM after suppository yesterday. Minimal tenderness. OK for clear liquids.      Severe COPD, bilateral LL pneumonia, hypercarbia - BIPAP per pulmonology. On vancomycin, cefepime, flagyl. Steroids per pulmonology     Hx of HTN, weight loss, deconditioning - per primary team.     Severe malnutrition - will start supplements when tolerating diet       SUBJECTIVE  Sindhu Urbina is more distended today. Denies nausea but burping a lot. Passing flatus, had small BM after suppository 4/22. OBJECTIVE  VITALS:  height is 5' 7\" (1.702 m) and weight is 111 lb 1.8 oz (50.4 kg). His oral temperature is 98.7 °F (37.1 °C). His blood pressure is 171/86 (abnormal) and his pulse is 108. His respiration is 14 and oxygen saturation is 98%. INTAKE/OUTPUT:      Intake/Output Summary (Last 24 hours) at 4/23/2021 0950  Last data filed at 4/23/2021 0600  Gross per 24 hour   Intake 2393 ml   Output 2375 ml   Net 18 ml     GENERAL: alert, cooperative, no distress  LUNGS: clear to ausculation, without wheezes, rales or rhonci  HEART: normal rate and regular rhythm  ABDOMEN: More distended, minimal generalized tenderness without peritonitis. Scars consistent with surgical history. EXTREMITY: no cyanosis, clubbing or edema    I/O last 3 completed shifts:   In: 2393 [I.V.:2043; IV Piggyback:350]  Out: 2465 [Urine:2375]      LABS  Recent Labs     04/21/21

## 2021-04-23 NOTE — PLAN OF CARE
Problem: Pain:  Goal: Pain level will decrease  Description: Pain level will decrease  Outcome: Ongoing  Goal: Control of acute pain  Description: Control of acute pain  Outcome: Ongoing  Goal: Control of chronic pain  Description: Control of chronic pain  Outcome: Ongoing  Goal: Patient's pain/discomfort is manageable  Description: Patient's pain/discomfort is manageable  Outcome: Ongoing     Problem: Nutrition  Goal: Optimal nutrition therapy  Outcome: Ongoing     Problem: Infection:  Goal: Will remain free from infection  Description: Will remain free from infection  Outcome: Ongoing     Problem: Safety:  Goal: Free from accidental physical injury  Description: Free from accidental physical injury  Outcome: Ongoing  Goal: Free from intentional harm  Description: Free from intentional harm  Outcome: Ongoing     Problem: Daily Care:  Goal: Daily care needs are met  Description: Daily care needs are met  Outcome: Ongoing     Problem: Skin Integrity:  Goal: Skin integrity will stabilize  Description: Skin integrity will stabilize  Outcome: Ongoing     Problem: Discharge Planning:  Goal: Patients continuum of care needs are met  Description: Patients continuum of care needs are met  Outcome: Ongoing     Problem: Skin Integrity:  Goal: Will show no infection signs and symptoms  Description: Will show no infection signs and symptoms  Outcome: Ongoing  Goal: Absence of new skin breakdown  Description: Absence of new skin breakdown  Outcome: Ongoing     Problem: Falls - Risk of:  Goal: Will remain free from falls  Description: Will remain free from falls  Outcome: Ongoing  Goal: Absence of physical injury  Description: Absence of physical injury  Outcome: Ongoing

## 2021-04-23 NOTE — PROGRESS NOTES
150 173     Recent Labs     04/21/21  0614 04/21/21  0615 04/22/21  0315 04/23/21  0320   NA  --  138 143 146*   K  --  4.6 5.0 4.7   CL  --  94* 103 106   CO2  --  35* 31 31   BUN  --  25* 28* 30*   CREATININE  --  0.9 0.9 0.7*   CALCIUM  --  9.4 8.0* 8.9   PHOS 4.1  --  4.0 2.8     Recent Labs     04/21/21  0615 04/22/21  0315 04/23/21  0320   AST 23 341* 107*   ALT 21 528* 333*   BILIDIR <0.2  --  <0.2   BILITOT 0.5 0.4 0.5   ALKPHOS 123 210* 176*     Recent Labs     04/21/21  0614   INR 1.15*     No results for input(s): Andrez Ceja in the last 72 hours. Urinalysis:      Lab Results   Component Value Date    NITRU Negative 04/21/2021    WBCUA 7 04/21/2021    BACTERIA 1+ 06/17/2014    RBCUA 2 04/21/2021    RBCUA 2 11/19/2011    BLOODU TRACE 04/21/2021    SPECGRAV >1.030 04/21/2021    GLUCOSEU Negative 04/21/2021       Radiology:  US ABDOMEN LIMITED   Final Result   Normal sonographic appearance the liver. Marked extrahepatic biliary   dilatation without significant intrahepatic biliary dilatation. Dilatation   bleed related to prior cholecystectomy and patient's age. No acute   abnormality. XR ABDOMEN (KUB) (SINGLE AP VIEW)   Final Result   Nasogastric tube projects to the proximal stomach, normal position. Persistent dilated loops of small bowel in the upper abdomen. XR CHEST PORTABLE   Final Result   No acute cardiac or pulmonary disease. NG tube extends into the stomach. CTA ABDOMEN PELVIS W CONTRAST   Final Result   1. Persisting diffuse small bowel dilatation with fluid-filled lumen   consistent with small bowel obstruction proximal to suggestion of focal   mechanical transition point at the central abdomen/right lower quadrant   possibly related to internal hernia. 2. Redemonstration of infrarenal fusiform abdominal aortic aneurysm with   lumen measuring up to 4.4 cm in greatest diameter, as discussed above.    3. Severe atherosclerotic disease involving the bilateral common, internal   and external iliac arterial vasculature and branches with associated   multifocal high-grade stenosis. 4. Celiac trunk origin focal high-grade stenosis secondary to marked   atherosclerotic disease. 5. Severe sigmoid colon diverticulosis without evidence of diverticulitis. 6. Bilateral lower lung lobe scattered \"tree-in-bud\" opacification pattern   consistent with bronchiolitis/small airway disease with posterior lower lung   lobe multifocal mild consolidation consistent with pneumonia. 7. Cholecystectomy with marked reservoir effect, as discussed above. 8. Splenic chronic granulomatous disease. 9. Mild diffuse distention of esophagus with fluid-filled lumen suggesting   association with gastroesophageal reflux. Recommend clinical correlation. 10. Symmetric bilateral renal cortical volume loss suggesting association   with senescent change versus chronic medical renal disease. 11. Redemonstration of suspected left adrenal gland adenoma. RECOMMENDATIONS:   4.4 cm infrarenal abdominal aortic aneurysm. Recommend follow-up every 12   months and vascular consultation. Reference: J Am Sophie Radiol 7997;51:685-830. CT CHEST WO CONTRAST   Final Result   1. Persisting diffuse small bowel dilatation with fluid-filled lumen   consistent with small bowel obstruction proximal to suggestion of focal   mechanical transition point at the central abdomen/right lower quadrant   possibly related to internal hernia. 2. Redemonstration of infrarenal fusiform abdominal aortic aneurysm with   lumen measuring up to 4.4 cm in greatest diameter, as discussed above. 3. Severe atherosclerotic disease involving the bilateral common, internal   and external iliac arterial vasculature and branches with associated   multifocal high-grade stenosis. 4. Celiac trunk origin focal high-grade stenosis secondary to marked   atherosclerotic disease.    5. Severe sigmoid colon

## 2021-04-23 NOTE — PROGRESS NOTES
Comprehensive Nutrition Assessment    Type and Reason for Visit:  Reassess    Nutrition Recommendations/Plan:   Monitor for diet advancement per surgery. Start ONS when diet advances. Pt meets criteria for Severe Malnutrition diagnosed based on severe fat and muscle wasting (orbital, tricep, clavicles, temples, etc). Nutrition Assessment:  Follow-up. Pt had NG but pulled out over night yesterday and refused replacement. Surgery let pt trial clears yesterday but pt reports it upsetting his stomach. Will await recommendations from surgery regarding nutrition. Suspect wt on admission was inaccurate as wt now is 111# but BMI remains low. Malnutrition Assessment:  Malnutrition Status:  Severe malnutrition    Context:  Chronic Illness     Findings of the 6 clinical characteristics of malnutrition:  Energy Intake:  Mild decrease in energy intake   Weight Loss:  Unable to assess     Body Fat Loss:  7 - Severe body fat loss Buccal region, Orbital, Triceps   Muscle Mass Loss:  7 - Severe muscle mass loss Temples (temporalis), Clavicles (pectoralis & deltoids), Hand (interosseous)  Fluid Accumulation:  No significant fluid accumulation     Strength:  Not Performed    Estimated Daily Nutrient Needs:  Energy (kcal):  0039-5113 kcal (30-35 kcal/kg CBW- underweight criteria)    Protein (g):  75 gm (2gm/kg CBW-underweight criteria)     Fluid (ml/day):  Per MD;    Nutrition Related Findings:  BM 4/22;  Na 146, Glu 101      Wounds:  None       Current Nutrition Therapies:    Diet NPO Effective Now    Anthropometric Measures:  · Height: 5' 7\" (170.2 cm)  · Current Body Weight: 111 lb (50.3 kg)   · Admission Body Weight: 88 lb (39.9 kg)    · Ideal Body Weight: 148 lbs; % Ideal Body Weight 75 %   · BMI: 17.4  · BMI Categories: Underweight (BMI less than 22) age over 72       Nutrition Diagnosis:   · Severe malnutrition related to inadequate protein-energy intake as evidenced by severe loss of subcutaneous fat, severe muscle loss(BMI of 13.7)      Nutrition Interventions:   Food and/or Nutrient Delivery:  Continue NPO, Start Oral Nutrition Supplement(diet advancement per surgery)  Nutrition Education/Counseling:  No recommendation at this time   Coordination of Nutrition Care:  Continue to monitor while inpatient    Goals: Tolerate the most appropriate form of nutrition per provider       Nutrition Monitoring and Evaluation:   Food/Nutrient Intake Outcomes:  (Nutrition advancement)  Physical Signs/Symptoms Outcomes:  Biochemical Data, Weight, Skin, Nutrition Focused Physical Findings, Nausea or Vomiting     Discharge Planning:     Too soon to determine     Electronically signed by Michaela Lane RD, LD on 4/23/21 at 9:36 AM EDT    Contact: [phone]

## 2021-04-23 NOTE — PROGRESS NOTES
Pulmonary Progress Note    CC: Acute on chronic hypercarbic respiratory failure  Very severe COPD with acute exacerbation  Bilateral lower lobe pneumonia  Mucus plugging   Small bowel obstruction   Acute metabolic encephalopathy   Transaminitis     24 hr events: On nasal cannula. Had clear liquids yesterday, but developed abdominal pain. Had 2.3L of urine output in the past 24 hours. ROS:  +intermittent SOB  +cough  No vomiting     PHYSICAL EXAM:  Blood pressure (!) 164/74, pulse 106, temperature 98.4 °F (36.9 °C), temperature source Oral, resp. rate 14, height 5' 7\" (1.702 m), weight 111 lb 1.8 oz (50.4 kg), SpO2 98 %. on 3L NC    Intake/Output Summary (Last 24 hours) at 4/23/2021 0747  Last data filed at 4/23/2021 0600  Gross per 24 hour   Intake 2393 ml   Output 2375 ml   Net 18 ml       Gen: Well developed; thin  Eyes: No scleral icterus. No conjunctival injection. ENT:  External appearance of ears and nose normal.  Neck: Trachea midline. No obvious mass. No visible thyroid enlargement    Respiratory: Diffusely decreased breath sounds with crackles over bilateral lower lung zones, no accessory muscle use  Cardiovascular: Regular rate and rhythm, no appreciable murmurs. No edema. Gastrointestinal: Distended, mildly TTP. No hernia  Skin: Warm and dry. No rashes or ulcers on visible areas. Normal texture and turgor  Musculoskeletal: No cyanosis, clubbing or joint deformity.     Psychiatric: Normal mood and affect; exhibits normal insight and judgement     Medications:  Current Facility-Administered Medications: levoFLOXacin (LEVAQUIN) 500 MG/100ML infusion 500 mg, 500 mg, Intravenous, Q24H  ipratropium-albuterol (DUONEB) nebulizer solution 1 ampule, 1 ampule, Inhalation, Q4H  famotidine (PEPCID) injection 20 mg, 20 mg, Intravenous, BID  albuterol sulfate  (90 Base) MCG/ACT inhaler 2 puff, 2 puff, Inhalation, Q6H PRN  albuterol (PROVENTIL) nebulizer solution 2.5 mg, 2.5 mg, Nebulization, Q4H PRN hypercarbic respiratory failure  Very severe COPD with acute exacerbation  Bilateral lower lobe pneumonia  Mucus plugging   Small bowel obstruction   Acute metabolic encephalopathy   Transaminitis       Plan:    Acute on chronic hypercarbic respiratory failure  -Due to pneumonia, mucus plugging and COPD  -Continue supplemental oxygen to keep saturation>90%     Very severe COPD with acute exacerbation  -Decrease Solumedrol to 40mg IV every 8 hours  -Levaquin (day #2)  -Duonebs every 4 hours  -Budesonide and arformoterol nebulizers     Bilateral lower lobe pneumonia  -Levaquin (day #2)     Mucus plugging   -Duonebs every 4 hours  -3% saline nebs 3 times daily      Small bowel obstruction   -Per general surgery      Acute metabolic encephalopathy  -Resolved     Transaminitis   -Cefepime and tylenol discontinued  -Check daily labs        Prophylaxis  DVT- lovenox  GI- pepcid     Patient is at high risk given the presence of pneumonia and COPD leading to acute on chronic respiratory failure.     Moncho Sommers MD  [facility] Pulmonary, Critical Care and Sleep

## 2021-04-24 LAB
ALBUMIN SERPL-MCNC: 3.9 G/DL (ref 3.4–5)
ALP BLD-CCNC: 161 U/L (ref 40–129)
ALT SERPL-CCNC: 245 U/L (ref 10–40)
ANION GAP SERPL CALCULATED.3IONS-SCNC: 7 MMOL/L (ref 3–16)
ANION GAP SERPL CALCULATED.3IONS-SCNC: 9 MMOL/L (ref 3–16)
AST SERPL-CCNC: 44 U/L (ref 15–37)
BASOPHILS ABSOLUTE: 0 K/UL (ref 0–0.2)
BASOPHILS RELATIVE PERCENT: 0 %
BILIRUB SERPL-MCNC: 0.5 MG/DL (ref 0–1)
BILIRUBIN DIRECT: <0.2 MG/DL (ref 0–0.3)
BILIRUBIN, INDIRECT: ABNORMAL MG/DL (ref 0–1)
BUN BLDV-MCNC: 36 MG/DL (ref 7–20)
BUN BLDV-MCNC: 41 MG/DL (ref 7–20)
CALCIUM SERPL-MCNC: 8.9 MG/DL (ref 8.3–10.6)
CALCIUM SERPL-MCNC: 9.3 MG/DL (ref 8.3–10.6)
CHLORIDE BLD-SCNC: 106 MMOL/L (ref 99–110)
CHLORIDE BLD-SCNC: 110 MMOL/L (ref 99–110)
CO2: 32 MMOL/L (ref 21–32)
CO2: 33 MMOL/L (ref 21–32)
CREAT SERPL-MCNC: 0.7 MG/DL (ref 0.8–1.3)
CREAT SERPL-MCNC: 0.8 MG/DL (ref 0.8–1.3)
EOSINOPHILS ABSOLUTE: 0 K/UL (ref 0–0.6)
EOSINOPHILS RELATIVE PERCENT: 0 %
GFR AFRICAN AMERICAN: >60
GFR AFRICAN AMERICAN: >60
GFR NON-AFRICAN AMERICAN: >60
GFR NON-AFRICAN AMERICAN: >60
GLUCOSE BLD-MCNC: 105 MG/DL (ref 70–99)
GLUCOSE BLD-MCNC: 109 MG/DL (ref 70–99)
GLUCOSE BLD-MCNC: 112 MG/DL (ref 70–99)
HCT VFR BLD CALC: 33.4 % (ref 40.5–52.5)
HEMOGLOBIN: 10.7 G/DL (ref 13.5–17.5)
LYMPHOCYTES ABSOLUTE: 0.3 K/UL (ref 1–5.1)
LYMPHOCYTES RELATIVE PERCENT: 3.6 %
MAGNESIUM: 2.9 MG/DL (ref 1.8–2.4)
MCH RBC QN AUTO: 32.6 PG (ref 26–34)
MCHC RBC AUTO-ENTMCNC: 32.1 G/DL (ref 31–36)
MCV RBC AUTO: 101.6 FL (ref 80–100)
MONOCYTES ABSOLUTE: 0.4 K/UL (ref 0–1.3)
MONOCYTES RELATIVE PERCENT: 4.5 %
NEUTROPHILS ABSOLUTE: 7.9 K/UL (ref 1.7–7.7)
NEUTROPHILS RELATIVE PERCENT: 91.9 %
PDW BLD-RTO: 14 % (ref 12.4–15.4)
PERFORMED ON: ABNORMAL
PHOSPHORUS: 3.6 MG/DL (ref 2.5–4.9)
PLATELET # BLD: 175 K/UL (ref 135–450)
PMV BLD AUTO: 8.4 FL (ref 5–10.5)
POTASSIUM SERPL-SCNC: 4.5 MMOL/L (ref 3.5–5.1)
POTASSIUM SERPL-SCNC: 4.6 MMOL/L (ref 3.5–5.1)
PROCALCITONIN: 2.14 NG/ML (ref 0–0.15)
RBC # BLD: 3.28 M/UL (ref 4.2–5.9)
SODIUM BLD-SCNC: 146 MMOL/L (ref 136–145)
SODIUM BLD-SCNC: 151 MMOL/L (ref 136–145)
TOTAL PROTEIN: 6.8 G/DL (ref 6.4–8.2)
WBC # BLD: 8.6 K/UL (ref 4–11)

## 2021-04-24 PROCEDURE — 94761 N-INVAS EAR/PLS OXIMETRY MLT: CPT

## 2021-04-24 PROCEDURE — 85025 COMPLETE CBC W/AUTO DIFF WBC: CPT

## 2021-04-24 PROCEDURE — 6360000002 HC RX W HCPCS: Performed by: INTERNAL MEDICINE

## 2021-04-24 PROCEDURE — 2580000003 HC RX 258: Performed by: INTERNAL MEDICINE

## 2021-04-24 PROCEDURE — 6360000002 HC RX W HCPCS: Performed by: NURSE PRACTITIONER

## 2021-04-24 PROCEDURE — 84145 PROCALCITONIN (PCT): CPT

## 2021-04-24 PROCEDURE — 6370000000 HC RX 637 (ALT 250 FOR IP): Performed by: NURSE PRACTITIONER

## 2021-04-24 PROCEDURE — 80076 HEPATIC FUNCTION PANEL: CPT

## 2021-04-24 PROCEDURE — 83735 ASSAY OF MAGNESIUM: CPT

## 2021-04-24 PROCEDURE — 36569 INSJ PICC 5 YR+ W/O IMAGING: CPT

## 2021-04-24 PROCEDURE — 2500000003 HC RX 250 WO HCPCS: Performed by: INTERNAL MEDICINE

## 2021-04-24 PROCEDURE — 6370000000 HC RX 637 (ALT 250 FOR IP): Performed by: INTERNAL MEDICINE

## 2021-04-24 PROCEDURE — 99232 SBSQ HOSP IP/OBS MODERATE 35: CPT | Performed by: INTERNAL MEDICINE

## 2021-04-24 PROCEDURE — 94660 CPAP INITIATION&MGMT: CPT

## 2021-04-24 PROCEDURE — APPSS15 APP SPLIT SHARED TIME 0-15 MINUTES: Performed by: PHYSICIAN ASSISTANT

## 2021-04-24 PROCEDURE — 2060000000 HC ICU INTERMEDIATE R&B

## 2021-04-24 PROCEDURE — 2580000003 HC RX 258: Performed by: NURSE PRACTITIONER

## 2021-04-24 PROCEDURE — 80048 BASIC METABOLIC PNL TOTAL CA: CPT

## 2021-04-24 PROCEDURE — 84100 ASSAY OF PHOSPHORUS: CPT

## 2021-04-24 PROCEDURE — 2500000003 HC RX 250 WO HCPCS: Performed by: NURSE PRACTITIONER

## 2021-04-24 PROCEDURE — APPNB30 APP NON BILLABLE TIME 0-30 MINS: Performed by: PHYSICIAN ASSISTANT

## 2021-04-24 PROCEDURE — C1751 CATH, INF, PER/CENT/MIDLINE: HCPCS

## 2021-04-24 PROCEDURE — 05HY33Z INSERTION OF INFUSION DEVICE INTO UPPER VEIN, PERCUTANEOUS APPROACH: ICD-10-PCS | Performed by: FAMILY MEDICINE

## 2021-04-24 PROCEDURE — 94640 AIRWAY INHALATION TREATMENT: CPT

## 2021-04-24 PROCEDURE — 76937 US GUIDE VASCULAR ACCESS: CPT

## 2021-04-24 PROCEDURE — 2700000000 HC OXYGEN THERAPY PER DAY

## 2021-04-24 RX ORDER — METHYLPREDNISOLONE SODIUM SUCCINATE 40 MG/ML
40 INJECTION, POWDER, LYOPHILIZED, FOR SOLUTION INTRAMUSCULAR; INTRAVENOUS DAILY
Status: DISCONTINUED | OUTPATIENT
Start: 2021-04-25 | End: 2021-04-25

## 2021-04-24 RX ORDER — LIDOCAINE HYDROCHLORIDE 10 MG/ML
5 INJECTION, SOLUTION EPIDURAL; INFILTRATION; INTRACAUDAL; PERINEURAL ONCE
Status: COMPLETED | OUTPATIENT
Start: 2021-04-24 | End: 2021-04-24

## 2021-04-24 RX ORDER — DEXTROSE MONOHYDRATE 50 MG/ML
INJECTION, SOLUTION INTRAVENOUS CONTINUOUS
Status: DISCONTINUED | OUTPATIENT
Start: 2021-04-24 | End: 2021-04-24

## 2021-04-24 RX ORDER — DEXTROSE AND SODIUM CHLORIDE 5; .45 G/100ML; G/100ML
INJECTION, SOLUTION INTRAVENOUS CONTINUOUS
Status: DISCONTINUED | OUTPATIENT
Start: 2021-04-24 | End: 2021-05-04 | Stop reason: HOSPADM

## 2021-04-24 RX ORDER — SODIUM CHLORIDE 0.9 % (FLUSH) 0.9 %
5-40 SYRINGE (ML) INJECTION PRN
Status: DISCONTINUED | OUTPATIENT
Start: 2021-04-24 | End: 2021-05-04 | Stop reason: HOSPADM

## 2021-04-24 RX ORDER — SODIUM CHLORIDE 9 MG/ML
25 INJECTION, SOLUTION INTRAVENOUS PRN
Status: DISCONTINUED | OUTPATIENT
Start: 2021-04-24 | End: 2021-05-04 | Stop reason: HOSPADM

## 2021-04-24 RX ORDER — SODIUM CHLORIDE 0.9 % (FLUSH) 0.9 %
5-40 SYRINGE (ML) INJECTION EVERY 12 HOURS SCHEDULED
Status: DISCONTINUED | OUTPATIENT
Start: 2021-04-24 | End: 2021-05-04 | Stop reason: HOSPADM

## 2021-04-24 RX ADMIN — MORPHINE SULFATE 2 MG: 2 INJECTION, SOLUTION INTRAMUSCULAR; INTRAVENOUS at 19:36

## 2021-04-24 RX ADMIN — MORPHINE SULFATE 2 MG: 2 INJECTION, SOLUTION INTRAMUSCULAR; INTRAVENOUS at 08:26

## 2021-04-24 RX ADMIN — MORPHINE SULFATE 2 MG: 2 INJECTION, SOLUTION INTRAMUSCULAR; INTRAVENOUS at 04:20

## 2021-04-24 RX ADMIN — SODIUM CHLORIDE SOLN NEBU 3% 15 ML: 3 NEBU SOLN at 08:29

## 2021-04-24 RX ADMIN — IPRATROPIUM BROMIDE AND ALBUTEROL SULFATE 1 AMPULE: .5; 3 SOLUTION RESPIRATORY (INHALATION) at 08:30

## 2021-04-24 RX ADMIN — LEVOFLOXACIN 500 MG: 5 INJECTION, SOLUTION INTRAVENOUS at 10:11

## 2021-04-24 RX ADMIN — IPRATROPIUM BROMIDE AND ALBUTEROL SULFATE 1 AMPULE: .5; 3 SOLUTION RESPIRATORY (INHALATION) at 00:19

## 2021-04-24 RX ADMIN — Medication 10 ML: at 08:26

## 2021-04-24 RX ADMIN — METHYLPREDNISOLONE SODIUM SUCCINATE 40 MG: 40 INJECTION, POWDER, FOR SOLUTION INTRAMUSCULAR; INTRAVENOUS at 03:57

## 2021-04-24 RX ADMIN — SODIUM CHLORIDE SOLN NEBU 3% 15 ML: 3 NEBU SOLN at 19:51

## 2021-04-24 RX ADMIN — IPRATROPIUM BROMIDE AND ALBUTEROL SULFATE 1 AMPULE: .5; 3 SOLUTION RESPIRATORY (INHALATION) at 12:17

## 2021-04-24 RX ADMIN — IPRATROPIUM BROMIDE AND ALBUTEROL SULFATE 1 AMPULE: .5; 3 SOLUTION RESPIRATORY (INHALATION) at 19:51

## 2021-04-24 RX ADMIN — METOPROLOL TARTRATE 5 MG: 1 INJECTION, SOLUTION INTRAVENOUS at 08:46

## 2021-04-24 RX ADMIN — DEXTROSE MONOHYDRATE: 50 INJECTION, SOLUTION INTRAVENOUS at 07:15

## 2021-04-24 RX ADMIN — METOPROLOL TARTRATE 5 MG: 1 INJECTION, SOLUTION INTRAVENOUS at 16:05

## 2021-04-24 RX ADMIN — METOPROLOL TARTRATE 5 MG: 1 INJECTION, SOLUTION INTRAVENOUS at 22:11

## 2021-04-24 RX ADMIN — FAMOTIDINE 20 MG: 10 INJECTION, SOLUTION INTRAVENOUS at 08:25

## 2021-04-24 RX ADMIN — ENOXAPARIN SODIUM 50 MG: 60 INJECTION SUBCUTANEOUS at 08:26

## 2021-04-24 RX ADMIN — MORPHINE SULFATE 2 MG: 2 INJECTION, SOLUTION INTRAMUSCULAR; INTRAVENOUS at 00:30

## 2021-04-24 RX ADMIN — SODIUM CHLORIDE SOLN NEBU 3% 15 ML: 3 NEBU SOLN at 12:17

## 2021-04-24 RX ADMIN — ARFORMOTEROL TARTRATE 15 MCG: 15 SOLUTION RESPIRATORY (INHALATION) at 19:51

## 2021-04-24 RX ADMIN — BUDESONIDE 250 MCG: 0.25 SUSPENSION RESPIRATORY (INHALATION) at 19:51

## 2021-04-24 RX ADMIN — FAMOTIDINE 20 MG: 10 INJECTION, SOLUTION INTRAVENOUS at 19:36

## 2021-04-24 RX ADMIN — MORPHINE SULFATE 2 MG: 2 INJECTION, SOLUTION INTRAMUSCULAR; INTRAVENOUS at 23:18

## 2021-04-24 RX ADMIN — METOPROLOL TARTRATE 5 MG: 1 INJECTION, SOLUTION INTRAVENOUS at 03:57

## 2021-04-24 RX ADMIN — BUDESONIDE 250 MCG: 0.25 SUSPENSION RESPIRATORY (INHALATION) at 08:30

## 2021-04-24 RX ADMIN — ARFORMOTEROL TARTRATE 15 MCG: 15 SOLUTION RESPIRATORY (INHALATION) at 08:30

## 2021-04-24 RX ADMIN — SODIUM CHLORIDE, PRESERVATIVE FREE 10 ML: 5 INJECTION INTRAVENOUS at 19:37

## 2021-04-24 RX ADMIN — IPRATROPIUM BROMIDE AND ALBUTEROL SULFATE 1 AMPULE: .5; 3 SOLUTION RESPIRATORY (INHALATION) at 03:55

## 2021-04-24 RX ADMIN — DEXTROSE AND SODIUM CHLORIDE: 5; 450 INJECTION, SOLUTION INTRAVENOUS at 18:08

## 2021-04-24 RX ADMIN — ENOXAPARIN SODIUM 50 MG: 60 INJECTION SUBCUTANEOUS at 19:36

## 2021-04-24 RX ADMIN — LIDOCAINE HYDROCHLORIDE 5 ML: 10 INJECTION, SOLUTION EPIDURAL; INFILTRATION; INTRACAUDAL; PERINEURAL at 18:09

## 2021-04-24 ASSESSMENT — PAIN DESCRIPTION - DESCRIPTORS
DESCRIPTORS: PRESSURE
DESCRIPTORS: SORE

## 2021-04-24 ASSESSMENT — PAIN DESCRIPTION - PROGRESSION: CLINICAL_PROGRESSION: GRADUALLY WORSENING

## 2021-04-24 ASSESSMENT — PAIN DESCRIPTION - LOCATION
LOCATION: ABDOMEN

## 2021-04-24 ASSESSMENT — PAIN DESCRIPTION - PAIN TYPE
TYPE: ACUTE PAIN

## 2021-04-24 ASSESSMENT — PAIN - FUNCTIONAL ASSESSMENT
PAIN_FUNCTIONAL_ASSESSMENT: PREVENTS OR INTERFERES WITH MANY ACTIVE NOT PASSIVE ACTIVITIES
PAIN_FUNCTIONAL_ASSESSMENT: PREVENTS OR INTERFERES SOME ACTIVE ACTIVITIES AND ADLS

## 2021-04-24 ASSESSMENT — PAIN DESCRIPTION - ORIENTATION
ORIENTATION: MID
ORIENTATION: MID

## 2021-04-24 ASSESSMENT — PAIN DESCRIPTION - FREQUENCY
FREQUENCY: INTERMITTENT
FREQUENCY: CONTINUOUS
FREQUENCY: INTERMITTENT

## 2021-04-24 ASSESSMENT — PAIN SCALES - GENERAL
PAINLEVEL_OUTOF10: 8
PAINLEVEL_OUTOF10: 7
PAINLEVEL_OUTOF10: 8
PAINLEVEL_OUTOF10: 7

## 2021-04-24 ASSESSMENT — PAIN DESCRIPTION - ONSET: ONSET: ON-GOING

## 2021-04-24 NOTE — PROGRESS NOTES
Pt refusing turns. Educated on importance of relieving pressure on bony prominences to prevent bed sores and skin breakdown but pt stated that he is comfortable and does not want moved in the bed. He stated that he has been shifting himself \"from time to time. \"

## 2021-04-24 NOTE — PROGRESS NOTES
[facility] [phone]                                     Daily Progress Note                                                         Pt Name: Sidney Kennedy Day  Medical Record Number: [id number]  Date of Birth 1945   Today's Date: 4/24/2021  CC: SBO vs ileus    ASSESSMENT/PLAN  77 yo male with severe COPD, bilateral LL pneumonia, SBO vs ileus     -SBO vs ileus - Pt pulled out NG 4/22 middle of the night and refused replacement.  -Denies any nausea or emesis, Some belching    -Continue IV hydration. Poor surgical candidate.   -Passing flatus . Denies any BM for last 2 days and that was after he received a  Suppository.   -Continue clear liquids until bowel function improves  -Minimal tenderness. OK for clear liquids.   -He states that he feels better today     -Severe COPD, bilateral LL pneumonia, hypercarbia - BIPAP per pulmonology. On vancomycin, cefepime, flagyl. Steroids per pulmonology     -Hx of HTN, weight loss, deconditioning - per primary team.     -Severe malnutrition - will start supplements when tolerating diet       SUBJECTIVE  Sidney Kennedy is more distended today. Denies nausea but burping. . Passing flatus  OBJECTIVE  VITALS:  height is 5' 7\" (1.702 m) and weight is 105 lb 13.1 oz (48 kg). His axillary temperature is 98 °F (36.7 °C). His blood pressure is 154/98 (abnormal) and his pulse is 88. His respiration is 12 and oxygen saturation is 96%. INTAKE/OUTPUT:      Intake/Output Summary (Last 24 hours) at 4/24/2021 9838  Last data filed at 4/24/2021 0400  Gross per 24 hour   Intake 256.1 ml   Output 325 ml   Net -68.9 ml     GENERAL: alert, cooperative, no distress  LUNGS: clear to ausculation, without wheezes, rales or rhonci  HEART: normal rate and regular rhythm  ABDOMEN: More distended, minimal generalized tenderness without peritonitis. Scars consistent with surgical history.   EXTREMITY: no cyanosis, clubbing or edema    I/O last 3 completed shifts: In: 256.1 [I.V.:156.1; IV Piggyback:100]  Out: 325 [Urine:325]      LABS  Recent Labs     04/21/21 1815 04/21/21  1815 04/24/21  0402   WBC  --    < > 8.6   HGB  --    < > 10.7*   HCT  --    < > 33.4*   PLT  --    < > 175   NA  --    < > 151*   K  --    < > 4.6   CL  --    < > 110   CO2  --    < > 32   BUN  --    < > 41*   CREATININE  --    < > 0.8   MG  --    < > 2.90*   PHOS  --    < > 3.6   CALCIUM  --    < > 9.3   AST  --    < > 44*   ALT  --    < > 245*   BILITOT  --    < > 0.5   BILIDIR  --    < > <0.2   NITRU Negative  --   --    COLORU YELLOW  --   --     < > = values in this interval not displayed.        EDUCATION  Patient educated about Disease Process, Medications, Smoking Cessation, Oxygenation, Incentive Spirometry and Deep Breath and Cough, Diabetes, Hyperlipidemia, Smoking Cessation, Nutrition, Exercise and Hypertension    Electronically signed by BOB Cooper on 4/24/2021 at 9:06 AM      [facility]   [phone] Office  [phone] Cell     As above  Still bloated, no BM but reports some flatus  Continue supportive care  Repeat imaging next 1-2 days if not improved    Electronically signed by Nasreen Lorenz MD on 4/24/2021 at 10:41 AM

## 2021-04-24 NOTE — PROGRESS NOTES
PICC placement     Upon arrival to place PICC line assessed chart for issues related to picc placement, check for consent, and did time out with CURT Lipscomb. Pt. Tolerated PICC placement well, no difficulty accessing right basilic vein however some slight resistance was felt which could possibly be from previous PICC lines ut catheter did reach SVC and we do have confirmation. Would suggest using left arm if needing another in future. 3CG technology used to verify PICC tip placement. Positive P wave with no negative deflection. Printed wave form and placed In chart. Reported off to CURT Lipscomb ok to use line.
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
## 2021-04-24 NOTE — PROGRESS NOTES
Pulmonary Progress Note    CC: Acute on chronic hypercarbic respiratory failure  Very severe COPD with acute exacerbation  Bilateral lower lobe pneumonia  Mucus plugging   Small bowel obstruction   Acute metabolic encephalopathy   Transaminitis   Hypernatremia     24 hr events:  On 3L of oxygen. He reports intermittent shortness of breath and cough. ROS:  +intermittent SOB  +cough  No vomiting     PHYSICAL EXAM:  Blood pressure (!) 154/98, pulse 88, temperature 98 °F (36.7 °C), temperature source Axillary, resp. rate 12, height 5' 7\" (1.702 m), weight 105 lb 13.1 oz (48 kg), SpO2 97 %. on 3L NC    Intake/Output Summary (Last 24 hours) at 4/24/2021 0750  Last data filed at 4/24/2021 0400  Gross per 24 hour   Intake 256.1 ml   Output 325 ml   Net -68.9 ml       Gen: Well developed; thin  Eyes: No scleral icterus. No conjunctival injection. ENT:  External appearance of ears and nose normal.  Neck: Trachea midline. No obvious mass. No visible thyroid enlargement    Respiratory: Diffusely decreased breath sounds with crackles over bilateral lower lung zones, no accessory muscle use  Cardiovascular: Regular rate and rhythm, no appreciable murmurs. No edema. Gastrointestinal: Distended, non-tender. No hernia  Skin: Warm and dry. No rashes or ulcers on visible areas. Normal texture and turgor  Musculoskeletal: No cyanosis, clubbing or joint deformity.     Psychiatric: Normal mood and affect; exhibits normal insight and judgement     Medications:  Current Facility-Administered Medications: dextrose 5 % solution, , Intravenous, Continuous  morphine injection 2 mg, 2 mg, Intravenous, Q4H PRN  metoprolol (LOPRESSOR) injection 5 mg, 5 mg, Intravenous, Q6H  enoxaparin (LOVENOX) injection 50 mg, 1 mg/kg, Subcutaneous, BID  methylPREDNISolone sodium (SOLU-MEDROL) injection 40 mg, 40 mg, Intravenous, Q8H  phenol 1.4 % mouth spray 1 spray, 1 spray, Mouth/Throat, Q2H PRN  levoFLOXacin (LEVAQUIN) 500 MG/100ML infusion 500 mg, 500 mg, Intravenous, Q24H  ipratropium-albuterol (DUONEB) nebulizer solution 1 ampule, 1 ampule, Inhalation, Q4H  famotidine (PEPCID) injection 20 mg, 20 mg, Intravenous, BID  albuterol sulfate  (90 Base) MCG/ACT inhaler 2 puff, 2 puff, Inhalation, Q6H PRN  albuterol (PROVENTIL) nebulizer solution 2.5 mg, 2.5 mg, Nebulization, Q4H PRN  amLODIPine (NORVASC) tablet 5 mg, 5 mg, Oral, Daily  budesonide (PULMICORT) nebulizer suspension 250 mcg, 250 mcg, Nebulization, BID  sodium chloride flush 0.9 % injection 5-40 mL, 5-40 mL, Intravenous, 2 times per day  sodium chloride flush 0.9 % injection 5-40 mL, 5-40 mL, Intravenous, PRN  0.9 % sodium chloride infusion, 25 mL, Intravenous, PRN  promethazine (PHENERGAN) tablet 12.5 mg, 12.5 mg, Oral, Q6H PRN **OR** ondansetron (ZOFRAN) injection 4 mg, 4 mg, Intravenous, Q6H PRN  Arformoterol Tartrate (BROVANA) nebulizer solution 15 mcg, 15 mcg, Nebulization, BID  sodium chloride (Inhalant) 3 % nebulizer solution 15 mL, 15 mL, Nebulization, TID    Data reviewed:  Labs:  CBC:   Recent Labs     04/22/21 0315 04/23/21  0320 04/24/21  0402   WBC 17.4* 15.8* 8.6   HGB 10.7* 10.5* 10.7*   HCT 33.2* 33.3* 33.4*   .3* 101.7* 101.6*    173 175     BMP:   Recent Labs     04/22/21 0315 04/23/21  0320 04/24/21  0402    146* 151*   K 5.0 4.7 4.6    106 110   CO2 31 31 32   PHOS 4.0 2.8 3.6   BUN 28* 30* 41*   CREATININE 0.9 0.7* 0.8     LIVER PROFILE:   Recent Labs     04/22/21 0315 04/23/21  0320 04/24/21  0402   * 107* 44*   * 333* 245*   BILIDIR  --  <0.2 <0.2   BILITOT 0.4 0.5 0.5   ALKPHOS 210* 176* 161*     PT/INR:   No results for input(s): PROTIME, INR in the last 72 hours. APTT: No results for input(s): APTT in the last 72 hours.     Cultures:   Blood culture (4/21): NGTD  Nasal MRSA probe: Negative  Flu swab: Negative  COVID-19: Negative  Urine strep and legionella antigens: Negative      Films:  Chest images and reports were reviewed by me. My interpretation is:  CXR (4/21/21): Clear lung fields; gastric tube in place      Assessment:     Acute on chronic hypercarbic respiratory failure  Very severe COPD with acute exacerbation  Bilateral lower lobe pneumonia  Mucus plugging   Small bowel obstruction   Acute metabolic encephalopathy   Transaminitis   Hypernatremia      Plan:    Acute on chronic hypercarbic respiratory failure  -Due to pneumonia, mucus plugging and COPD  -Continue supplemental oxygen to keep oxygen saturation>90%     Very severe COPD with acute exacerbation  -Decrease Solumedrol to daily   -Levaquin (day #3)  -Duonebs every 4 hours  -Budesonide and arformoterol nebulizers     Bilateral lower lobe pneumonia  -Levaquin (day #3)     Mucus plugging   -Duonebs every 4 hours  -3% saline nebs 3 times daily      Small bowel obstruction   -Per general surgery      Acute metabolic encephalopathy  -Resolved     Transaminitis   -Cefepime and tylenol discontinued  -Check daily labs     Hypernatremia  -On D5W  -Check PM chem 7        Prophylaxis  DVT- lovenox  GI- pepcid     OK to transfer from ICU.     Paige Lee MD  [facility] Pulmonary, Critical Care and Sleep

## 2021-04-24 NOTE — PROGRESS NOTES
175     Recent Labs     04/22/21  0315 04/23/21  0320 04/24/21  0402    146* 151*   K 5.0 4.7 4.6    106 110   CO2 31 31 32   BUN 28* 30* 41*   CREATININE 0.9 0.7* 0.8   CALCIUM 8.0* 8.9 9.3   PHOS 4.0 2.8 3.6     Recent Labs     04/22/21  0315 04/23/21  0320 04/24/21  0402   * 107* 44*   * 333* 245*   BILIDIR  --  <0.2 <0.2   BILITOT 0.4 0.5 0.5   ALKPHOS 210* 176* 161*     No results for input(s): INR in the last 72 hours. No results for input(s): [place] in the last 72 hours. Urinalysis:      Lab Results   Component Value Date    NITRU Negative 04/21/2021    WBCUA 7 04/21/2021    BACTERIA 1+ 06/17/2014    RBCUA 2 04/21/2021    RBCUA 2 11/19/2011    BLOODU TRACE 04/21/2021    SPECGRAV >1.030 04/21/2021    GLUCOSEU Negative 04/21/2021       Radiology:  US ABDOMEN LIMITED   Final Result   Normal sonographic appearance the liver. Marked extrahepatic biliary   dilatation without significant intrahepatic biliary dilatation. Dilatation   bleed related to prior cholecystectomy and patient's age. No acute   abnormality. XR ABDOMEN (KUB) (SINGLE AP VIEW)   Final Result   Nasogastric tube projects to the proximal stomach, normal position. Persistent dilated loops of small bowel in the upper abdomen. XR CHEST PORTABLE   Final Result   No acute cardiac or pulmonary disease. NG tube extends into the stomach. CTA ABDOMEN PELVIS W CONTRAST   Final Result   1. Persisting diffuse small bowel dilatation with fluid-filled lumen   consistent with small bowel obstruction proximal to suggestion of focal   mechanical transition point at the central abdomen/right lower quadrant   possibly related to internal hernia. 2. Redemonstration of infrarenal fusiform abdominal aortic aneurysm with   lumen measuring up to 4.4 cm in greatest diameter, as discussed above.    3. Severe atherosclerotic disease involving the bilateral common, internal   and external iliac arterial vasculature and branches with associated   multifocal high-grade stenosis. 4. Celiac trunk origin focal high-grade stenosis secondary to marked   atherosclerotic disease. 5. Severe sigmoid colon diverticulosis without evidence of diverticulitis. 6. Bilateral lower lung lobe scattered \"tree-in-bud\" opacification pattern   consistent with bronchiolitis/small airway disease with posterior lower lung   lobe multifocal mild consolidation consistent with pneumonia. 7. Cholecystectomy with marked reservoir effect, as discussed above. 8. Splenic chronic granulomatous disease. 9. Mild diffuse distention of esophagus with fluid-filled lumen suggesting   association with gastroesophageal reflux. Recommend clinical correlation. 10. Symmetric bilateral renal cortical volume loss suggesting association   with senescent change versus chronic medical renal disease. 11. Redemonstration of suspected left adrenal gland adenoma. RECOMMENDATIONS:   4.4 cm infrarenal abdominal aortic aneurysm. Recommend follow-up every 12   months and vascular consultation. Reference: J Am Sophie Radiol 6399;21:243-712. CT CHEST WO CONTRAST   Final Result   1. Persisting diffuse small bowel dilatation with fluid-filled lumen   consistent with small bowel obstruction proximal to suggestion of focal   mechanical transition point at the central abdomen/right lower quadrant   possibly related to internal hernia. 2. Redemonstration of infrarenal fusiform abdominal aortic aneurysm with   lumen measuring up to 4.4 cm in greatest diameter, as discussed above. 3. Severe atherosclerotic disease involving the bilateral common, internal   and external iliac arterial vasculature and branches with associated   multifocal high-grade stenosis. 4. Celiac trunk origin focal high-grade stenosis secondary to marked   atherosclerotic disease. 5. Severe sigmoid colon diverticulosis without evidence of diverticulitis.    6. Bilateral lower lung lobe scattered \"tree-in-bud\" opacification pattern   consistent with bronchiolitis/small airway disease with posterior lower lung   lobe multifocal mild consolidation consistent with pneumonia. 7. Cholecystectomy with marked reservoir effect, as discussed above. 8. Splenic chronic granulomatous disease. 9. Mild diffuse distention of esophagus with fluid-filled lumen suggesting   association with gastroesophageal reflux. Recommend clinical correlation. 10. Symmetric bilateral renal cortical volume loss suggesting association   with senescent change versus chronic medical renal disease. 11. Redemonstration of suspected left adrenal gland adenoma. RECOMMENDATIONS:   4.4 cm infrarenal abdominal aortic aneurysm. Recommend follow-up every 12   months and vascular consultation. Reference: J Am Sophie Radiol 0062;72:099-152. 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 Sepsis, likely due to pneumonia, IV antibiotics started patient on cefepime Flagyl and vancomycin, changed to levaquin, pulmo consulted. 2. SBO, iv fluids, GS consulted. 3. Pneumonia, POA, cefepime vancomycin and Flagyl changed to Levaquin.    4. Acute on chronic respiratory failure with hypercarbia, POA, due to COPD exacerbation and potentially mucus plugging, not very compliant with BIPAP, Pulmo consulted.   5. Severe COPD, with

## 2021-04-25 LAB
ALBUMIN SERPL-MCNC: 3.5 G/DL (ref 3.4–5)
ALP BLD-CCNC: 141 U/L (ref 40–129)
ALT SERPL-CCNC: 149 U/L (ref 10–40)
ANION GAP SERPL CALCULATED.3IONS-SCNC: 5 MMOL/L (ref 3–16)
AST SERPL-CCNC: 21 U/L (ref 15–37)
BASOPHILS ABSOLUTE: 0 K/UL (ref 0–0.2)
BASOPHILS RELATIVE PERCENT: 0.1 %
BILIRUB SERPL-MCNC: 0.6 MG/DL (ref 0–1)
BILIRUBIN DIRECT: <0.2 MG/DL (ref 0–0.3)
BILIRUBIN, INDIRECT: ABNORMAL MG/DL (ref 0–1)
BLOOD CULTURE, ROUTINE: NORMAL
BLOOD CULTURE, ROUTINE: NORMAL
BUN BLDV-MCNC: 30 MG/DL (ref 7–20)
CALCIUM SERPL-MCNC: 8.6 MG/DL (ref 8.3–10.6)
CHLORIDE BLD-SCNC: 105 MMOL/L (ref 99–110)
CO2: 34 MMOL/L (ref 21–32)
CREAT SERPL-MCNC: 0.7 MG/DL (ref 0.8–1.3)
EOSINOPHILS ABSOLUTE: 0 K/UL (ref 0–0.6)
EOSINOPHILS RELATIVE PERCENT: 0 %
GFR AFRICAN AMERICAN: >60
GFR NON-AFRICAN AMERICAN: >60
GLUCOSE BLD-MCNC: 95 MG/DL (ref 70–99)
HCT VFR BLD CALC: 32.6 % (ref 40.5–52.5)
HEMOGLOBIN: 10.6 G/DL (ref 13.5–17.5)
LYMPHOCYTES ABSOLUTE: 0.4 K/UL (ref 1–5.1)
LYMPHOCYTES RELATIVE PERCENT: 4.3 %
MAGNESIUM: 2.6 MG/DL (ref 1.8–2.4)
MCH RBC QN AUTO: 32.7 PG (ref 26–34)
MCHC RBC AUTO-ENTMCNC: 32.4 G/DL (ref 31–36)
MCV RBC AUTO: 100.8 FL (ref 80–100)
MONOCYTES ABSOLUTE: 0.9 K/UL (ref 0–1.3)
MONOCYTES RELATIVE PERCENT: 10.9 %
NEUTROPHILS ABSOLUTE: 7.2 K/UL (ref 1.7–7.7)
NEUTROPHILS RELATIVE PERCENT: 84.7 %
PDW BLD-RTO: 13.8 % (ref 12.4–15.4)
PHOSPHORUS: 2.1 MG/DL (ref 2.5–4.9)
PHOSPHORUS: 2.4 MG/DL (ref 2.5–4.9)
PLATELET # BLD: 167 K/UL (ref 135–450)
PMV BLD AUTO: 7.6 FL (ref 5–10.5)
POTASSIUM SERPL-SCNC: 4.6 MMOL/L (ref 3.5–5.1)
RBC # BLD: 3.24 M/UL (ref 4.2–5.9)
SODIUM BLD-SCNC: 144 MMOL/L (ref 136–145)
TOTAL PROTEIN: 5.7 G/DL (ref 6.4–8.2)
WBC # BLD: 8.5 K/UL (ref 4–11)

## 2021-04-25 PROCEDURE — 84100 ASSAY OF PHOSPHORUS: CPT

## 2021-04-25 PROCEDURE — 87077 CULTURE AEROBIC IDENTIFY: CPT

## 2021-04-25 PROCEDURE — 2580000003 HC RX 258: Performed by: INTERNAL MEDICINE

## 2021-04-25 PROCEDURE — 6360000002 HC RX W HCPCS: Performed by: NURSE PRACTITIONER

## 2021-04-25 PROCEDURE — 80076 HEPATIC FUNCTION PANEL: CPT

## 2021-04-25 PROCEDURE — 85025 COMPLETE CBC W/AUTO DIFF WBC: CPT

## 2021-04-25 PROCEDURE — 2500000003 HC RX 250 WO HCPCS: Performed by: INTERNAL MEDICINE

## 2021-04-25 PROCEDURE — 2500000003 HC RX 250 WO HCPCS: Performed by: NURSE PRACTITIONER

## 2021-04-25 PROCEDURE — 94640 AIRWAY INHALATION TREATMENT: CPT

## 2021-04-25 PROCEDURE — 6360000002 HC RX W HCPCS: Performed by: INTERNAL MEDICINE

## 2021-04-25 PROCEDURE — 2060000000 HC ICU INTERMEDIATE R&B

## 2021-04-25 PROCEDURE — 36592 COLLECT BLOOD FROM PICC: CPT

## 2021-04-25 PROCEDURE — 94760 N-INVAS EAR/PLS OXIMETRY 1: CPT

## 2021-04-25 PROCEDURE — 6370000000 HC RX 637 (ALT 250 FOR IP): Performed by: NURSE PRACTITIONER

## 2021-04-25 PROCEDURE — 6370000000 HC RX 637 (ALT 250 FOR IP): Performed by: INTERNAL MEDICINE

## 2021-04-25 PROCEDURE — 99232 SBSQ HOSP IP/OBS MODERATE 35: CPT | Performed by: INTERNAL MEDICINE

## 2021-04-25 PROCEDURE — 87070 CULTURE OTHR SPECIMN AEROBIC: CPT

## 2021-04-25 PROCEDURE — 86403 PARTICLE AGGLUT ANTBDY SCRN: CPT

## 2021-04-25 PROCEDURE — 80048 BASIC METABOLIC PNL TOTAL CA: CPT

## 2021-04-25 PROCEDURE — 2700000000 HC OXYGEN THERAPY PER DAY

## 2021-04-25 PROCEDURE — 83735 ASSAY OF MAGNESIUM: CPT

## 2021-04-25 PROCEDURE — 2580000003 HC RX 258: Performed by: NURSE PRACTITIONER

## 2021-04-25 PROCEDURE — 87186 SC STD MICRODIL/AGAR DIL: CPT

## 2021-04-25 PROCEDURE — 87205 SMEAR GRAM STAIN: CPT

## 2021-04-25 RX ORDER — AMLODIPINE BESYLATE 5 MG/1
5 TABLET ORAL ONCE
Status: COMPLETED | OUTPATIENT
Start: 2021-04-25 | End: 2021-04-25

## 2021-04-25 RX ORDER — AMLODIPINE BESYLATE 10 MG/1
10 TABLET ORAL DAILY
Status: DISCONTINUED | OUTPATIENT
Start: 2021-04-26 | End: 2021-05-04 | Stop reason: HOSPADM

## 2021-04-25 RX ORDER — PREDNISONE 20 MG/1
40 TABLET ORAL DAILY
Status: COMPLETED | OUTPATIENT
Start: 2021-04-26 | End: 2021-04-29

## 2021-04-25 RX ADMIN — FAMOTIDINE 20 MG: 10 INJECTION, SOLUTION INTRAVENOUS at 08:10

## 2021-04-25 RX ADMIN — METOPROLOL TARTRATE 5 MG: 1 INJECTION, SOLUTION INTRAVENOUS at 17:11

## 2021-04-25 RX ADMIN — AMLODIPINE BESYLATE 5 MG: 5 TABLET ORAL at 09:59

## 2021-04-25 RX ADMIN — LEVOFLOXACIN 500 MG: 5 INJECTION, SOLUTION INTRAVENOUS at 09:59

## 2021-04-25 RX ADMIN — SODIUM CHLORIDE, PRESERVATIVE FREE 10 ML: 5 INJECTION INTRAVENOUS at 20:17

## 2021-04-25 RX ADMIN — IPRATROPIUM BROMIDE AND ALBUTEROL SULFATE 1 AMPULE: .5; 3 SOLUTION RESPIRATORY (INHALATION) at 12:06

## 2021-04-25 RX ADMIN — ENOXAPARIN SODIUM 50 MG: 60 INJECTION SUBCUTANEOUS at 20:17

## 2021-04-25 RX ADMIN — BUDESONIDE 250 MCG: 0.25 SUSPENSION RESPIRATORY (INHALATION) at 08:27

## 2021-04-25 RX ADMIN — SODIUM CHLORIDE SOLN NEBU 3% 15 ML: 3 NEBU SOLN at 12:06

## 2021-04-25 RX ADMIN — METOPROLOL TARTRATE 5 MG: 1 INJECTION, SOLUTION INTRAVENOUS at 09:59

## 2021-04-25 RX ADMIN — SODIUM CHLORIDE SOLN NEBU 3% 15 ML: 3 NEBU SOLN at 19:53

## 2021-04-25 RX ADMIN — ENOXAPARIN SODIUM 50 MG: 60 INJECTION SUBCUTANEOUS at 08:10

## 2021-04-25 RX ADMIN — SODIUM CHLORIDE SOLN NEBU 3% 15 ML: 3 NEBU SOLN at 08:29

## 2021-04-25 RX ADMIN — IPRATROPIUM BROMIDE AND ALBUTEROL SULFATE 1 AMPULE: .5; 3 SOLUTION RESPIRATORY (INHALATION) at 23:42

## 2021-04-25 RX ADMIN — BUDESONIDE 250 MCG: 0.25 SUSPENSION RESPIRATORY (INHALATION) at 19:53

## 2021-04-25 RX ADMIN — METOPROLOL TARTRATE 5 MG: 1 INJECTION, SOLUTION INTRAVENOUS at 21:51

## 2021-04-25 RX ADMIN — MORPHINE SULFATE 2 MG: 2 INJECTION, SOLUTION INTRAMUSCULAR; INTRAVENOUS at 10:05

## 2021-04-25 RX ADMIN — FAMOTIDINE 20 MG: 10 INJECTION, SOLUTION INTRAVENOUS at 20:17

## 2021-04-25 RX ADMIN — IPRATROPIUM BROMIDE AND ALBUTEROL SULFATE 1 AMPULE: .5; 3 SOLUTION RESPIRATORY (INHALATION) at 15:56

## 2021-04-25 RX ADMIN — IPRATROPIUM BROMIDE AND ALBUTEROL SULFATE 1 AMPULE: .5; 3 SOLUTION RESPIRATORY (INHALATION) at 19:53

## 2021-04-25 RX ADMIN — IPRATROPIUM BROMIDE AND ALBUTEROL SULFATE 1 AMPULE: .5; 3 SOLUTION RESPIRATORY (INHALATION) at 04:29

## 2021-04-25 RX ADMIN — ARFORMOTEROL TARTRATE 15 MCG: 15 SOLUTION RESPIRATORY (INHALATION) at 08:25

## 2021-04-25 RX ADMIN — IPRATROPIUM BROMIDE AND ALBUTEROL SULFATE 1 AMPULE: .5; 3 SOLUTION RESPIRATORY (INHALATION) at 08:25

## 2021-04-25 RX ADMIN — METHYLPREDNISOLONE SODIUM SUCCINATE 40 MG: 40 INJECTION, POWDER, FOR SOLUTION INTRAMUSCULAR; INTRAVENOUS at 08:10

## 2021-04-25 RX ADMIN — AMLODIPINE BESYLATE 5 MG: 5 TABLET ORAL at 08:10

## 2021-04-25 RX ADMIN — METOPROLOL TARTRATE 5 MG: 1 INJECTION, SOLUTION INTRAVENOUS at 04:13

## 2021-04-25 RX ADMIN — ARFORMOTEROL TARTRATE 15 MCG: 15 SOLUTION RESPIRATORY (INHALATION) at 19:53

## 2021-04-25 RX ADMIN — SODIUM PHOSPHATE, MONOBASIC, MONOHYDRATE 10 MMOL: 276; 142 INJECTION, SOLUTION INTRAVENOUS at 15:05

## 2021-04-25 RX ADMIN — DEXTROSE AND SODIUM CHLORIDE: 5; 450 INJECTION, SOLUTION INTRAVENOUS at 17:11

## 2021-04-25 ASSESSMENT — PAIN SCALES - GENERAL
PAINLEVEL_OUTOF10: 5
PAINLEVEL_OUTOF10: 10

## 2021-04-25 ASSESSMENT — PAIN - FUNCTIONAL ASSESSMENT: PAIN_FUNCTIONAL_ASSESSMENT: PREVENTS OR INTERFERES SOME ACTIVE ACTIVITIES AND ADLS

## 2021-04-25 NOTE — PROGRESS NOTES
Hospitalist Progress Note      PCP: Tiffanie Dunne MD    Date of Admission: 4/21/2021      Subjective: some cough, passing flatus, still no BM, still with abdominal pain. Medications:  Reviewed    Infusion Medications    sodium chloride      dextrose 5 % and 0.45 % NaCl 50 mL/hr at 04/24/21 1808     Scheduled Medications    methylPREDNISolone  40 mg Intravenous Daily    sodium chloride flush  5-40 mL Intravenous 2 times per day    metoprolol  5 mg Intravenous Q6H    enoxaparin  1 mg/kg Subcutaneous BID    levofloxacin  500 mg Intravenous Q24H    ipratropium-albuterol  1 ampule Inhalation Q4H    famotidine (PEPCID) injection  20 mg Intravenous BID    amLODIPine  5 mg Oral Daily    budesonide  250 mcg Nebulization BID    Arformoterol Tartrate  15 mcg Nebulization BID    sodium chloride (Inhalant)  15 mL Nebulization TID     PRN Meds: sodium chloride flush, sodium chloride, morphine, phenol, albuterol sulfate HFA, albuterol, promethazine **OR** ondansetron      Intake/Output Summary (Last 24 hours) at 4/25/2021 0937  Last data filed at 4/25/2021 0818  Gross per 24 hour   Intake 1286.83 ml   Output 1125 ml   Net 161.83 ml       Physical Exam Performed:    BP (!) 178/68   Pulse 95   Temp 98.5 °F (36.9 °C) (Oral)   Resp 17   Ht 5' 7\" (1.702 m)   Wt 113 lb 5.1 oz (51.4 kg)   SpO2 99%   BMI 17.75 kg/m²     General appearance: No apparent distress  Neck: Supple  Respiratory:  Normal respiratory effort. Clear to auscultation, bilaterally without Rales/Wheezes/Rhonchi. Cardiovascular: Regular rate and rhythm with normal S1/S2 without murmurs, rubs or gallops. Abdomen: Soft, tenderness noted, less distended   Musculoskeletal: No clubbing, cyanosis  Skin: Skin color, texture, turgor normal.  No rashes or lesions.   Neurologic:  No focal weakness   Psychiatric: Alert and oriented  Capillary Refill: Brisk,3 seconds, normal   Peripheral Pulses: +2 palpable, equal bilaterally       Labs:   Recent Labs     04/23/21 0320 04/24/21  0402 04/25/21  0455   WBC 15.8* 8.6 8.5   HGB 10.5* 10.7* 10.6*   HCT 33.3* 33.4* 32.6*    175 167     Recent Labs     04/23/21 0320 04/24/21  0402 04/24/21  1446 04/25/21  0455   * 151* 146* 144   K 4.7 4.6 4.5 4.6    110 106 105   CO2 31 32 33* 34*   BUN 30* 41* 36* 30*   CREATININE 0.7* 0.8 0.7* 0.7*   CALCIUM 8.9 9.3 8.9 8.6   PHOS 2.8 3.6  --  2.4*     Recent Labs     04/23/21 0320 04/24/21  0402 04/25/21  0455   * 44* 21   * 245* 149*   BILIDIR <0.2 <0.2 <0.2   BILITOT 0.5 0.5 0.6   ALKPHOS 176* 161* 141*     No results for input(s): INR in the last 72 hours. No results for input(s): [place] in the last 72 hours. Urinalysis:      Lab Results   Component Value Date    NITRU Negative 04/21/2021    WBCUA 7 04/21/2021    BACTERIA 1+ 06/17/2014    RBCUA 2 04/21/2021    RBCUA 2 11/19/2011    BLOODU TRACE 04/21/2021    SPECGRAV >1.030 04/21/2021    GLUCOSEU Negative 04/21/2021       Radiology:  US ABDOMEN LIMITED   Final Result   Normal sonographic appearance the liver. Marked extrahepatic biliary   dilatation without significant intrahepatic biliary dilatation. Dilatation   bleed related to prior cholecystectomy and patient's age. No acute   abnormality. XR ABDOMEN (KUB) (SINGLE AP VIEW)   Final Result   Nasogastric tube projects to the proximal stomach, normal position. Persistent dilated loops of small bowel in the upper abdomen. XR CHEST PORTABLE   Final Result   No acute cardiac or pulmonary disease. NG tube extends into the stomach. CTA ABDOMEN PELVIS W CONTRAST   Final Result   1. Persisting diffuse small bowel dilatation with fluid-filled lumen   consistent with small bowel obstruction proximal to suggestion of focal   mechanical transition point at the central abdomen/right lower quadrant   possibly related to internal hernia.    2. Redemonstration of infrarenal fusiform abdominal aortic aneurysm with   lumen measuring up to 4.4 cm in greatest diameter, as discussed above. 3. Severe atherosclerotic disease involving the bilateral common, internal   and external iliac arterial vasculature and branches with associated   multifocal high-grade stenosis. 4. Celiac trunk origin focal high-grade stenosis secondary to marked   atherosclerotic disease. 5. Severe sigmoid colon diverticulosis without evidence of diverticulitis. 6. Bilateral lower lung lobe scattered \"tree-in-bud\" opacification pattern   consistent with bronchiolitis/small airway disease with posterior lower lung   lobe multifocal mild consolidation consistent with pneumonia. 7. Cholecystectomy with marked reservoir effect, as discussed above. 8. Splenic chronic granulomatous disease. 9. Mild diffuse distention of esophagus with fluid-filled lumen suggesting   association with gastroesophageal reflux. Recommend clinical correlation. 10. Symmetric bilateral renal cortical volume loss suggesting association   with senescent change versus chronic medical renal disease. 11. Redemonstration of suspected left adrenal gland adenoma. RECOMMENDATIONS:   4.4 cm infrarenal abdominal aortic aneurysm. Recommend follow-up every 12   months and vascular consultation. Reference: J Am Sophie Radiol 2249;32:653-465. CT CHEST WO CONTRAST   Final Result   1. Persisting diffuse small bowel dilatation with fluid-filled lumen   consistent with small bowel obstruction proximal to suggestion of focal   mechanical transition point at the central abdomen/right lower quadrant   possibly related to internal hernia. 2. Redemonstration of infrarenal fusiform abdominal aortic aneurysm with   lumen measuring up to 4.4 cm in greatest diameter, as discussed above. 3. Severe atherosclerotic disease involving the bilateral common, internal   and external iliac arterial vasculature and branches with associated   multifocal high-grade stenosis. 4. Celiac trunk origin focal high-grade stenosis secondary to marked   atherosclerotic disease. 5. Severe sigmoid colon diverticulosis without evidence of diverticulitis. 6. Bilateral lower lung lobe scattered \"tree-in-bud\" opacification pattern   consistent with bronchiolitis/small airway disease with posterior lower lung   lobe multifocal mild consolidation consistent with pneumonia. 7. Cholecystectomy with marked reservoir effect, as discussed above. 8. Splenic chronic granulomatous disease. 9. Mild diffuse distention of esophagus with fluid-filled lumen suggesting   association with gastroesophageal reflux. Recommend clinical correlation. 10. Symmetric bilateral renal cortical volume loss suggesting association   with senescent change versus chronic medical renal disease. 11. Redemonstration of suspected left adrenal gland adenoma. RECOMMENDATIONS:   4.4 cm infrarenal abdominal aortic aneurysm. Recommend follow-up every 12   months and vascular consultation. Reference: J Am Sophie Radiol 5078;37:576-192. 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 Sepsis, likely due to pneumonia, IV antibiotics started patient on cefepime Flagyl and vancomycin, changed to levaquin, pulmo consulted. trasnfered out of ICU. 2. SBO, iv fluids, GS consulted. passing flatus, but no BM.    3.

## 2021-04-25 NOTE — PROGRESS NOTES
4 Eyes Skin Assessment     NAME:  Jaison De Leon  YOB: 1945  MEDICAL RECORD NUMBER:  [id number]    The patient is being assess for  Transfer to New Unit    I agree that 2 RN's have performed a thorough Head to Toe Skin Assessment on the patient. ALL assessment sites listed below have been assessed. Areas assessed by both nurses:    Head, Face, Ears, Shoulders, Back, Chest, Arms, Elbows, Hands, Sacrum. Buttock, Coccyx, Ischium and Legs. Feet and Heels        Does the Patient have a Wound?  No noted wound(s)       Esteban Prevention initiated:  Yes   Wound Care Orders initiated:  No    Pressure Injury (Stage 3,4, Unstageable, DTI, NWPT, and Complex wounds) if present place consult order under [de-identified] No    New and Established Ostomies if present place consult order under : No      Nurse 1 eSignature: Electronically signed by Isabel Pringle RN on 4/25/21 at 12:16 AM EDT    **SHARE this note so that the co-signing nurse is able to place an eSignature**    Nurse 2 eSignature: Electronically signed by Alex Fletcher RN on 4/25/21 at 5:55 AM EDT

## 2021-04-25 NOTE — PROGRESS NOTES
Progress Note  Date:2021       Room:C5G-8105/5127-01  Patient Name:Samir De Leon     YOB: 1945     Age:75 y.o. Subjective    Subjective:  Symptoms:  Stable. Diet:  Poor intake. Activity level: Impaired due to weakness. Review of Systems  Objective         Vitals Last 24 Hours:  TEMPERATURE:  Temp  Av.6 °F (37 °C)  Min: 98.4 °F (36.9 °C)  Max: 98.9 °F (37.2 °C)  RESPIRATIONS RANGE: Resp  Avg: 15.2  Min: 9  Max: 18  PULSE OXIMETRY RANGE: SpO2  Av.5 %  Min: 95 %  Max: 100 %  PULSE RANGE: Pulse  Av.9  Min: 66  Max: 108  BLOOD PRESSURE RANGE: Systolic (75MAG), MWY:714 , Min:143 , FM   ; Diastolic (62KEL), VQX:76, Min:56, Max:80    I/O (24Hr): Intake/Output Summary (Last 24 hours) at 2021 0905  Last data filed at 2021 0818  Gross per 24 hour   Intake 1286.83 ml   Output 1125 ml   Net 161.83 ml     Objective  Labs/Imaging/Diagnostics    Labs:  CBC:  Recent Labs     21  0320 21  0402 21  0455   WBC 15.8* 8.6 8.5   RBC 3.27* 3.28* 3.24*   HGB 10.5* 10.7* 10.6*   HCT 33.3* 33.4* 32.6*   .7* 101.6* 100.8*   RDW 14.1 14.0 13.8    175 167     CHEMISTRIES:  Recent Labs     21  0320 21  0402 21  1446 21  0455   * 151* 146* 144   K 4.7 4.6 4.5 4.6    110 106 105   CO2 31 32 33* 34*   BUN 30* 41* 36* 30*   CREATININE 0.7* 0.8 0.7* 0.7*   GLUCOSE 113* 109* 112* 95   PHOS 2.8 3.6  --  2.4*   MG 2.70* 2.90*  --  2.60*     PT/INR:No results for input(s): PROTIME, INR in the last 72 hours. APTT:No results for input(s): APTT in the last 72 hours. LIVER PROFILE:  Recent Labs     21  0320 21  0402 21  0455   * 44* 21   * 245* 149*   BILIDIR <0.2 <0.2 <0.2   BILITOT 0.5 0.5 0.6   ALKPHOS 176* 161* 141*       Imaging Last 24 Hours:  No results found.   Assessment//Plan           Hospital Problems           Last Modified POA    * (Principal) Sepsis ([facility] 2021 Yes Hypertension 4/21/2021 Yes    Small bowel obstruction ([address] 4/21/2021 Yes    Aortic aneurysm ([address] 4/21/2021 Yes    Gram-negative pneumonia ([address] 4/21/2021 Yes    Acute metabolic encephalopathy 8/15/7175 Yes    SBO (small bowel obstruction) ([address] 4/21/2021 Yes    Pneumonia due to organism 4/21/2021 Yes    Severe malnutrition (HCC) (Chronic) 4/21/2021 Yes    Acute on chronic respiratory failure with hypercapnia ([address] 4/21/2021 Yes    COPD with acute exacerbation ([address] 4/21/2021 Yes    Mucus plugging of bronchi 4/21/2021 Yes    Celiac artery stenosis ([address] 4/21/2021 Yes    Transaminitis 4/22/2021 Yes    Hypernatremia 4/24/2021 Yes        Assessment & Plan    Ileus vs PSBO   Passing flatus but still no BM   Not ready for much PO   Sips for now, clears later if improved   Repeat imaging in AM if not improving    Electronically signed by Francisca Chan MD on 4/25/2021 at 9:06 AM    Electronically signed by Francisca Chan MD on 4/25/21 at 9:05 AM EDT

## 2021-04-25 NOTE — PLAN OF CARE
Problem: Pain:  Goal: Pain level will decrease  Description: Pain level will decrease  Outcome: Ongoing     Problem: Pain:  Goal: Control of acute pain  Description: Control of acute pain  Outcome: Ongoing     Problem: Pain:  Goal: Control of chronic pain  Description: Control of chronic pain  Outcome: Ongoing     Problem: Pain:  Goal: Patient's pain/discomfort is manageable  Description: Patient's pain/discomfort is manageable  Outcome: Ongoing     Problem: Infection:  Goal: Will remain free from infection  Description: Will remain free from infection  Outcome: Ongoing     Problem: Daily Care:  Goal: Daily care needs are met  Description: Daily care needs are met  Outcome: Ongoing     Problem: Skin Integrity:  Goal: Skin integrity will stabilize  Description: Skin integrity will stabilize  Outcome: Ongoing     Problem: Falls - Risk of:  Goal: Will remain free from falls  Description: Will remain free from falls  Outcome: Ongoing

## 2021-04-25 NOTE — PROGRESS NOTES
Pulmonary Progress Note    CC: Acute on chronic hypercarbic respiratory failure  Very severe COPD with acute exacerbation  Bilateral lower lobe pneumonia  Mucus plugging   Small bowel obstruction     24 hr events:  On 3L NC. He denies shortness of breath. He has an intermittent cough. Tolerating sips of clears. ROS:  No SOB  +cough  No vomiting     PHYSICAL EXAM:  Blood pressure (!) 166/61, pulse 101, temperature 98.5 °F (36.9 °C), temperature source Oral, resp. rate 18, height 5' 7\" (1.702 m), weight 113 lb 5.1 oz (51.4 kg), SpO2 97 %. on 3L NC    Intake/Output Summary (Last 24 hours) at 4/25/2021 1223  Last data filed at 4/25/2021 0959  Gross per 24 hour   Intake 1346.83 ml   Output 1125 ml   Net 221.83 ml       Gen: Well developed; thin  Eyes: No scleral icterus. No conjunctival injection. ENT:  External appearance of ears and nose normal.  Neck: Trachea midline. No obvious mass. No visible thyroid enlargement    Respiratory: Faint expiratory wheezes bilaterally, no accessory muscle use  Cardiovascular: Regular rate and rhythm, no appreciable murmurs. No edema. Gastrointestinal: Distended, non-tender. No hernia  Skin: Warm and dry. No rashes or ulcers on visible areas. Normal texture and turgor  Musculoskeletal: No cyanosis, clubbing or joint deformity.     Psychiatric: Normal mood and affect; exhibits normal insight and judgement     Medications:  Current Facility-Administered Medications: [START ON 4/26/2021] amLODIPine (NORVASC) tablet 10 mg, 10 mg, Oral, Daily  methylPREDNISolone sodium (SOLU-MEDROL) injection 40 mg, 40 mg, Intravenous, Daily  sodium chloride flush 0.9 % injection 5-40 mL, 5-40 mL, Intravenous, 2 times per day  sodium chloride flush 0.9 % injection 5-40 mL, 5-40 mL, Intravenous, PRN  0.9 % sodium chloride infusion, 25 mL, Intravenous, PRN  dextrose 5 % and 0.45 % sodium chloride infusion, , Intravenous, Continuous  morphine injection 2 mg, 2 mg, Intravenous, Q4H PRN  metoprolol (LOPRESSOR) injection 5 mg, 5 mg, Intravenous, Q6H  enoxaparin (LOVENOX) injection 50 mg, 1 mg/kg, Subcutaneous, BID  phenol 1.4 % mouth spray 1 spray, 1 spray, Mouth/Throat, Q2H PRN  levoFLOXacin (LEVAQUIN) 500 MG/100ML infusion 500 mg, 500 mg, Intravenous, Q24H  ipratropium-albuterol (DUONEB) nebulizer solution 1 ampule, 1 ampule, Inhalation, Q4H  famotidine (PEPCID) injection 20 mg, 20 mg, Intravenous, BID  albuterol sulfate  (90 Base) MCG/ACT inhaler 2 puff, 2 puff, Inhalation, Q6H PRN  albuterol (PROVENTIL) nebulizer solution 2.5 mg, 2.5 mg, Nebulization, Q4H PRN  budesonide (PULMICORT) nebulizer suspension 250 mcg, 250 mcg, Nebulization, BID  promethazine (PHENERGAN) tablet 12.5 mg, 12.5 mg, Oral, Q6H PRN **OR** ondansetron (ZOFRAN) injection 4 mg, 4 mg, Intravenous, Q6H PRN  Arformoterol Tartrate (BROVANA) nebulizer solution 15 mcg, 15 mcg, Nebulization, BID  sodium chloride (Inhalant) 3 % nebulizer solution 15 mL, 15 mL, Nebulization, TID    Data reviewed:  Labs:  CBC:   Recent Labs     04/23/21  0320 04/24/21  0402 04/25/21  0455   WBC 15.8* 8.6 8.5   HGB 10.5* 10.7* 10.6*   HCT 33.3* 33.4* 32.6*   .7* 101.6* 100.8*    175 167     BMP:   Recent Labs     04/24/21  0402 04/24/21  1446 04/25/21  0455 04/25/21  1129   * 146* 144  --    K 4.6 4.5 4.6  --     106 105  --    CO2 32 33* 34*  --    PHOS 3.6  --  2.4* 2.1*   BUN 41* 36* 30*  --    CREATININE 0.8 0.7* 0.7*  --      LIVER PROFILE:   Recent Labs     04/23/21  0320 04/24/21  0402 04/25/21  0455   * 44* 21   * 245* 149*   BILIDIR <0.2 <0.2 <0.2   BILITOT 0.5 0.5 0.6   ALKPHOS 176* 161* 141*     PT/INR:   No results for input(s): PROTIME, INR in the last 72 hours. APTT: No results for input(s): APTT in the last 72 hours.     Cultures:   Blood culture (4/21): Negative  Sputum culture (4/25): Pending   Nasal MRSA probe: Negative  Flu swab: Negative  COVID-19: Negative  Urine strep and legionella antigens: Negative      Films:  Chest images and reports were reviewed by me. My interpretation is:  CXR (4/21/21): Clear lung fields; gastric tube in place      Assessment:     Acute on chronic hypercarbic respiratory failure  Very severe COPD with acute exacerbation  Bilateral lower lobe pneumonia  Mucus plugging   Small bowel obstruction       Plan:    Acute on chronic hypercarbic respiratory failure  -Due to pneumonia, mucus plugging and COPD  -Continue supplemental oxygen to keep oxygen saturation>90%     Very severe COPD with acute exacerbation  -Change Solumedrol to prednisone 40mg daily and tapers as follows: 40mg daily x 4 days, then 20mg daily x 3 days, then 10mg daily x 3 days, then resume home dose of 5mg daily    -Levaquin (day #4/7)  -Duonebs every 4 hours  -Budesonide and arformoterol nebulizers     Bilateral lower lobe pneumonia  -Levaquin (day #4/7)     Mucus plugging   -Duonebs every 4 hours  -3% saline nebs 3 times daily      Small bowel obstruction   -On clear liquid diet   -Per general surgery     Thank you for allowing me to participate in the care of this patient. Will sign off. Please call with any questions or concerns.  Should come for pulmonary follow up 5/5/21 at [address] [place], [place] [address]MD [facility]

## 2021-04-25 NOTE — PROGRESS NOTES
Patient ate two popsicles and ice chips throughout day. Tolerated well. Patient passing gas. No bowel movement.

## 2021-04-26 ENCOUNTER — APPOINTMENT (OUTPATIENT)
Dept: GENERAL RADIOLOGY | Age: 76
DRG: 871 | End: 2021-04-26
Payer: MEDICARE

## 2021-04-26 ENCOUNTER — TELEPHONE (OUTPATIENT)
Dept: PULMONOLOGY | Age: 76
End: 2021-04-26

## 2021-04-26 LAB
ALBUMIN SERPL-MCNC: 3.4 G/DL (ref 3.4–5)
ALP BLD-CCNC: 132 U/L (ref 40–129)
ALT SERPL-CCNC: 101 U/L (ref 10–40)
ANION GAP SERPL CALCULATED.3IONS-SCNC: 7 MMOL/L (ref 3–16)
AST SERPL-CCNC: 15 U/L (ref 15–37)
BASOPHILS ABSOLUTE: 0 K/UL (ref 0–0.2)
BASOPHILS RELATIVE PERCENT: 0.2 %
BILIRUB SERPL-MCNC: 0.7 MG/DL (ref 0–1)
BILIRUBIN DIRECT: <0.2 MG/DL (ref 0–0.3)
BILIRUBIN, INDIRECT: ABNORMAL MG/DL (ref 0–1)
BUN BLDV-MCNC: 16 MG/DL (ref 7–20)
CALCIUM SERPL-MCNC: 8.7 MG/DL (ref 8.3–10.6)
CHLORIDE BLD-SCNC: 101 MMOL/L (ref 99–110)
CO2: 34 MMOL/L (ref 21–32)
CREAT SERPL-MCNC: 0.6 MG/DL (ref 0.8–1.3)
EOSINOPHILS ABSOLUTE: 0 K/UL (ref 0–0.6)
EOSINOPHILS RELATIVE PERCENT: 0.1 %
GFR AFRICAN AMERICAN: >60
GFR NON-AFRICAN AMERICAN: >60
GLUCOSE BLD-MCNC: 98 MG/DL (ref 70–99)
HCT VFR BLD CALC: 31.8 % (ref 40.5–52.5)
HEMOGLOBIN: 10.3 G/DL (ref 13.5–17.5)
LYMPHOCYTES ABSOLUTE: 0.8 K/UL (ref 1–5.1)
LYMPHOCYTES RELATIVE PERCENT: 14 %
MAGNESIUM: 2.2 MG/DL (ref 1.8–2.4)
MCH RBC QN AUTO: 32.7 PG (ref 26–34)
MCHC RBC AUTO-ENTMCNC: 32.5 G/DL (ref 31–36)
MCV RBC AUTO: 100.6 FL (ref 80–100)
MONOCYTES ABSOLUTE: 0.8 K/UL (ref 0–1.3)
MONOCYTES RELATIVE PERCENT: 14.4 %
NEUTROPHILS ABSOLUTE: 3.9 K/UL (ref 1.7–7.7)
NEUTROPHILS RELATIVE PERCENT: 71.3 %
PDW BLD-RTO: 13.2 % (ref 12.4–15.4)
PHOSPHORUS: 2.2 MG/DL (ref 2.5–4.9)
PLATELET # BLD: 153 K/UL (ref 135–450)
PMV BLD AUTO: 8.5 FL (ref 5–10.5)
POTASSIUM SERPL-SCNC: 4 MMOL/L (ref 3.5–5.1)
RBC # BLD: 3.16 M/UL (ref 4.2–5.9)
SODIUM BLD-SCNC: 142 MMOL/L (ref 136–145)
TOTAL PROTEIN: 5.7 G/DL (ref 6.4–8.2)
WBC # BLD: 5.5 K/UL (ref 4–11)

## 2021-04-26 PROCEDURE — 84100 ASSAY OF PHOSPHORUS: CPT

## 2021-04-26 PROCEDURE — 6360000002 HC RX W HCPCS: Performed by: NURSE PRACTITIONER

## 2021-04-26 PROCEDURE — 80076 HEPATIC FUNCTION PANEL: CPT

## 2021-04-26 PROCEDURE — 2580000003 HC RX 258: Performed by: INTERNAL MEDICINE

## 2021-04-26 PROCEDURE — 85025 COMPLETE CBC W/AUTO DIFF WBC: CPT

## 2021-04-26 PROCEDURE — 6370000000 HC RX 637 (ALT 250 FOR IP): Performed by: SURGERY

## 2021-04-26 PROCEDURE — 94760 N-INVAS EAR/PLS OXIMETRY 1: CPT

## 2021-04-26 PROCEDURE — 6370000000 HC RX 637 (ALT 250 FOR IP): Performed by: NURSE PRACTITIONER

## 2021-04-26 PROCEDURE — 6370000000 HC RX 637 (ALT 250 FOR IP): Performed by: INTERNAL MEDICINE

## 2021-04-26 PROCEDURE — 94640 AIRWAY INHALATION TREATMENT: CPT

## 2021-04-26 PROCEDURE — 6360000002 HC RX W HCPCS: Performed by: INTERNAL MEDICINE

## 2021-04-26 PROCEDURE — 94761 N-INVAS EAR/PLS OXIMETRY MLT: CPT

## 2021-04-26 PROCEDURE — 2500000003 HC RX 250 WO HCPCS: Performed by: NURSE PRACTITIONER

## 2021-04-26 PROCEDURE — 2500000003 HC RX 250 WO HCPCS: Performed by: INTERNAL MEDICINE

## 2021-04-26 PROCEDURE — 74019 RADEX ABDOMEN 2 VIEWS: CPT

## 2021-04-26 PROCEDURE — 2700000000 HC OXYGEN THERAPY PER DAY

## 2021-04-26 PROCEDURE — 99232 SBSQ HOSP IP/OBS MODERATE 35: CPT | Performed by: SURGERY

## 2021-04-26 PROCEDURE — APPNB30 APP NON BILLABLE TIME 0-30 MINS: Performed by: PHYSICIAN ASSISTANT

## 2021-04-26 PROCEDURE — 2060000000 HC ICU INTERMEDIATE R&B

## 2021-04-26 PROCEDURE — 2580000003 HC RX 258: Performed by: NURSE PRACTITIONER

## 2021-04-26 PROCEDURE — 83735 ASSAY OF MAGNESIUM: CPT

## 2021-04-26 PROCEDURE — 36592 COLLECT BLOOD FROM PICC: CPT

## 2021-04-26 PROCEDURE — 80048 BASIC METABOLIC PNL TOTAL CA: CPT

## 2021-04-26 RX ORDER — DOCUSATE SODIUM 100 MG/1
100 CAPSULE, LIQUID FILLED ORAL 2 TIMES DAILY
Status: DISCONTINUED | OUTPATIENT
Start: 2021-04-26 | End: 2021-05-04 | Stop reason: HOSPADM

## 2021-04-26 RX ADMIN — METOPROLOL TARTRATE 5 MG: 1 INJECTION, SOLUTION INTRAVENOUS at 17:20

## 2021-04-26 RX ADMIN — LEVOFLOXACIN 500 MG: 5 INJECTION, SOLUTION INTRAVENOUS at 10:16

## 2021-04-26 RX ADMIN — BUDESONIDE 250 MCG: 0.25 SUSPENSION RESPIRATORY (INHALATION) at 08:04

## 2021-04-26 RX ADMIN — METOPROLOL TARTRATE 5 MG: 1 INJECTION, SOLUTION INTRAVENOUS at 21:50

## 2021-04-26 RX ADMIN — SODIUM CHLORIDE SOLN NEBU 3% 15 ML: 3 NEBU SOLN at 11:55

## 2021-04-26 RX ADMIN — IPRATROPIUM BROMIDE AND ALBUTEROL SULFATE 1 AMPULE: .5; 3 SOLUTION RESPIRATORY (INHALATION) at 19:17

## 2021-04-26 RX ADMIN — METOPROLOL TARTRATE 5 MG: 1 INJECTION, SOLUTION INTRAVENOUS at 03:46

## 2021-04-26 RX ADMIN — ARFORMOTEROL TARTRATE 15 MCG: 15 SOLUTION RESPIRATORY (INHALATION) at 08:01

## 2021-04-26 RX ADMIN — DOCUSATE SODIUM 100 MG: 100 CAPSULE, LIQUID FILLED ORAL at 10:21

## 2021-04-26 RX ADMIN — IPRATROPIUM BROMIDE AND ALBUTEROL SULFATE 1 AMPULE: .5; 3 SOLUTION RESPIRATORY (INHALATION) at 03:26

## 2021-04-26 RX ADMIN — IPRATROPIUM BROMIDE AND ALBUTEROL SULFATE 1 AMPULE: .5; 3 SOLUTION RESPIRATORY (INHALATION) at 08:01

## 2021-04-26 RX ADMIN — FAMOTIDINE 20 MG: 10 INJECTION, SOLUTION INTRAVENOUS at 20:28

## 2021-04-26 RX ADMIN — ENOXAPARIN SODIUM 50 MG: 60 INJECTION SUBCUTANEOUS at 08:46

## 2021-04-26 RX ADMIN — DEXTROSE AND SODIUM CHLORIDE: 5; 450 INJECTION, SOLUTION INTRAVENOUS at 10:21

## 2021-04-26 RX ADMIN — DOCUSATE SODIUM 100 MG: 100 CAPSULE, LIQUID FILLED ORAL at 20:28

## 2021-04-26 RX ADMIN — MORPHINE SULFATE 2 MG: 2 INJECTION, SOLUTION INTRAMUSCULAR; INTRAVENOUS at 13:05

## 2021-04-26 RX ADMIN — IPRATROPIUM BROMIDE AND ALBUTEROL SULFATE 1 AMPULE: .5; 3 SOLUTION RESPIRATORY (INHALATION) at 15:48

## 2021-04-26 RX ADMIN — METOPROLOL TARTRATE 5 MG: 1 INJECTION, SOLUTION INTRAVENOUS at 10:17

## 2021-04-26 RX ADMIN — IPRATROPIUM BROMIDE AND ALBUTEROL SULFATE 1 AMPULE: .5; 3 SOLUTION RESPIRATORY (INHALATION) at 11:48

## 2021-04-26 RX ADMIN — BUDESONIDE 250 MCG: 0.25 SUSPENSION RESPIRATORY (INHALATION) at 19:18

## 2021-04-26 RX ADMIN — SODIUM CHLORIDE SOLN NEBU 3% 4 ML: 3 NEBU SOLN at 08:01

## 2021-04-26 RX ADMIN — SODIUM CHLORIDE, PRESERVATIVE FREE 10 ML: 5 INJECTION INTRAVENOUS at 20:28

## 2021-04-26 RX ADMIN — PREDNISONE 40 MG: 20 TABLET ORAL at 08:46

## 2021-04-26 RX ADMIN — ARFORMOTEROL TARTRATE 15 MCG: 15 SOLUTION RESPIRATORY (INHALATION) at 19:18

## 2021-04-26 RX ADMIN — ENOXAPARIN SODIUM 50 MG: 60 INJECTION SUBCUTANEOUS at 20:27

## 2021-04-26 RX ADMIN — MORPHINE SULFATE 2 MG: 2 INJECTION, SOLUTION INTRAMUSCULAR; INTRAVENOUS at 06:06

## 2021-04-26 RX ADMIN — VANCOMYCIN HYDROCHLORIDE 1000 MG: 1 INJECTION, POWDER, LYOPHILIZED, FOR SOLUTION INTRAVENOUS at 11:18

## 2021-04-26 RX ADMIN — SODIUM CHLORIDE SOLN NEBU 3% 15 ML: 3 NEBU SOLN at 19:18

## 2021-04-26 RX ADMIN — FAMOTIDINE 20 MG: 10 INJECTION, SOLUTION INTRAVENOUS at 08:46

## 2021-04-26 RX ADMIN — AMLODIPINE BESYLATE 10 MG: 10 TABLET ORAL at 08:46

## 2021-04-26 ASSESSMENT — PAIN DESCRIPTION - FREQUENCY: FREQUENCY: CONTINUOUS

## 2021-04-26 ASSESSMENT — PAIN DESCRIPTION - PAIN TYPE
TYPE: ACUTE PAIN
TYPE: ACUTE PAIN

## 2021-04-26 ASSESSMENT — PAIN SCALES - GENERAL
PAINLEVEL_OUTOF10: 8
PAINLEVEL_OUTOF10: 3
PAINLEVEL_OUTOF10: 7

## 2021-04-26 ASSESSMENT — PAIN DESCRIPTION - ORIENTATION: ORIENTATION: MID

## 2021-04-26 ASSESSMENT — PAIN DESCRIPTION - ONSET: ONSET: ON-GOING

## 2021-04-26 ASSESSMENT — PAIN DESCRIPTION - DESCRIPTORS
DESCRIPTORS: DISCOMFORT
DESCRIPTORS: DISCOMFORT

## 2021-04-26 ASSESSMENT — PAIN DESCRIPTION - LOCATION: LOCATION: ABDOMEN

## 2021-04-26 NOTE — TELEPHONE ENCOUNTER
M for patient to call back      Tory Mccollum MD  [facility] [address] [facility]   Caller: Unspecified Jessica Newman, 12:31 PM)             Please give patient an appointment to see me 5/5/21 at [address] [place] [address] for hospital follow up.

## 2021-04-26 NOTE — PROGRESS NOTES
Clinical Pharmacy Note  Vancomycin Consult    Antwan De Leon is a 76 y.o. male ordered Vancomycin for HCAP; consult received from Dr. Evonne Mott to manage therapy. Also receiving LQ. Patient Active Problem List   Diagnosis    Abdominal pain    Hypertension    COPD (chronic obstructive pulmonary disease) with emphysema (HCC)    Hypercapnic respiratory failure, chronic (HCC)    Partial small bowel obstruction (HCC)    Nausea and vomiting    Pneumonia    Constipation due to pain medication    Small bowel obstruction (HCC)    Sepsis (HCC)    GI bleed    Aortic aneurysm (HCC)    Gram-negative pneumonia (HCC)    Acute metabolic encephalopathy    SBO [facility] obstruction) ([facility] [address]    Pneumonia due to organism    Severe malnutrition (HCC)    Acute on chronic respiratory failure with hypercapnia (HCC)    COPD with acute exacerbation (HCC)    Mucus plugging of bronchi    Celiac artery stenosis (HCC)    Transaminitis    Hypernatremia       Allergies:  Patient has no known allergies. Temp max:  Temp (24hrs), Av.8 °F (37.1 °C), Min:98.6 °F (37 °C), Max:99.3 °F (37.4 °C)      Recent Labs     21  0402 21  0455 21  0400   WBC 8.6 8.5 5.5       Recent Labs     21  1446 21  0455 21  0400   BUN 36* 30* 16   CREATININE 0.7* 0.7* 0.6*         Intake/Output Summary (Last 24 hours) at 2021 1415  Last data filed at 2021 1228  Gross per 24 hour   Intake 1485 ml   Output 900 ml   Net 585 ml       Culture Results:  mrsa    Ht Readings from Last 1 Encounters:   21 5' 7\" (1.702 m)        Wt Readings from Last 1 Encounters:   21 114 lb 10.2 oz (52 kg)         Estimated Creatinine Clearance: 78 mL/min (A) (based on SCr of 0.6 mg/dL (L)). Assessment/Plan:  Vancomycin 1000 mg IV x1 ordered. Regimen projects a trough level of 15-20 mg/L. Level ordered for 21 0600. Thank you for the consult.    Electronically signed by Becky Vela [facility] on 2021 at 2:16 PM

## 2021-04-26 NOTE — PLAN OF CARE
Problem: Pain:  Goal: Pain level will decrease  Description: Pain level will decrease  4/26/2021 0927 by Asif Velazquez RN  Outcome: Ongoing     Problem: Pain:  Goal: Control of acute pain  Description: Control of acute pain  4/26/2021 0927 by Asif Velazquez RN  Outcome: Ongoing     Problem: Pain:  Goal: Control of chronic pain  Description: Control of chronic pain  4/26/2021 0927 by Asif Velazquez RN  Outcome: Ongoing     Problem: Pain:  Goal: Patient's pain/discomfort is manageable  Description: Patient's pain/discomfort is manageable  4/26/2021 0927 by Asif Velazquez RN  Outcome: Ongoing  Assessing pain using appropriate pain rating scale q shift and PRN. Medicating pt as prescribed and indicated. Offering pt non-pharmacologic pain interventions. Reassessing pain and pts response to pain intervention. Problem: Nutrition  Goal: Optimal nutrition therapy  4/26/2021 0927 by Asif Velazquez RN  Outcome: Ongoing  Encouraging pt to eat at least 50% of all meals. Assisting with feeding when needed. collaborating with dietician for optimal nutrition.     Problem: Infection:  Goal: Will remain free from infection  Description: Will remain free from infection  4/26/2021 0927 by Asif Velazquez RN  Outcome: Ongoing     Problem: Safety:  Goal: Free from accidental physical injury  Description: Free from accidental physical injury  4/26/2021 0927 by Asif Velazquez RN  Outcome: Ongoing     Problem: Safety:  Goal: Free from intentional harm  Description: Free from intentional harm  4/26/2021 0927 by Asif Velazquez RN  Outcome: Ongoing     Problem: Daily Care:  Goal: Daily care needs are met  Description: Daily care needs are met  4/26/2021 4535 by Asif Velazquez RN  Outcome: Ongoing     Problem: Skin Integrity:  Goal: Skin integrity will stabilize  Description: Skin integrity will stabilize  4/26/2021 0927 by Asif Velazquez RN  Outcome: Ongoing     Problem: Discharge Planning:  Goal: Patients continuum of care needs are met  Description: Patients continuum of care needs are met  4/26/2021 9433 by Asif Velazquez RN  Outcome: Ongoing     Problem: Skin Integrity:  Goal: Will show no infection signs and symptoms  Description: Will show no infection signs and symptoms  4/26/2021 0927 by Asif Velazquez RN  Outcome: Ongoing     Problem: Skin Integrity:  Goal: Absence of new skin breakdown  Description: Absence of new skin breakdown  4/26/2021 0927 by Asif Velazquez RN  Outcome: Ongoing  Assessing jennifer scale Q shift. Performing skin assessments every shift. Maintaining dry and clean skin. Promoting adequate nutritional intake. Heels elevated off bed. Performing skin care q shift. Utilizing lift equipment when indicated. Problem: Falls - Risk of:  Goal: Will remain free from falls  Description: Will remain free from falls  4/26/2021 0927 by Asif Velazquez RN  Outcome: Ongoing     Problem: Falls - Risk of:  Goal: Absence of physical injury  Description: Absence of physical injury  4/26/2021 0574 by Asif Velazquez RN  Outcome: Ongoing  Bed alarm on, bed in lowest position, wheels locked. Fall risk assessment completed every shift. Nonskid socks on, fall sign posted. Pt educated on use of call light. No falls on this shift.

## 2021-04-26 NOTE — CARE COORDINATION
Discharge Planning:  KARENA contacted Anita at [facility] to give him an update on this pt. Message left.   Chandra Gunn  [phone]  Electronically signed by Terese Sandhoff on 4/26/2021 at 12:43 PM

## 2021-04-26 NOTE — PROGRESS NOTES
[facility] [phone]                                     Daily Progress Note                                                         Pt Name: Malu Flores Day  Medical Record Number: [id number]  Date of Birth 1945   Today's Date: 4/26/2021  CC: SBO vs ileus    ASSESSMENT/PLAN  77 yo male with severe COPD, bilateral LL pneumonia, SBO vs ileus     -SBO vs ileus - Pt pulled out NG 4/22.  -Denies any nausea or emesis. Tolerating some clears. + flatus, no BM. Will repeat films. If improved, will advance to full liquids.     -Continue IV hydration. Poor surgical candidate.      -Severe COPD, bilateral LL pneumonia, hypercarbia - BIPAP per pulmonology. On vancomycin, cefepime, flagyl. Steroids per pulmonology     -Hx of HTN, weight loss, deconditioning - per primary team.     -Severe malnutrition - will start supplements when tolerating diet       SUBJECTIVE  Malu Flores is improved today. Denies nausea. Tolerating some clears. Passing flatus, no BM. OBJECTIVE  VITALS:  height is 5' 7\" (1.702 m) and weight is 114 lb 10.2 oz (52 kg). His oral temperature is 98.6 °F (37 °C). His blood pressure is 155/67 (abnormal) and his pulse is 89. His respiration is 18 and oxygen saturation is 93%. INTAKE/OUTPUT:      Intake/Output Summary (Last 24 hours) at 4/26/2021 1008  Last data filed at 4/26/2021 0600  Gross per 24 hour   Intake 1445 ml   Output 525 ml   Net 920 ml     GENERAL: alert, cooperative, no distress  LUNGS: normal respiratory effort, no accessory muscle use  HEART: normal rate and regular rhythm  ABDOMEN:  Soft, minimally distended, NT. Scars consistent with surgical history. EXTREMITY: no cyanosis, clubbing or edema    I/O last 3 completed shifts: In: 4769 [P.O.:420;  I.V.:1185]  Out: 825 [Urine:825]      LABS  Recent Labs     04/26/21  0400   WBC 5.5   HGB 10.3*   HCT 31.8*         K 4.0      CO2 34*   BUN 16 CREATININE 0.6*   MG 2.20   PHOS 2.2*   CALCIUM 8.7   AST 15   *   BILITOT 0.7   BILIDIR <0.2       EDUCATION  Patient educated about Disease Process, Medications, Smoking Cessation, Oxygenation, Incentive Spirometry and Deep Breath and Cough, Diabetes, Hyperlipidemia, Smoking Cessation, Nutrition, Exercise and Hypertension    Electronically signed by Tana Patel MD on 4/26/2021 at 10:08 AM

## 2021-04-26 NOTE — PROGRESS NOTES
Hospitalist Progress Note      PCP: Jerad Lao MD    Date of Admission: 4/21/2021      Subjective: Patient seen and evaluated at the bedside, denies chest pain shortness of breath      Medications:  Reviewed    Infusion Medications    sodium chloride      dextrose 5 % and 0.45 % NaCl 50 mL/hr at 04/26/21 1021     Scheduled Medications    docusate sodium  100 mg Oral BID    vancomycin (VANCOCIN) intermittent dosing (placeholder)   Other RX Placeholder    amLODIPine  10 mg Oral Daily    predniSONE  40 mg Oral Daily    sodium chloride flush  5-40 mL Intravenous 2 times per day    metoprolol  5 mg Intravenous Q6H    enoxaparin  1 mg/kg Subcutaneous BID    levofloxacin  500 mg Intravenous Q24H    ipratropium-albuterol  1 ampule Inhalation Q4H    famotidine (PEPCID) injection  20 mg Intravenous BID    budesonide  250 mcg Nebulization BID    Arformoterol Tartrate  15 mcg Nebulization BID    sodium chloride (Inhalant)  15 mL Nebulization TID     PRN Meds: sodium chloride flush, sodium chloride, morphine, phenol, albuterol sulfate HFA, albuterol, promethazine **OR** ondansetron      Intake/Output Summary (Last 24 hours) at 4/26/2021 1659  Last data filed at 4/26/2021 1228  Gross per 24 hour   Intake 1425 ml   Output 900 ml   Net 525 ml       Physical Exam Performed:    BP (!) 159/59   Pulse 91   Temp 98.9 °F (37.2 °C) (Oral)   Resp 18   Ht 5' 7\" (1.702 m)   Wt 114 lb 10.2 oz (52 kg)   SpO2 99%   BMI 17.96 kg/m²     General appearance: No apparent distress, lying in bed comfortably  Neck: Supple  Respiratory:  Normal respiratory effort. Clear to auscultation, bilaterally without Rales/Wheezes/Rhonchi. Cardiovascular: Regular rate and rhythm with normal S1/S2 without murmurs, rubs or gallops. Abdomen: Soft, tenderness noted, less distended   Musculoskeletal: No clubbing, cyanosis  Skin: Skin color, texture, turgor normal.  No rashes or lesions.   Neurologic:  No focal weakness   Psychiatric: Alert and oriented  Capillary Refill: Brisk,3 seconds, normal   Peripheral Pulses: +2 palpable, equal bilaterally       Labs:   Recent Labs     04/24/21  0402 04/25/21  0455 04/26/21  0400   WBC 8.6 8.5 5.5   HGB 10.7* 10.6* 10.3*   HCT 33.4* 32.6* 31.8*    167 153     Recent Labs     04/24/21  1446 04/25/21  0455 04/25/21  1129 04/26/21  0400   * 144  --  142   K 4.5 4.6  --  4.0    105  --  101   CO2 33* 34*  --  34*   BUN 36* 30*  --  16   CREATININE 0.7* 0.7*  --  0.6*   CALCIUM 8.9 8.6  --  8.7   PHOS  --  2.4* 2.1* 2.2*     Recent Labs     04/24/21  0402 04/25/21  0455 04/26/21  0400   AST 44* 21 15   * 149* 101*   BILIDIR <0.2 <0.2 <0.2   BILITOT 0.5 0.6 0.7   ALKPHOS 161* 141* 132*     No results for input(s): INR in the last 72 hours. No results for input(s): Facundo Journey in the last 72 hours. Urinalysis:      Lab Results   Component Value Date    NITRU Negative 04/21/2021    WBCUA 7 04/21/2021    BACTERIA 1+ 06/17/2014    RBCUA 2 04/21/2021    RBCUA 2 11/19/2011    BLOODU TRACE 04/21/2021    SPECGRAV >1.030 04/21/2021    GLUCOSEU Negative 04/21/2021       Radiology:  XR ABDOMEN (2 VIEWS)   Final Result   Resolving ileus         US ABDOMEN LIMITED   Final Result   Normal sonographic appearance the liver. Marked extrahepatic biliary   dilatation without significant intrahepatic biliary dilatation. Dilatation   bleed related to prior cholecystectomy and patient's age. No acute   abnormality. XR ABDOMEN (KUB) (SINGLE AP VIEW)   Final Result   Nasogastric tube projects to the proximal stomach, normal position. Persistent dilated loops of small bowel in the upper abdomen. XR CHEST PORTABLE   Final Result   No acute cardiac or pulmonary disease. NG tube extends into the stomach. CTA ABDOMEN PELVIS W CONTRAST   Final Result   1.  Persisting diffuse small bowel dilatation with fluid-filled lumen   consistent with small bowel obstruction diameter, as discussed above. 3. Severe atherosclerotic disease involving the bilateral common, internal   and external iliac arterial vasculature and branches with associated   multifocal high-grade stenosis. 4. Celiac trunk origin focal high-grade stenosis secondary to marked   atherosclerotic disease. 5. Severe sigmoid colon diverticulosis without evidence of diverticulitis. 6. Bilateral lower lung lobe scattered \"tree-in-bud\" opacification pattern   consistent with bronchiolitis/small airway disease with posterior lower lung   lobe multifocal mild consolidation consistent with pneumonia. 7. Cholecystectomy with marked reservoir effect, as discussed above. 8. Splenic chronic granulomatous disease. 9. Mild diffuse distention of esophagus with fluid-filled lumen suggesting   association with gastroesophageal reflux. Recommend clinical correlation. 10. Symmetric bilateral renal cortical volume loss suggesting association   with senescent change versus chronic medical renal disease. 11. Redemonstration of suspected left adrenal gland adenoma. RECOMMENDATIONS:   4.4 cm infrarenal abdominal aortic aneurysm. Recommend follow-up every 12   months and vascular consultation. Reference: J Am Sophie Radiol 1923;45:067-832. Assessment/Plan:    Active Hospital Problems    Diagnosis    Hypernatremia [E87.0]    Transaminitis [R74.01]    Aortic aneurysm (HCC) [I71.9]    Gram-negative pneumonia (HCC) [J15.6]    Acute metabolic encephalopathy [Z17.33]    Severe malnutrition (HCC) [E43]    SBO (small bowel obstruction) ([address] [K56.609]    Pneumonia due to organism [J18.9]    Acute on chronic respiratory failure with hypercapnia (HCC) [J96.22]    COPD with acute exacerbation (HCC) [J44.1]    Mucus plugging of bronchi [J98.09]    Celiac artery stenosis ([address] [I77.4]    Sepsis ([address] [A41.9]    Small bowel obstruction ([address] [K56.609]    Hypertension [I10]     1.  Sepsis, likely due to

## 2021-04-26 NOTE — PLAN OF CARE
Problem: Pain:  Goal: Control of chronic pain  Description: Control of chronic pain  Outcome: Ongoing     Problem: Daily Care:  Goal: Daily care needs are met  Description: Daily care needs are met  Outcome: Ongoing     Problem: Discharge Planning:  Goal: Patients continuum of care needs are met  Description: Patients continuum of care needs are met  Outcome: Ongoing     Problem: Skin Integrity:  Goal: Will show no infection signs and symptoms  Description: Will show no infection signs and symptoms  Outcome: Ongoing     Problem: Falls - Risk of:  Goal: Will remain free from falls  Description: Will remain free from falls  Outcome: Ongoing

## 2021-04-27 LAB
ALBUMIN SERPL-MCNC: 3.1 G/DL (ref 3.4–5)
ALP BLD-CCNC: 107 U/L (ref 40–129)
ALT SERPL-CCNC: 79 U/L (ref 10–40)
ANION GAP SERPL CALCULATED.3IONS-SCNC: 3 MMOL/L (ref 3–16)
AST SERPL-CCNC: 21 U/L (ref 15–37)
BASOPHILS ABSOLUTE: 0 K/UL (ref 0–0.2)
BASOPHILS RELATIVE PERCENT: 0.1 %
BILIRUB SERPL-MCNC: 0.5 MG/DL (ref 0–1)
BILIRUBIN DIRECT: <0.2 MG/DL (ref 0–0.3)
BILIRUBIN, INDIRECT: ABNORMAL MG/DL (ref 0–1)
BUN BLDV-MCNC: 12 MG/DL (ref 7–20)
CALCIUM SERPL-MCNC: 8.3 MG/DL (ref 8.3–10.6)
CHLORIDE BLD-SCNC: 100 MMOL/L (ref 99–110)
CO2: 34 MMOL/L (ref 21–32)
CREAT SERPL-MCNC: 0.6 MG/DL (ref 0.8–1.3)
CULTURE, RESPIRATORY: ABNORMAL
CULTURE, RESPIRATORY: ABNORMAL
EOSINOPHILS ABSOLUTE: 0 K/UL (ref 0–0.6)
EOSINOPHILS RELATIVE PERCENT: 0.2 %
GFR AFRICAN AMERICAN: >60
GFR NON-AFRICAN AMERICAN: >60
GLUCOSE BLD-MCNC: 115 MG/DL (ref 70–99)
GRAM STAIN RESULT: ABNORMAL
HCT VFR BLD CALC: 29.5 % (ref 40.5–52.5)
HEMOGLOBIN: 9.8 G/DL (ref 13.5–17.5)
LYMPHOCYTES ABSOLUTE: 0.4 K/UL (ref 1–5.1)
LYMPHOCYTES RELATIVE PERCENT: 8.4 %
MAGNESIUM: 2 MG/DL (ref 1.8–2.4)
MCH RBC QN AUTO: 33 PG (ref 26–34)
MCHC RBC AUTO-ENTMCNC: 33.2 G/DL (ref 31–36)
MCV RBC AUTO: 99.5 FL (ref 80–100)
MONOCYTES ABSOLUTE: 0.5 K/UL (ref 0–1.3)
MONOCYTES RELATIVE PERCENT: 10 %
NEUTROPHILS ABSOLUTE: 3.8 K/UL (ref 1.7–7.7)
NEUTROPHILS RELATIVE PERCENT: 81.3 %
ORGANISM: ABNORMAL
PDW BLD-RTO: 13.3 % (ref 12.4–15.4)
PHOSPHORUS: 2.3 MG/DL (ref 2.5–4.9)
PLATELET # BLD: 142 K/UL (ref 135–450)
PMV BLD AUTO: 8.4 FL (ref 5–10.5)
POTASSIUM SERPL-SCNC: 4.2 MMOL/L (ref 3.5–5.1)
RBC # BLD: 2.97 M/UL (ref 4.2–5.9)
SODIUM BLD-SCNC: 137 MMOL/L (ref 136–145)
TOTAL PROTEIN: 5.3 G/DL (ref 6.4–8.2)
VANCOMYCIN RANDOM: 6.8 UG/ML
WBC # BLD: 4.7 K/UL (ref 4–11)

## 2021-04-27 PROCEDURE — 85025 COMPLETE CBC W/AUTO DIFF WBC: CPT

## 2021-04-27 PROCEDURE — 94640 AIRWAY INHALATION TREATMENT: CPT

## 2021-04-27 PROCEDURE — 2580000003 HC RX 258: Performed by: INTERNAL MEDICINE

## 2021-04-27 PROCEDURE — 6360000002 HC RX W HCPCS: Performed by: NURSE PRACTITIONER

## 2021-04-27 PROCEDURE — 6370000000 HC RX 637 (ALT 250 FOR IP): Performed by: NURSE PRACTITIONER

## 2021-04-27 PROCEDURE — 6370000000 HC RX 637 (ALT 250 FOR IP): Performed by: INTERNAL MEDICINE

## 2021-04-27 PROCEDURE — 83735 ASSAY OF MAGNESIUM: CPT

## 2021-04-27 PROCEDURE — 36592 COLLECT BLOOD FROM PICC: CPT

## 2021-04-27 PROCEDURE — 80202 ASSAY OF VANCOMYCIN: CPT

## 2021-04-27 PROCEDURE — 6360000002 HC RX W HCPCS: Performed by: INTERNAL MEDICINE

## 2021-04-27 PROCEDURE — 2060000000 HC ICU INTERMEDIATE R&B

## 2021-04-27 PROCEDURE — 2700000000 HC OXYGEN THERAPY PER DAY

## 2021-04-27 PROCEDURE — 84100 ASSAY OF PHOSPHORUS: CPT

## 2021-04-27 PROCEDURE — 2500000003 HC RX 250 WO HCPCS: Performed by: NURSE PRACTITIONER

## 2021-04-27 PROCEDURE — 80076 HEPATIC FUNCTION PANEL: CPT

## 2021-04-27 PROCEDURE — APPNB30 APP NON BILLABLE TIME 0-30 MINS: Performed by: PHYSICIAN ASSISTANT

## 2021-04-27 PROCEDURE — 99232 SBSQ HOSP IP/OBS MODERATE 35: CPT | Performed by: SURGERY

## 2021-04-27 PROCEDURE — 80048 BASIC METABOLIC PNL TOTAL CA: CPT

## 2021-04-27 PROCEDURE — 94761 N-INVAS EAR/PLS OXIMETRY MLT: CPT

## 2021-04-27 PROCEDURE — 6370000000 HC RX 637 (ALT 250 FOR IP): Performed by: SURGERY

## 2021-04-27 PROCEDURE — 2500000003 HC RX 250 WO HCPCS: Performed by: INTERNAL MEDICINE

## 2021-04-27 PROCEDURE — APPSS15 APP SPLIT SHARED TIME 0-15 MINUTES: Performed by: PHYSICIAN ASSISTANT

## 2021-04-27 RX ADMIN — IPRATROPIUM BROMIDE AND ALBUTEROL SULFATE 1 AMPULE: .5; 3 SOLUTION RESPIRATORY (INHALATION) at 16:39

## 2021-04-27 RX ADMIN — IPRATROPIUM BROMIDE AND ALBUTEROL SULFATE 1 AMPULE: .5; 3 SOLUTION RESPIRATORY (INHALATION) at 04:15

## 2021-04-27 RX ADMIN — ENOXAPARIN SODIUM 50 MG: 60 INJECTION SUBCUTANEOUS at 10:03

## 2021-04-27 RX ADMIN — BUDESONIDE 250 MCG: 0.25 SUSPENSION RESPIRATORY (INHALATION) at 08:48

## 2021-04-27 RX ADMIN — METOPROLOL TARTRATE 5 MG: 1 INJECTION, SOLUTION INTRAVENOUS at 15:53

## 2021-04-27 RX ADMIN — AMLODIPINE BESYLATE 10 MG: 10 TABLET ORAL at 10:10

## 2021-04-27 RX ADMIN — IPRATROPIUM BROMIDE AND ALBUTEROL SULFATE 1 AMPULE: .5; 3 SOLUTION RESPIRATORY (INHALATION) at 00:03

## 2021-04-27 RX ADMIN — MORPHINE SULFATE 2 MG: 2 INJECTION, SOLUTION INTRAMUSCULAR; INTRAVENOUS at 00:24

## 2021-04-27 RX ADMIN — DEXTROSE AND SODIUM CHLORIDE: 5; 450 INJECTION, SOLUTION INTRAVENOUS at 08:08

## 2021-04-27 RX ADMIN — DOCUSATE SODIUM 100 MG: 100 CAPSULE, LIQUID FILLED ORAL at 20:58

## 2021-04-27 RX ADMIN — IPRATROPIUM BROMIDE AND ALBUTEROL SULFATE 1 AMPULE: .5; 3 SOLUTION RESPIRATORY (INHALATION) at 12:23

## 2021-04-27 RX ADMIN — Medication 10 ML: at 10:24

## 2021-04-27 RX ADMIN — FAMOTIDINE 20 MG: 10 INJECTION, SOLUTION INTRAVENOUS at 10:11

## 2021-04-27 RX ADMIN — SODIUM CHLORIDE, PRESERVATIVE FREE 10 ML: 5 INJECTION INTRAVENOUS at 07:57

## 2021-04-27 RX ADMIN — BUDESONIDE 250 MCG: 0.25 SUSPENSION RESPIRATORY (INHALATION) at 19:44

## 2021-04-27 RX ADMIN — VANCOMYCIN HYDROCHLORIDE 1000 MG: 1 INJECTION, POWDER, LYOPHILIZED, FOR SOLUTION INTRAVENOUS at 08:09

## 2021-04-27 RX ADMIN — METOPROLOL TARTRATE 5 MG: 1 INJECTION, SOLUTION INTRAVENOUS at 10:11

## 2021-04-27 RX ADMIN — METOPROLOL TARTRATE 5 MG: 1 INJECTION, SOLUTION INTRAVENOUS at 04:08

## 2021-04-27 RX ADMIN — ARFORMOTEROL TARTRATE 15 MCG: 15 SOLUTION RESPIRATORY (INHALATION) at 19:43

## 2021-04-27 RX ADMIN — SODIUM CHLORIDE SOLN NEBU 3% 15 ML: 3 NEBU SOLN at 12:23

## 2021-04-27 RX ADMIN — ARFORMOTEROL TARTRATE 15 MCG: 15 SOLUTION RESPIRATORY (INHALATION) at 08:48

## 2021-04-27 RX ADMIN — METOPROLOL TARTRATE 5 MG: 1 INJECTION, SOLUTION INTRAVENOUS at 22:12

## 2021-04-27 RX ADMIN — SODIUM CHLORIDE SOLN NEBU 3% 15 ML: 3 NEBU SOLN at 19:43

## 2021-04-27 RX ADMIN — MORPHINE SULFATE 2 MG: 2 INJECTION, SOLUTION INTRAMUSCULAR; INTRAVENOUS at 07:52

## 2021-04-27 RX ADMIN — IPRATROPIUM BROMIDE AND ALBUTEROL SULFATE 1 AMPULE: .5; 3 SOLUTION RESPIRATORY (INHALATION) at 19:43

## 2021-04-27 RX ADMIN — IPRATROPIUM BROMIDE AND ALBUTEROL SULFATE 1 AMPULE: .5; 3 SOLUTION RESPIRATORY (INHALATION) at 08:48

## 2021-04-27 RX ADMIN — MORPHINE SULFATE 2 MG: 2 INJECTION, SOLUTION INTRAMUSCULAR; INTRAVENOUS at 20:58

## 2021-04-27 RX ADMIN — MORPHINE SULFATE 2 MG: 2 INJECTION, SOLUTION INTRAMUSCULAR; INTRAVENOUS at 15:52

## 2021-04-27 RX ADMIN — ENOXAPARIN SODIUM 50 MG: 60 INJECTION SUBCUTANEOUS at 20:57

## 2021-04-27 RX ADMIN — DOCUSATE SODIUM 100 MG: 100 CAPSULE, LIQUID FILLED ORAL at 10:10

## 2021-04-27 RX ADMIN — SODIUM CHLORIDE, PRESERVATIVE FREE 10 ML: 5 INJECTION INTRAVENOUS at 20:57

## 2021-04-27 RX ADMIN — LEVOFLOXACIN 500 MG: 5 INJECTION, SOLUTION INTRAVENOUS at 10:10

## 2021-04-27 RX ADMIN — FAMOTIDINE 20 MG: 10 INJECTION, SOLUTION INTRAVENOUS at 20:58

## 2021-04-27 RX ADMIN — PREDNISONE 40 MG: 20 TABLET ORAL at 10:12

## 2021-04-27 RX ADMIN — SODIUM CHLORIDE SOLN NEBU 3% 15 ML: 3 NEBU SOLN at 08:48

## 2021-04-27 ASSESSMENT — PAIN DESCRIPTION - LOCATION
LOCATION: ABDOMEN
LOCATION: ABDOMEN

## 2021-04-27 ASSESSMENT — PAIN DESCRIPTION - FREQUENCY
FREQUENCY: INTERMITTENT
FREQUENCY: CONTINUOUS

## 2021-04-27 ASSESSMENT — PAIN DESCRIPTION - PROGRESSION
CLINICAL_PROGRESSION: GRADUALLY IMPROVING

## 2021-04-27 ASSESSMENT — PAIN DESCRIPTION - ORIENTATION
ORIENTATION: MID
ORIENTATION: MID

## 2021-04-27 ASSESSMENT — PAIN SCALES - GENERAL
PAINLEVEL_OUTOF10: 6
PAINLEVEL_OUTOF10: 5
PAINLEVEL_OUTOF10: 6
PAINLEVEL_OUTOF10: 8
PAINLEVEL_OUTOF10: 8
PAINLEVEL_OUTOF10: 3
PAINLEVEL_OUTOF10: 5
PAINLEVEL_OUTOF10: 0

## 2021-04-27 ASSESSMENT — PAIN DESCRIPTION - ONSET
ONSET: PROGRESSIVE
ONSET: ON-GOING

## 2021-04-27 ASSESSMENT — PAIN DESCRIPTION - PAIN TYPE
TYPE: ACUTE PAIN
TYPE: ACUTE PAIN

## 2021-04-27 ASSESSMENT — PAIN DESCRIPTION - DESCRIPTORS
DESCRIPTORS: SHARP
DESCRIPTORS: DISCOMFORT;PRESSURE

## 2021-04-27 NOTE — PROGRESS NOTES
[facility] [phone]                                     Daily Progress Note                                                         Pt Name: Geremias Savage Day  Medical Record Number: [id number]  Date of Birth 1945   Today's Date: 4/27/2021  CC: SBO vs ileus    ASSESSMENT/PLAN  77 yo male with severe COPD, bilateral LL pneumonia, SBO vs ileus     -SBO vs ileus  - Pt pulled out NG 4/22.  -Denies any nausea or emesis. Tolerating clears. + flatus, and BM.    -abdominal xray's done yesterday showed his ileus to be resolving   -Tolerating clear liquids will advance to full liquids as tolerated    -Continue IV hydration. Poor surgical candidate.      -Severe COPD, bilateral LL pneumonia, hypercarbia - BIPAP per pulmonology. On vancomycin, cefepime, flagyl. Steroids per pulmonology     -Hx of HTN, weight loss, deconditioning - per primary team.     -Severe malnutrition - will start supplements when tolerating diet       SUBJECTIVE  Geremias Savage is improved today. Denies nausea. Tolerating some clears. Passing flatus, no BM. OBJECTIVE  VITALS:  height is 5' 7\" (1.702 m) and weight is 113 lb 12.1 oz (51.6 kg). His oral temperature is 98.2 °F (36.8 °C). His blood pressure is 144/67 (abnormal) and his pulse is 80. His respiration is 18 and oxygen saturation is 97%. INTAKE/OUTPUT:      Intake/Output Summary (Last 24 hours) at 4/27/2021 1032  Last data filed at 4/27/2021 0934  Gross per 24 hour   Intake 1910 ml   Output 1725 ml   Net 185 ml     GENERAL: alert, cooperative, no distress  LUNGS: normal respiratory effort, no accessory muscle use  HEART: normal rate and regular rhythm  ABDOMEN:  Soft, minimally distended, NT. Scars consistent with surgical history. EXTREMITY: no cyanosis, clubbing or edema    I/O last 3 completed shifts:   In: 1660 [P.O.:360; I.V.:1200; IV Piggyback:100]  Out: [facility] Labs     04/27/21  7535 WBC 4.7   HGB 9.8*   HCT 29.5*         K 4.2      CO2 34*   BUN 12   CREATININE 0.6*   MG 2.00   PHOS 2.3*   CALCIUM 8.3   AST 21   ALT 79*   BILITOT 0.5   BILIDIR <0.2       EDUCATION  Patient educated about Disease Process, Medications, Smoking Cessation, Oxygenation, Incentive Spirometry and Deep Breath and Cough, Diabetes, Hyperlipidemia, Smoking Cessation, Nutrition, Exercise and Hypertension    Electronically signed by Rubina Peterson PA-C on 4/27/2021 at 10:32 AM    Agree with above note. The patient was personally seen and examined. Willie Jim Day is doing better today.  + BM. Tolerating clears. Abd soft, minimally distended, NT    WBC 4.7    Abd films from yesterday personally reviewed, showing improved small bowel obstruction    A/P: pSBO, resolving, PNA, severe COPD exacerbation    Advance to full liquids.   Continue bowel regimen    Steriods/abx for PNA and COPD exacerbation    Tana Patel MD

## 2021-04-27 NOTE — PROGRESS NOTES
Comprehensive Nutrition Assessment    Type and Reason for Visit:  Reassess    Nutrition Recommendations/Plan:   Full Liquids per provider   Ensure TID  Will monitor nutritional adequacy, nutrition-related labs, weights, BMs, and clinical progress     Nutrition Assessment:  Follow-up. Ileus now resolving. Pt now on liquids. Tolerating Clears well (though not taking much) and advanced to Fulls this morning. BM today. Will order ONS. Malnutrition Assessment:  Malnutrition Status:  Severe malnutrition    Context:  Chronic Illness     Findings of the 6 clinical characteristics of malnutrition:  Energy Intake:  Mild decrease in energy intake (Comment)  Weight Loss:  Unable to assess     Body Fat Loss:  7 - Severe body fat loss Buccal region, Orbital, Triceps   Muscle Mass Loss:  7 - Severe muscle mass loss Temples (temporalis), Clavicles (pectoralis & deltoids), Hand (interosseous)  Fluid Accumulation:  No significant fluid accumulation     Strength:  Not Performed    Estimated Daily Nutrient Needs:  Energy (kcal):  2177-5861 kcal (30-35 kcal/kg CBW- underweight criteria); Weight Used for Energy Requirements:  Current     Protein (g):  75 gm (2gm/kg CBW-underweight criteria); Weight Used for Protein Requirements:  Current        Fluid (ml/day):  Per MD; Method Used for Fluid Requirements:         Nutrition Related Findings:  BM today; reviewed labs      Wounds:  None       Current Nutrition Therapies:    DIET FULL LIQUID;     Anthropometric Measures:  · Height: 5' 7\" (170.2 cm)  · Current Body Weight: 113 lb (51.3 kg)   · Admission Body Weight: 88 lb (39.9 kg)    · Ideal Body Weight: 148 lbs; % Ideal Body Weight 75 %   · BMI: 17.7  · Adjusted Body Weight:  ; No Adjustment   · BMI Categories: Underweight (BMI less than 22) age over 72       Nutrition Diagnosis:   · Severe malnutrition related to inadequate protein-energy intake as evidenced by severe loss of subcutaneous fat, severe muscle loss(BMI of 13.7) Nutrition Interventions:   Food and/or Nutrient Delivery:  Continue Current Diet, Start Oral Nutrition Supplement  Nutrition Education/Counseling:  No recommendation at this time   Coordination of Nutrition Care:  Continue to monitor while inpatient    Goals:   Tolerate the most appropriate form of nutrition per provider       Nutrition Monitoring and Evaluation:   Behavioral-Environmental Outcomes:  None Identified   Food/Nutrient Intake Outcomes:  Diet Advancement/Tolerance, Food and Nutrient Intake, Supplement Intake  Physical Signs/Symptoms Outcomes:  Biochemical Data, Weight, Skin, Nutrition Focused Physical Findings, Nausea or Vomiting     Discharge Planning:    Continue Oral Nutrition Supplement     Electronically signed by Susi Stevens RD, LD on 4/27/21 at 10:59 AM EDT    Contact: [phone]

## 2021-04-27 NOTE — PLAN OF CARE
Problem: Pain:  Goal: Pain level will decrease  Description: Pain level will decrease  4/27/2021 0904 by Jaxon Owusu RN  Outcome: Ongoing     Problem: Pain:  Goal: Control of acute pain  Description: Control of acute pain  4/27/2021 0904 by Jaxon Owusu RN  Outcome: Ongoing     Problem: Pain:  Goal: Control of chronic pain  Description: Control of chronic pain  4/27/2021 0904 by Jaxon Owusu RN  Outcome: Ongoing     Problem: Pain:  Goal: Patient's pain/discomfort is manageable  Description: Patient's pain/discomfort is manageable  4/27/2021 0904 by Jaxon Owusu RN  Outcome: Ongoing  Assessing pain using appropriate pain rating scale q shift and PRN. Medicating pt as prescribed and indicated. Offering pt non-pharmacologic pain interventions. Reassessing pain and pts response to pain intervention. Problem: Nutrition  Goal: Optimal nutrition therapy  4/27/2021 0904 by Jaxon Ouwsu RN  Outcome: Ongoing  Encouraging pt to eat at least 50% of all meals. Assisting with feeding when needed. collaborating with dietician for optimal nutrition.     Problem: Infection:  Goal: Will remain free from infection  Description: Will remain free from infection  4/27/2021 0904 by Jaxon Owusu RN  Outcome: Ongoing     Problem: Safety:  Goal: Free from accidental physical injury  Description: Free from accidental physical injury  4/27/2021 0904 by Jaxon Owusu RN  Outcome: Ongoing     Problem: Safety:  Goal: Free from intentional harm  Description: Free from intentional harm  4/27/2021 0904 by Jaxon Owusu RN  Outcome: Ongoing     Problem: Daily Care:  Goal: Daily care needs are met  Description: Daily care needs are met  4/27/2021 0904 by Jaxon Owusu RN  Outcome: Ongoing     Problem: Skin Integrity:  Goal: Skin integrity will stabilize  Description: Skin integrity will stabilize  4/27/2021 0904 by Jaxon Owusu RN  Outcome: Ongoing     Problem: Discharge Planning:  Goal: Patients continuum of care needs are met  Description: Patients continuum of care needs are met  4/27/2021 0904 by Robinson Davis RN  Outcome: Ongoing     Problem: Skin Integrity:  Goal: Will show no infection signs and symptoms  Description: Will show no infection signs and symptoms  4/27/2021 0904 by Robinson Davis RN  Outcome: Ongoing     Problem: Skin Integrity:  Goal: Absence of new skin breakdown  Description: Absence of new skin breakdown  4/27/2021 0904 by Robinson Davis RN  Outcome: Ongoing  Assessing jennifer scale Q shift. Performing skin assessments every shift. Maintaining dry and clean skin. Promoting adequate nutritional intake. Heels elevated off bed. Performing skin care q shift. Utilizing lift equipment when indicated. Problem: Falls - Risk of:  Goal: Will remain free from falls  Description: Will remain free from falls  4/27/2021 0904 by Robinson Daivs RN  Outcome: Ongoing     Problem: Falls - Risk of:  Goal: Absence of physical injury  Description: Absence of physical injury  4/27/2021 0904 by Robinson Davis RN  Outcome: Ongoing   Bed alarm on, bed in lowest position, wheels locked. Fall risk assessment completed every shift. Nonskid socks on, fall sign posted. Pt educated on use of call light. No falls on this shift.

## 2021-04-27 NOTE — PROGRESS NOTES
Hospitalist Progress Note      PCP: Becky Quiroz MD    Date of Admission: 4/21/2021      Subjective: Patient seen and examined. Feels better. Had bowel movement. Starting full liquid diet today. Medications:  Reviewed    Infusion Medications    sodium chloride      dextrose 5 % and 0.45 % NaCl 50 mL/hr at 04/27/21 0808     Scheduled Medications    docusate sodium  100 mg Oral BID    vancomycin (VANCOCIN) intermittent dosing (placeholder)   Other RX Placeholder    amLODIPine  10 mg Oral Daily    predniSONE  40 mg Oral Daily    sodium chloride flush  5-40 mL Intravenous 2 times per day    metoprolol  5 mg Intravenous Q6H    enoxaparin  1 mg/kg Subcutaneous BID    levofloxacin  500 mg Intravenous Q24H    ipratropium-albuterol  1 ampule Inhalation Q4H    famotidine (PEPCID) injection  20 mg Intravenous BID    budesonide  250 mcg Nebulization BID    Arformoterol Tartrate  15 mcg Nebulization BID    sodium chloride (Inhalant)  15 mL Nebulization TID     PRN Meds: sodium chloride flush, sodium chloride, morphine, phenol, albuterol sulfate HFA, albuterol, promethazine **OR** ondansetron      Intake/Output Summary (Last 24 hours) at 4/27/2021 1217  Last data filed at 4/27/2021 0934  Gross per 24 hour   Intake 1810 ml   Output 1725 ml   Net 85 ml       Physical Exam Performed:    BP (!) 126/53   Pulse 79   Temp 98.4 °F (36.9 °C) (Oral)   Resp 18   Ht 5' 7\" (1.702 m)   Wt 113 lb 12.1 oz (51.6 kg)   SpO2 96%   BMI 17.82 kg/m²     General appearance: No apparent distress, lying in bed comfortably  Neck: Supple  Respiratory:  Normal respiratory effort. Clear to auscultation, bilaterally without Rales/Wheezes/Rhonchi. Cardiovascular: Regular rate and rhythm with normal S1/S2 without murmurs, rubs or gallops. Abdomen: Soft, tenderness noted, less distended   Musculoskeletal: No clubbing, cyanosis  Skin: Skin color, texture, turgor normal.  No rashes or lesions.   Neurologic:  No focal weakness Psychiatric: Alert and oriented  Capillary Refill: Brisk,3 seconds, normal   Peripheral Pulses: +2 palpable, equal bilaterally       Labs:   Recent Labs     04/25/21 0455 04/26/21  0400 04/27/21  0415   WBC 8.5 5.5 4.7   HGB 10.6* 10.3* 9.8*   HCT 32.6* 31.8* 29.5*    153 142     Recent Labs     04/25/21  0455 04/25/21  1129 04/26/21  0400 04/27/21  0415     --  142 137   K 4.6  --  4.0 4.2     --  101 100   CO2 34*  --  34* 34*   BUN 30*  --  16 12   CREATININE 0.7*  --  0.6* 0.6*   CALCIUM 8.6  --  8.7 8.3   PHOS 2.4* 2.1* 2.2* 2.3*     Recent Labs     04/25/21 0455 04/26/21 0400 04/27/21 0415   AST 21 15 21   * 101* 79*   BILIDIR <0.2 <0.2 <0.2   BILITOT 0.6 0.7 0.5   ALKPHOS 141* 132* 107     No results for input(s): INR in the last 72 hours. No results for input(s): Charley Loth in the last 72 hours. Urinalysis:      Lab Results   Component Value Date    NITRU Negative 04/21/2021    WBCUA 7 04/21/2021    BACTERIA 1+ 06/17/2014    RBCUA 2 04/21/2021    RBCUA 2 11/19/2011    BLOODU TRACE 04/21/2021    SPECGRAV >1.030 04/21/2021    GLUCOSEU Negative 04/21/2021       Radiology:  XR ABDOMEN (2 VIEWS)   Final Result   Resolving ileus         US ABDOMEN LIMITED   Final Result   Normal sonographic appearance the liver. Marked extrahepatic biliary   dilatation without significant intrahepatic biliary dilatation. Dilatation   bleed related to prior cholecystectomy and patient's age. No acute   abnormality. XR ABDOMEN (KUB) (SINGLE AP VIEW)   Final Result   Nasogastric tube projects to the proximal stomach, normal position. Persistent dilated loops of small bowel in the upper abdomen. XR CHEST PORTABLE   Final Result   No acute cardiac or pulmonary disease. NG tube extends into the stomach. CTA ABDOMEN PELVIS W CONTRAST   Final Result   1.  Persisting diffuse small bowel dilatation with fluid-filled lumen   consistent with small bowel obstruction diameter, as discussed above. 3. Severe atherosclerotic disease involving the bilateral common, internal   and external iliac arterial vasculature and branches with associated   multifocal high-grade stenosis. 4. Celiac trunk origin focal high-grade stenosis secondary to marked   atherosclerotic disease. 5. Severe sigmoid colon diverticulosis without evidence of diverticulitis. 6. Bilateral lower lung lobe scattered \"tree-in-bud\" opacification pattern   consistent with bronchiolitis/small airway disease with posterior lower lung   lobe multifocal mild consolidation consistent with pneumonia. 7. Cholecystectomy with marked reservoir effect, as discussed above. 8. Splenic chronic granulomatous disease. 9. Mild diffuse distention of esophagus with fluid-filled lumen suggesting   association with gastroesophageal reflux. Recommend clinical correlation. 10. Symmetric bilateral renal cortical volume loss suggesting association   with senescent change versus chronic medical renal disease. 11. Redemonstration of suspected left adrenal gland adenoma. RECOMMENDATIONS:   4.4 cm infrarenal abdominal aortic aneurysm. Recommend follow-up every 12   months and vascular consultation. Reference: J Am Sophie Radiol 3473;[id number]:[id number]. 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  Sepsis, likely due to MRSA pneumonia, IV antibiotics started patient on cefepime Flagyl and vancomycin, changed to Vanc and levaquin, pulmo consulted. trasnfered out of ICU. 2. SBO, iv fluids, GS consulted. Had BM yesterday. Advanced diet to full liquid today. 3. Pneumonia, POA, cefepime vancomycin and Flagyl changed to Levaquin.   4. Acute on chronic respiratory failure with hypercarbia, POA, due to COPD exacerbation and potentially mucus plugging, not very compliant with BIPAP, Pulmo consulted.   5. Severe COPD, with acute exacerbation, DuoNeb, on IV steroids, improved.   6. 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 No further interventions planned at this time. 7. Acute metabolic encephalopathy, multifactorial. Improving.   8. Elevated LFTs, ?sepsis related, improving, US noted. 9. Hypernatremia, D 5 started.  improved. 10. Uncontrolled HTN, on iv metoprolol and po amlodipine, will increase amlodipine.        Advance diet as tolerated    Diet: DIET FULL LIQUID;  Dietary Nutrition Supplements: Standard High Calorie Oral Supplement  Code Status: Full Code    Disposition-likely discharge in 1 to 2 days pending clinical improvement, continue IV antibiotics while inpatient    Kalani Hanna MD

## 2021-04-27 NOTE — PROGRESS NOTES
Clinical Pharmacy Note  Vancomycin Consult    Lizbeth Ped Day is a 76 y.o. male ordered Vancomycin for HCAP; consult received from Dr. Brittany Arthur to manage therapy. Also receiving LQ. Patient Active Problem List   Diagnosis    Abdominal pain    Hypertension    COPD (chronic obstructive pulmonary disease) with emphysema (HCC)    Hypercapnic respiratory failure, chronic (HCC)    Partial small bowel obstruction (HCC)    Nausea and vomiting    Pneumonia    Constipation due to pain medication    Small bowel obstruction (HCC)    Sepsis (HCC)    GI bleed    Aortic aneurysm (HCC)    Gram-negative pneumonia (HCC)    Acute metabolic encephalopathy    [address] ([address] obstruction) ([facility] [facility]    Pneumonia due to organism    Severe malnutrition (HCC)    Acute on chronic respiratory failure with hypercapnia (HCC)    COPD with acute exacerbation (HCC)    Mucus plugging of bronchi    Celiac artery stenosis (HCC)    Transaminitis    Hypernatremia       Allergies:  Patient has no known allergies. Temp max:  Temp (24hrs), Av.4 °F (36.9 °C), Min:97.9 °F (36.6 °C), Max:98.9 °F (37.2 °C)      Recent Labs     21  0455 21  0400 21  0415   WBC 8.5 5.5 4.7       Recent Labs     21  0455 21  0400 21  0415   BUN 30* 16 12   CREATININE 0.7* 0.6* 0.6*         Intake/Output Summary (Last 24 hours) at 2021 0736  Last data filed at 2021 0453  Gross per 24 hour   Intake 1660 ml   Output 1375 ml   Net 285 ml       Culture Results:  mrsa    Ht Readings from Last 1 Encounters:   21 5' 7\" (1.702 m)        Wt Readings from Last 1 Encounters:   21 113 lb 12.1 oz (51.6 kg)         Estimated Creatinine Clearance: 78 mL/min (A) (based on SCr of 0.6 mg/dL (L)). Assessment/Plan:  Vanco day 2  Trough 6.9mg/l after 1gm dose yesterday. Vancomycin 1000 mg IV x1 ordered. Regimen projects a trough level of 15-20 mg/L. Level ordered for 21 0600. Thank you for the consult. Electronically signed by Katey Rivas [facility] on 4/27/2021 at 7:36 AM

## 2021-04-28 LAB
ALBUMIN SERPL-MCNC: 3.1 G/DL (ref 3.4–5)
ALP BLD-CCNC: 98 U/L (ref 40–129)
ALT SERPL-CCNC: 59 U/L (ref 10–40)
ANION GAP SERPL CALCULATED.3IONS-SCNC: 4 MMOL/L (ref 3–16)
AST SERPL-CCNC: 24 U/L (ref 15–37)
BASOPHILS ABSOLUTE: 0 K/UL (ref 0–0.2)
BASOPHILS RELATIVE PERCENT: 0.2 %
BILIRUB SERPL-MCNC: 0.3 MG/DL (ref 0–1)
BILIRUBIN DIRECT: <0.2 MG/DL (ref 0–0.3)
BILIRUBIN, INDIRECT: ABNORMAL MG/DL (ref 0–1)
BUN BLDV-MCNC: 8 MG/DL (ref 7–20)
CALCIUM SERPL-MCNC: 7.8 MG/DL (ref 8.3–10.6)
CHLORIDE BLD-SCNC: 99 MMOL/L (ref 99–110)
CO2: 33 MMOL/L (ref 21–32)
CREAT SERPL-MCNC: 0.6 MG/DL (ref 0.8–1.3)
EOSINOPHILS ABSOLUTE: 0 K/UL (ref 0–0.6)
EOSINOPHILS RELATIVE PERCENT: 0.5 %
GFR AFRICAN AMERICAN: >60
GFR NON-AFRICAN AMERICAN: >60
GLUCOSE BLD-MCNC: 243 MG/DL (ref 70–99)
HCT VFR BLD CALC: 27.7 % (ref 40.5–52.5)
HEMOGLOBIN: 9.4 G/DL (ref 13.5–17.5)
LYMPHOCYTES ABSOLUTE: 0.8 K/UL (ref 1–5.1)
LYMPHOCYTES RELATIVE PERCENT: 12.9 %
MAGNESIUM: 2 MG/DL (ref 1.8–2.4)
MCH RBC QN AUTO: 33.8 PG (ref 26–34)
MCHC RBC AUTO-ENTMCNC: 34 G/DL (ref 31–36)
MCV RBC AUTO: 99.4 FL (ref 80–100)
MONOCYTES ABSOLUTE: 0.7 K/UL (ref 0–1.3)
MONOCYTES RELATIVE PERCENT: 11.1 %
NEUTROPHILS ABSOLUTE: 4.8 K/UL (ref 1.7–7.7)
NEUTROPHILS RELATIVE PERCENT: 75.3 %
PDW BLD-RTO: 13.2 % (ref 12.4–15.4)
PHOSPHORUS: 2 MG/DL (ref 2.5–4.9)
PLATELET # BLD: 149 K/UL (ref 135–450)
PMV BLD AUTO: 8.3 FL (ref 5–10.5)
POTASSIUM SERPL-SCNC: 3.5 MMOL/L (ref 3.5–5.1)
RBC # BLD: 2.79 M/UL (ref 4.2–5.9)
SODIUM BLD-SCNC: 136 MMOL/L (ref 136–145)
TOTAL PROTEIN: 4.9 G/DL (ref 6.4–8.2)
VANCOMYCIN RANDOM: 8.3 UG/ML
WBC # BLD: 6.3 K/UL (ref 4–11)

## 2021-04-28 PROCEDURE — 6370000000 HC RX 637 (ALT 250 FOR IP): Performed by: INTERNAL MEDICINE

## 2021-04-28 PROCEDURE — 2580000003 HC RX 258: Performed by: NURSE PRACTITIONER

## 2021-04-28 PROCEDURE — 2500000003 HC RX 250 WO HCPCS: Performed by: INTERNAL MEDICINE

## 2021-04-28 PROCEDURE — 99232 SBSQ HOSP IP/OBS MODERATE 35: CPT | Performed by: SURGERY

## 2021-04-28 PROCEDURE — 84100 ASSAY OF PHOSPHORUS: CPT

## 2021-04-28 PROCEDURE — 80048 BASIC METABOLIC PNL TOTAL CA: CPT

## 2021-04-28 PROCEDURE — 94761 N-INVAS EAR/PLS OXIMETRY MLT: CPT

## 2021-04-28 PROCEDURE — 6360000002 HC RX W HCPCS: Performed by: NURSE PRACTITIONER

## 2021-04-28 PROCEDURE — 6360000002 HC RX W HCPCS: Performed by: INTERNAL MEDICINE

## 2021-04-28 PROCEDURE — 83735 ASSAY OF MAGNESIUM: CPT

## 2021-04-28 PROCEDURE — 2580000003 HC RX 258: Performed by: INTERNAL MEDICINE

## 2021-04-28 PROCEDURE — APPNB30 APP NON BILLABLE TIME 0-30 MINS: Performed by: PHYSICIAN ASSISTANT

## 2021-04-28 PROCEDURE — 2060000000 HC ICU INTERMEDIATE R&B

## 2021-04-28 PROCEDURE — 2700000000 HC OXYGEN THERAPY PER DAY

## 2021-04-28 PROCEDURE — 99024 POSTOP FOLLOW-UP VISIT: CPT | Performed by: PHYSICIAN ASSISTANT

## 2021-04-28 PROCEDURE — 2500000003 HC RX 250 WO HCPCS: Performed by: NURSE PRACTITIONER

## 2021-04-28 PROCEDURE — 85025 COMPLETE CBC W/AUTO DIFF WBC: CPT

## 2021-04-28 PROCEDURE — 94640 AIRWAY INHALATION TREATMENT: CPT

## 2021-04-28 PROCEDURE — 80076 HEPATIC FUNCTION PANEL: CPT

## 2021-04-28 PROCEDURE — 6370000000 HC RX 637 (ALT 250 FOR IP): Performed by: NURSE PRACTITIONER

## 2021-04-28 PROCEDURE — 6370000000 HC RX 637 (ALT 250 FOR IP): Performed by: SURGERY

## 2021-04-28 PROCEDURE — 80202 ASSAY OF VANCOMYCIN: CPT

## 2021-04-28 RX ORDER — LEVOFLOXACIN 5 MG/ML
500 INJECTION, SOLUTION INTRAVENOUS EVERY 24 HOURS
Status: COMPLETED | OUTPATIENT
Start: 2021-04-28 | End: 2021-04-28

## 2021-04-28 RX ADMIN — MORPHINE SULFATE 2 MG: 2 INJECTION, SOLUTION INTRAMUSCULAR; INTRAVENOUS at 18:07

## 2021-04-28 RX ADMIN — METOPROLOL TARTRATE 5 MG: 1 INJECTION, SOLUTION INTRAVENOUS at 04:52

## 2021-04-28 RX ADMIN — FAMOTIDINE 20 MG: 10 INJECTION, SOLUTION INTRAVENOUS at 20:20

## 2021-04-28 RX ADMIN — METOPROLOL TARTRATE 5 MG: 1 INJECTION, SOLUTION INTRAVENOUS at 20:22

## 2021-04-28 RX ADMIN — SODIUM CHLORIDE, PRESERVATIVE FREE 10 ML: 5 INJECTION INTRAVENOUS at 20:21

## 2021-04-28 RX ADMIN — SODIUM CHLORIDE SOLN NEBU 3% 4 ML: 3 NEBU SOLN at 08:05

## 2021-04-28 RX ADMIN — IPRATROPIUM BROMIDE AND ALBUTEROL SULFATE 1 AMPULE: .5; 3 SOLUTION RESPIRATORY (INHALATION) at 12:05

## 2021-04-28 RX ADMIN — BUDESONIDE 250 MCG: 0.25 SUSPENSION RESPIRATORY (INHALATION) at 20:02

## 2021-04-28 RX ADMIN — IPRATROPIUM BROMIDE AND ALBUTEROL SULFATE 1 AMPULE: .5; 3 SOLUTION RESPIRATORY (INHALATION) at 20:03

## 2021-04-28 RX ADMIN — ARFORMOTEROL TARTRATE 15 MCG: 15 SOLUTION RESPIRATORY (INHALATION) at 20:01

## 2021-04-28 RX ADMIN — IPRATROPIUM BROMIDE AND ALBUTEROL SULFATE 1 AMPULE: .5; 3 SOLUTION RESPIRATORY (INHALATION) at 17:00

## 2021-04-28 RX ADMIN — PREDNISONE 40 MG: 20 TABLET ORAL at 08:23

## 2021-04-28 RX ADMIN — METOPROLOL TARTRATE 5 MG: 1 INJECTION, SOLUTION INTRAVENOUS at 16:59

## 2021-04-28 RX ADMIN — AMLODIPINE BESYLATE 10 MG: 10 TABLET ORAL at 08:23

## 2021-04-28 RX ADMIN — SODIUM CHLORIDE SOLN NEBU 3% 4 ML: 3 NEBU SOLN at 12:05

## 2021-04-28 RX ADMIN — IPRATROPIUM BROMIDE AND ALBUTEROL SULFATE 1 AMPULE: .5; 3 SOLUTION RESPIRATORY (INHALATION) at 00:29

## 2021-04-28 RX ADMIN — DOCUSATE SODIUM 100 MG: 100 CAPSULE, LIQUID FILLED ORAL at 08:23

## 2021-04-28 RX ADMIN — DEXTROSE AND SODIUM CHLORIDE: 5; 450 INJECTION, SOLUTION INTRAVENOUS at 06:55

## 2021-04-28 RX ADMIN — DOCUSATE SODIUM 100 MG: 100 CAPSULE, LIQUID FILLED ORAL at 20:20

## 2021-04-28 RX ADMIN — FAMOTIDINE 20 MG: 10 INJECTION, SOLUTION INTRAVENOUS at 08:23

## 2021-04-28 RX ADMIN — IPRATROPIUM BROMIDE AND ALBUTEROL SULFATE 1 AMPULE: .5; 3 SOLUTION RESPIRATORY (INHALATION) at 08:05

## 2021-04-28 RX ADMIN — SODIUM CHLORIDE, PRESERVATIVE FREE 10 ML: 5 INJECTION INTRAVENOUS at 08:19

## 2021-04-28 RX ADMIN — LEVOFLOXACIN 500 MG: 5 INJECTION, SOLUTION INTRAVENOUS at 10:40

## 2021-04-28 RX ADMIN — MORPHINE SULFATE 2 MG: 2 INJECTION, SOLUTION INTRAMUSCULAR; INTRAVENOUS at 08:30

## 2021-04-28 RX ADMIN — IPRATROPIUM BROMIDE AND ALBUTEROL SULFATE 1 AMPULE: .5; 3 SOLUTION RESPIRATORY (INHALATION) at 04:33

## 2021-04-28 RX ADMIN — ENOXAPARIN SODIUM 50 MG: 60 INJECTION SUBCUTANEOUS at 20:20

## 2021-04-28 RX ADMIN — SODIUM CHLORIDE SOLN NEBU 3% 15 ML: 3 NEBU SOLN at 20:03

## 2021-04-28 RX ADMIN — BUDESONIDE 250 MCG: 0.25 SUSPENSION RESPIRATORY (INHALATION) at 08:06

## 2021-04-28 RX ADMIN — ARFORMOTEROL TARTRATE 15 MCG: 15 SOLUTION RESPIRATORY (INHALATION) at 08:05

## 2021-04-28 RX ADMIN — ENOXAPARIN SODIUM 50 MG: 60 INJECTION SUBCUTANEOUS at 08:24

## 2021-04-28 RX ADMIN — METOPROLOL TARTRATE 5 MG: 1 INJECTION, SOLUTION INTRAVENOUS at 10:30

## 2021-04-28 RX ADMIN — Medication 1250 MG: at 08:17

## 2021-04-28 ASSESSMENT — PAIN SCALES - GENERAL
PAINLEVEL_OUTOF10: 0
PAINLEVEL_OUTOF10: 0
PAINLEVEL_OUTOF10: 6
PAINLEVEL_OUTOF10: 0

## 2021-04-28 ASSESSMENT — PAIN DESCRIPTION - ORIENTATION
ORIENTATION: MID

## 2021-04-28 ASSESSMENT — PAIN DESCRIPTION - ONSET
ONSET: ON-GOING
ONSET: ON-GOING

## 2021-04-28 ASSESSMENT — PAIN - FUNCTIONAL ASSESSMENT
PAIN_FUNCTIONAL_ASSESSMENT: ACTIVITIES ARE NOT PREVENTED
PAIN_FUNCTIONAL_ASSESSMENT: ACTIVITIES ARE NOT PREVENTED

## 2021-04-28 ASSESSMENT — PAIN DESCRIPTION - DESCRIPTORS: DESCRIPTORS: DISCOMFORT

## 2021-04-28 ASSESSMENT — PAIN DESCRIPTION - FREQUENCY: FREQUENCY: INTERMITTENT

## 2021-04-28 ASSESSMENT — PAIN DESCRIPTION - LOCATION: LOCATION: ABDOMEN

## 2021-04-28 ASSESSMENT — PAIN DESCRIPTION - PAIN TYPE
TYPE: ACUTE PAIN
TYPE: ACUTE PAIN

## 2021-04-28 ASSESSMENT — PAIN DESCRIPTION - PROGRESSION: CLINICAL_PROGRESSION: GRADUALLY IMPROVING

## 2021-04-28 NOTE — PLAN OF CARE
Problem: Pain:  Goal: Pain level will decrease  Description: Pain level will decrease  Outcome: Ongoing  Goal: Control of acute pain  Description: Control of acute pain  Outcome: Ongoing  Goal: Control of chronic pain  Description: Control of chronic pain  Outcome: Ongoing  Goal: Patient's pain/discomfort is manageable  Description: Patient's pain/discomfort is manageable  Outcome: Ongoing     Problem: Nutrition  Goal: Optimal nutrition therapy  Outcome: Ongoing     Problem: Infection:  Goal: Will remain free from infection  Description: Will remain free from infection  Outcome: Ongoing     Problem: Safety:  Goal: Free from accidental physical injury  Description: Free from accidental physical injury  Outcome: Ongoing  Goal: Free from intentional harm  Description: Free from intentional harm  Outcome: Ongoing     Problem: Daily Care:  Goal: Daily care needs are met  Description: Daily care needs are met  Outcome: Ongoing     Problem: Skin Integrity:  Goal: Will show no infection signs and symptoms  Description: Will show no infection signs and symptoms  Outcome: Ongoing  Goal: Absence of new skin breakdown  Description: Absence of new skin breakdown  Outcome: Ongoing     Problem: Discharge Planning:  Goal: Patients continuum of care needs are met  Description: Patients continuum of care needs are met  Outcome: Ongoing     Problem: Falls - Risk of:  Goal: Will remain free from falls  Description: Will remain free from falls  Outcome: Ongoing  Goal: Absence of physical injury  Description: Absence of physical injury  Outcome: Ongoing   Electronically signed by Ana Paredes RN on 4/28/2021 at 6:49 PM

## 2021-04-28 NOTE — PROGRESS NOTES
[facility] [phone]                                     Daily Progress Note                                                         Pt Name: Dayday De Leon  Medical Record Number: [id number]  Date of Birth 1945   Today's Date: 2021  CC: SBO vs ileus    ASSESSMENT/PLAN  77 yo male with severe COPD, bilateral LL pneumonia, SBO vs ileus     -SBO vs ileus  - Pt pulled out NG .  -Denies any nausea or emesis, but had some bloating and pain yesterday. Continue full liquids for now.    + flatus, and BM yesterday morning.     -Continue IV hydration. Poor surgical candidate.      -Severe COPD, bilateral LL pneumonia, hypercarbia - per pulmonology. On vancomycin, levaquin. Steroids per pulmonology     -Hx of HTN, weight loss, deconditioning - per primary team.     -Severe malnutrition - patient states he does not like boost/ensure. Will give chocolate milk with meals       SUBJECTIVE  Dayday Taylor is improved today. Denies nausea. Tolerating some clears. Passing flatus, no BM. OBJECTIVE  VITALS:  height is 5' 7\" (1.702 m) and weight is 112 lb 3.4 oz (50.9 kg). His axillary temperature is 98.7 °F (37.1 °C). His blood pressure is 162/74 (abnormal) and his pulse is 72. His respiration is 14 and oxygen saturation is 99%. INTAKE/OUTPUT:      Intake/Output Summary (Last 24 hours) at 2021 0845  Last data filed at 2021 0817  Gross per 24 hour   Intake 500 ml   Output 2050 ml   Net -1550 ml     GENERAL: alert, cooperative, no distress  LUNGS: normal respiratory effort, no accessory muscle use  HEART: normal rate and regular rhythm  ABDOMEN:  Soft, less distended, NT. Scars consistent with surgical history. EXTREMITY: no cyanosis, clubbing or edema    I/O last 3 completed shifts:   In: 56 [P.O.:240; IV Piggyback:250]  Out:  [Urine:]      LABS  Recent Labs     21  0500   WBC 6.3   HGB 9.4*   HCT 27.7*       K 3.5   CL 99   CO2 33*   BUN 8   CREATININE 0.6*   MG 2.00   PHOS 2.0*   CALCIUM 7.8*   AST 24   ALT 59*   BILITOT 0.3   BILIDIR <0.2       EDUCATION  Patient educated about Disease Process, Medications, Smoking Cessation, Oxygenation, Incentive Spirometry and Deep Breath and Cough, Diabetes, Hyperlipidemia, Smoking Cessation, Nutrition, Exercise and Hypertension    Electronically signed by Tim Santiago MD on 4/28/2021 at 8:45 AM

## 2021-04-28 NOTE — CARE COORDINATION
CM follow up. Met with patient at bedside. Patient plans on returning to [address] [place] [address] [address] MD's note says poss DC in 1-2 days. LAUREN long/ Geremias Ranks from [facility] to confirm return. Electronically signed by Marjorie Pichardo RN Case Management [phone] on 2021 at 2:16 PM     [facility] able to accept back at DC. No pre cert needed. Repeat covid not needed.  Electronically signed by Marjorie Pichardo RN Case Management [phone] on 2021 at 3:30 PM

## 2021-04-28 NOTE — PROGRESS NOTES
Clinical Pharmacy Note  Vancomycin Consult    Juma De Leon is a 76 y.o. male ordered Vancomycin for HCAP; consult received from Dr. Lisa Maldonado to manage therapy. Also receiving LQ. Patient Active Problem List   Diagnosis    Abdominal pain    Hypertension    COPD (chronic obstructive pulmonary disease) with emphysema (HCC)    Hypercapnic respiratory failure, chronic (HCC)    Partial small bowel obstruction (HCC)    Nausea and vomiting    Pneumonia    Constipation due to pain medication    Small bowel obstruction (HCC)    Sepsis (HCC)    GI bleed    Aortic aneurysm (HCC)    Gram-negative pneumonia (HCC)    Acute metabolic encephalopathy    SBO (small bowel obstruction) ([address]    Pneumonia due to organism    Severe malnutrition (HCC)    Acute on chronic respiratory failure with hypercapnia (HCC)    COPD with acute exacerbation (HCC)    Mucus plugging of bronchi    Celiac artery stenosis (HCC)    Transaminitis    Hypernatremia       Allergies:  Patient has no known allergies. Temp max:  Temp (24hrs), Av.1 °F (36.7 °C), Min:97.6 °F (36.4 °C), Max:98.6 °F (37 °C)      Recent Labs     21  0400 21  0415 21  0500   WBC 5.5 4.7 6.3       Recent Labs     21  0400 21  0415 21  0500   BUN 16 12 8   CREATININE 0.6* 0.6* 0.6*         Intake/Output Summary (Last 24 hours) at 2021 2321  Last data filed at 2021 0701  Gross per 24 hour   Intake 490 ml   Output 2425 ml   Net -1935 ml       Culture Results:  mrsa    Ht Readings from Last 1 Encounters:   21 5' 7\" (1.702 m)        Wt Readings from Last 1 Encounters:   21 112 lb 3.4 oz (50.9 kg)         Estimated Creatinine Clearance: 77 mL/min (A) (based on SCr of 0.6 mg/dL (L)). Assessment/Plan:  Vanco day 3  Trough 8.3mg/l after 2 days of 1gm vanco  Vancomycin 1250 mg IV x1 ordered. Regimen projects a trough level of 15-20 mg/L. Level ordered for 21 0600. Thank you for the consult. Electronically signed by STANFORD Marti [facility] on 4/28/2021 at 7:18 AM

## 2021-04-28 NOTE — PROGRESS NOTES
Hospitalist Progress Note      PCP: Emily Daniels MD    Date of Admission: 4/21/2021      Subjective: Patient seen and examined. Feels better. Passing gas. Had some abdominal discomfort with full liquid diet yesterday, but feels ok today. Medications:  Reviewed    Infusion Medications    sodium chloride      dextrose 5 % and 0.45 % NaCl 50 mL/hr at 04/28/21 0655     Scheduled Medications    levofloxacin  500 mg Intravenous Q24H    docusate sodium  100 mg Oral BID    amLODIPine  10 mg Oral Daily    predniSONE  40 mg Oral Daily    sodium chloride flush  5-40 mL Intravenous 2 times per day    metoprolol  5 mg Intravenous Q6H    enoxaparin  1 mg/kg Subcutaneous BID    ipratropium-albuterol  1 ampule Inhalation Q4H    famotidine (PEPCID) injection  20 mg Intravenous BID    budesonide  250 mcg Nebulization BID    Arformoterol Tartrate  15 mcg Nebulization BID    sodium chloride (Inhalant)  15 mL Nebulization TID     PRN Meds: sodium chloride flush, sodium chloride, morphine, phenol, albuterol sulfate HFA, albuterol, promethazine **OR** ondansetron      Intake/Output Summary (Last 24 hours) at 4/28/2021 1031  Last data filed at 4/28/2021 1016  Gross per 24 hour   Intake 500 ml   Output 2050 ml   Net -1550 ml       Physical Exam Performed:    BP (!) 162/74   Pulse 72   Temp 98.7 °F (37.1 °C) (Axillary)   Resp 14   Ht 5' 7\" (1.702 m)   Wt 112 lb 3.4 oz (50.9 kg)   SpO2 99%   BMI 17.58 kg/m²     General appearance: No apparent distress, lying in bed comfortably  Neck: Supple  Respiratory:  Normal respiratory effort. Clear to auscultation, bilaterally without Rales/Wheezes/Rhonchi. Cardiovascular: Regular rate and rhythm with normal S1/S2 without murmurs, rubs or gallops. Abdomen: Soft, tenderness noted, less distended   Musculoskeletal: No clubbing, cyanosis  Skin: Skin color, texture, turgor normal.  No rashes or lesions.   Neurologic:  No focal weakness   Psychiatric: Alert and oriented lower quadrant   possibly related to internal hernia. 2. Redemonstration of infrarenal fusiform abdominal aortic aneurysm with   lumen measuring up to 4.4 cm in greatest diameter, as discussed above. 3. Severe atherosclerotic disease involving the bilateral common, internal   and external iliac arterial vasculature and branches with associated   multifocal high-grade stenosis. 4. Celiac trunk origin focal high-grade stenosis secondary to marked   atherosclerotic disease. 5. Severe sigmoid colon diverticulosis without evidence of diverticulitis. 6. Bilateral lower lung lobe scattered \"tree-in-bud\" opacification pattern   consistent with bronchiolitis/small airway disease with posterior lower lung   lobe multifocal mild consolidation consistent with pneumonia. 7. Cholecystectomy with marked reservoir effect, as discussed above. 8. Splenic chronic granulomatous disease. 9. Mild diffuse distention of esophagus with fluid-filled lumen suggesting   association with gastroesophageal reflux. Recommend clinical correlation. 10. Symmetric bilateral renal cortical volume loss suggesting association   with senescent change versus chronic medical renal disease. 11. Redemonstration of suspected left adrenal gland adenoma. RECOMMENDATIONS:   4.4 cm infrarenal abdominal aortic aneurysm. Recommend follow-up every 12   months and vascular consultation. Reference: J Am Sophie Radiol 2150;93:497-444. CT CHEST WO CONTRAST   Final Result   1. Persisting diffuse small bowel dilatation with fluid-filled lumen   consistent with small bowel obstruction proximal to suggestion of focal   mechanical transition point at the central abdomen/right lower quadrant   possibly related to internal hernia. 2. Redemonstration of infrarenal fusiform abdominal aortic aneurysm with   lumen measuring up to 4.4 cm in greatest diameter, as discussed above.    3. Severe atherosclerotic disease involving the bilateral common, internal   and external iliac arterial vasculature and branches with associated   multifocal high-grade stenosis. 4. Celiac trunk origin focal high-grade stenosis secondary to marked   atherosclerotic disease. 5. Severe sigmoid colon diverticulosis without evidence of diverticulitis. 6. Bilateral lower lung lobe scattered \"tree-in-bud\" opacification pattern   consistent with bronchiolitis/small airway disease with posterior lower lung   lobe multifocal mild consolidation consistent with pneumonia. 7. Cholecystectomy with marked reservoir effect, as discussed above. 8. Splenic chronic granulomatous disease. 9. Mild diffuse distention of esophagus with fluid-filled lumen suggesting   association with gastroesophageal reflux. Recommend clinical correlation. 10. Symmetric bilateral renal cortical volume loss suggesting association   with senescent change versus chronic medical renal disease. 11. Redemonstration of suspected left adrenal gland adenoma. RECOMMENDATIONS:   4.4 cm infrarenal abdominal aortic aneurysm. Recommend follow-up every 12   months and vascular consultation. Reference: J Am Sophie Radiol 8895;98:[id number]. 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  Sepsis, likely due to MRSA pneumonia, IV antibiotics started patient on cefepime Flagyl and vancomycin, changed to Vanc and levaquin, pulmo consulted. trasnfered out of ICU. Will complete Abx course today. 2. SBO, iv fluids, GS consulted. Had BM yesterday. Continue full liquid diet. Diet advancement per GS. 3. MRSA Pneumonia, POA, cefepime vancomycin and Flagyl changed to Levaquin. Completing course today. 4. Acute on chronic respiratory failure with hypercarbia, POA, due to COPD exacerbation and potentially mucus plugging, not very compliant with BIPAP, Pulmo consulted.   5. Severe COPD, with acute exacerbation, DuoNeb, on IV steroids, improved. Will continue steroid taper. 6. 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 No further interventions planned at this time. 7. Acute metabolic encephalopathy, multifactorial. Improving.   8. Elevated LFTs, ?sepsis related, improving, US noted. 9. Hypernatremia, D 5 started.  improved. 10. Uncontrolled HTN, on iv metoprolol and po amlodipine, will increase amlodipine.        Advance diet as tolerated    Diet: DIET FULL LIQUID;  Dietary Nutrition Supplements: Standard High Calorie Oral Supplement  Code Status: Full Code    Disposition-likely discharge in 1 to 2 days pending clinical improvement, continue IV antibiotics while inpatient    Kg Arias MD

## 2021-04-29 ENCOUNTER — APPOINTMENT (OUTPATIENT)
Dept: CT IMAGING | Age: 76
DRG: 871 | End: 2021-04-29
Payer: MEDICARE

## 2021-04-29 LAB
ALBUMIN SERPL-MCNC: 3.2 G/DL (ref 3.4–5)
ALP BLD-CCNC: 96 U/L (ref 40–129)
ALT SERPL-CCNC: 70 U/L (ref 10–40)
ANION GAP SERPL CALCULATED.3IONS-SCNC: 4 MMOL/L (ref 3–16)
AST SERPL-CCNC: 33 U/L (ref 15–37)
BASOPHILS ABSOLUTE: 0.1 K/UL (ref 0–0.2)
BASOPHILS RELATIVE PERCENT: 0.8 %
BILIRUB SERPL-MCNC: 0.3 MG/DL (ref 0–1)
BILIRUBIN DIRECT: <0.2 MG/DL (ref 0–0.3)
BILIRUBIN, INDIRECT: ABNORMAL MG/DL (ref 0–1)
BUN BLDV-MCNC: 8 MG/DL (ref 7–20)
CALCIUM SERPL-MCNC: 8.1 MG/DL (ref 8.3–10.6)
CHLORIDE BLD-SCNC: 101 MMOL/L (ref 99–110)
CO2: 35 MMOL/L (ref 21–32)
CREAT SERPL-MCNC: 0.6 MG/DL (ref 0.8–1.3)
EOSINOPHILS ABSOLUTE: 0 K/UL (ref 0–0.6)
EOSINOPHILS RELATIVE PERCENT: 0.7 %
GFR AFRICAN AMERICAN: >60
GFR NON-AFRICAN AMERICAN: >60
GLUCOSE BLD-MCNC: 109 MG/DL (ref 70–99)
HCT VFR BLD CALC: 29.2 % (ref 40.5–52.5)
HEMOGLOBIN: 9.5 G/DL (ref 13.5–17.5)
LYMPHOCYTES ABSOLUTE: 0.7 K/UL (ref 1–5.1)
LYMPHOCYTES RELATIVE PERCENT: 9.5 %
MAGNESIUM: 1.9 MG/DL (ref 1.8–2.4)
MCH RBC QN AUTO: 32.3 PG (ref 26–34)
MCHC RBC AUTO-ENTMCNC: 32.5 G/DL (ref 31–36)
MCV RBC AUTO: 99.4 FL (ref 80–100)
MONOCYTES ABSOLUTE: 0.8 K/UL (ref 0–1.3)
MONOCYTES RELATIVE PERCENT: 11.3 %
NEUTROPHILS ABSOLUTE: 5.5 K/UL (ref 1.7–7.7)
NEUTROPHILS RELATIVE PERCENT: 77.7 %
PDW BLD-RTO: 13.7 % (ref 12.4–15.4)
PHOSPHORUS: 2.1 MG/DL (ref 2.5–4.9)
PLATELET # BLD: 163 K/UL (ref 135–450)
PMV BLD AUTO: 8.1 FL (ref 5–10.5)
POTASSIUM SERPL-SCNC: 3.7 MMOL/L (ref 3.5–5.1)
RBC # BLD: 2.94 M/UL (ref 4.2–5.9)
SODIUM BLD-SCNC: 140 MMOL/L (ref 136–145)
TOTAL PROTEIN: 5.1 G/DL (ref 6.4–8.2)
WBC # BLD: 7 K/UL (ref 4–11)

## 2021-04-29 PROCEDURE — 6360000004 HC RX CONTRAST MEDICATION

## 2021-04-29 PROCEDURE — 2580000003 HC RX 258: Performed by: INTERNAL MEDICINE

## 2021-04-29 PROCEDURE — 6360000002 HC RX W HCPCS: Performed by: INTERNAL MEDICINE

## 2021-04-29 PROCEDURE — 6370000000 HC RX 637 (ALT 250 FOR IP): Performed by: NURSE PRACTITIONER

## 2021-04-29 PROCEDURE — 2500000003 HC RX 250 WO HCPCS: Performed by: INTERNAL MEDICINE

## 2021-04-29 PROCEDURE — 80048 BASIC METABOLIC PNL TOTAL CA: CPT

## 2021-04-29 PROCEDURE — 74177 CT ABD & PELVIS W/CONTRAST: CPT

## 2021-04-29 PROCEDURE — 99232 SBSQ HOSP IP/OBS MODERATE 35: CPT | Performed by: SURGERY

## 2021-04-29 PROCEDURE — 6370000000 HC RX 637 (ALT 250 FOR IP): Performed by: INTERNAL MEDICINE

## 2021-04-29 PROCEDURE — 83735 ASSAY OF MAGNESIUM: CPT

## 2021-04-29 PROCEDURE — 2060000000 HC ICU INTERMEDIATE R&B

## 2021-04-29 PROCEDURE — APPNB15 APP NON BILLABLE TIME 0-15 MINS: Performed by: NURSE PRACTITIONER

## 2021-04-29 PROCEDURE — 2500000003 HC RX 250 WO HCPCS: Performed by: NURSE PRACTITIONER

## 2021-04-29 PROCEDURE — 80076 HEPATIC FUNCTION PANEL: CPT

## 2021-04-29 PROCEDURE — 36592 COLLECT BLOOD FROM PICC: CPT

## 2021-04-29 PROCEDURE — 84100 ASSAY OF PHOSPHORUS: CPT

## 2021-04-29 PROCEDURE — 2580000003 HC RX 258: Performed by: NURSE PRACTITIONER

## 2021-04-29 PROCEDURE — 94640 AIRWAY INHALATION TREATMENT: CPT

## 2021-04-29 PROCEDURE — 6360000004 HC RX CONTRAST MEDICATION: Performed by: SURGERY

## 2021-04-29 PROCEDURE — APPSS15 APP SPLIT SHARED TIME 0-15 MINUTES: Performed by: NURSE PRACTITIONER

## 2021-04-29 PROCEDURE — 2700000000 HC OXYGEN THERAPY PER DAY

## 2021-04-29 PROCEDURE — 85025 COMPLETE CBC W/AUTO DIFF WBC: CPT

## 2021-04-29 PROCEDURE — 6370000000 HC RX 637 (ALT 250 FOR IP): Performed by: SURGERY

## 2021-04-29 PROCEDURE — 6360000002 HC RX W HCPCS: Performed by: NURSE PRACTITIONER

## 2021-04-29 PROCEDURE — 94761 N-INVAS EAR/PLS OXIMETRY MLT: CPT

## 2021-04-29 RX ORDER — POLYETHYLENE GLYCOL 3350 17 G/17G
17 POWDER, FOR SOLUTION ORAL 2 TIMES DAILY
Status: DISCONTINUED | OUTPATIENT
Start: 2021-04-29 | End: 2021-05-04 | Stop reason: HOSPADM

## 2021-04-29 RX ORDER — LORAZEPAM 2 MG/ML
0.5 INJECTION INTRAMUSCULAR
Status: COMPLETED | OUTPATIENT
Start: 2021-04-29 | End: 2021-04-29

## 2021-04-29 RX ADMIN — IPRATROPIUM BROMIDE AND ALBUTEROL SULFATE 1 AMPULE: .5; 3 SOLUTION RESPIRATORY (INHALATION) at 20:45

## 2021-04-29 RX ADMIN — PREDNISONE 40 MG: 20 TABLET ORAL at 07:59

## 2021-04-29 RX ADMIN — IOPAMIDOL 75 ML: 755 INJECTION, SOLUTION INTRAVENOUS at 11:03

## 2021-04-29 RX ADMIN — SODIUM CHLORIDE SOLN NEBU 3% 15 ML: 3 NEBU SOLN at 08:50

## 2021-04-29 RX ADMIN — IPRATROPIUM BROMIDE AND ALBUTEROL SULFATE 1 AMPULE: .5; 3 SOLUTION RESPIRATORY (INHALATION) at 16:13

## 2021-04-29 RX ADMIN — METOPROLOL TARTRATE 25 MG: 25 TABLET, FILM COATED ORAL at 11:31

## 2021-04-29 RX ADMIN — FAMOTIDINE 20 MG: 10 INJECTION, SOLUTION INTRAVENOUS at 07:59

## 2021-04-29 RX ADMIN — POLYETHYLENE GLYCOL 3350 17 G: 17 POWDER, FOR SOLUTION ORAL at 21:44

## 2021-04-29 RX ADMIN — BUDESONIDE 250 MCG: 0.25 SUSPENSION RESPIRATORY (INHALATION) at 20:48

## 2021-04-29 RX ADMIN — IPRATROPIUM BROMIDE AND ALBUTEROL SULFATE 1 AMPULE: .5; 3 SOLUTION RESPIRATORY (INHALATION) at 00:28

## 2021-04-29 RX ADMIN — IOHEXOL 50 ML: 240 INJECTION, SOLUTION INTRATHECAL; INTRAVASCULAR; INTRAVENOUS; ORAL at 09:12

## 2021-04-29 RX ADMIN — IPRATROPIUM BROMIDE AND ALBUTEROL SULFATE 1 AMPULE: .5; 3 SOLUTION RESPIRATORY (INHALATION) at 08:50

## 2021-04-29 RX ADMIN — ARFORMOTEROL TARTRATE 15 MCG: 15 SOLUTION RESPIRATORY (INHALATION) at 20:45

## 2021-04-29 RX ADMIN — DOCUSATE SODIUM 100 MG: 100 CAPSULE, LIQUID FILLED ORAL at 21:44

## 2021-04-29 RX ADMIN — SODIUM CHLORIDE, PRESERVATIVE FREE 10 ML: 5 INJECTION INTRAVENOUS at 21:45

## 2021-04-29 RX ADMIN — IPRATROPIUM BROMIDE AND ALBUTEROL SULFATE 1 AMPULE: .5; 3 SOLUTION RESPIRATORY (INHALATION) at 04:37

## 2021-04-29 RX ADMIN — DOCUSATE SODIUM 100 MG: 100 CAPSULE, LIQUID FILLED ORAL at 07:59

## 2021-04-29 RX ADMIN — SODIUM CHLORIDE, PRESERVATIVE FREE 10 ML: 5 INJECTION INTRAVENOUS at 08:00

## 2021-04-29 RX ADMIN — DEXTROSE AND SODIUM CHLORIDE: 5; 450 INJECTION, SOLUTION INTRAVENOUS at 00:43

## 2021-04-29 RX ADMIN — LORAZEPAM 0.5 MG: 2 INJECTION INTRAMUSCULAR; INTRAVENOUS at 10:55

## 2021-04-29 RX ADMIN — ENOXAPARIN SODIUM 50 MG: 60 INJECTION SUBCUTANEOUS at 07:58

## 2021-04-29 RX ADMIN — FAMOTIDINE 20 MG: 10 INJECTION, SOLUTION INTRAVENOUS at 21:44

## 2021-04-29 RX ADMIN — POTASSIUM PHOSPHATE, MONOBASIC POTASSIUM PHOSPHATE, DIBASIC 15 MMOL: 224; 236 INJECTION, SOLUTION, CONCENTRATE INTRAVENOUS at 11:49

## 2021-04-29 RX ADMIN — SODIUM CHLORIDE SOLN NEBU 3% 15 ML: 3 NEBU SOLN at 20:45

## 2021-04-29 RX ADMIN — AMLODIPINE BESYLATE 10 MG: 10 TABLET ORAL at 07:59

## 2021-04-29 RX ADMIN — IPRATROPIUM BROMIDE AND ALBUTEROL SULFATE 1 AMPULE: .5; 3 SOLUTION RESPIRATORY (INHALATION) at 23:42

## 2021-04-29 RX ADMIN — BUDESONIDE 250 MCG: 0.25 SUSPENSION RESPIRATORY (INHALATION) at 08:50

## 2021-04-29 RX ADMIN — METOPROLOL TARTRATE 5 MG: 1 INJECTION, SOLUTION INTRAVENOUS at 04:58

## 2021-04-29 RX ADMIN — ARFORMOTEROL TARTRATE 15 MCG: 15 SOLUTION RESPIRATORY (INHALATION) at 08:50

## 2021-04-29 RX ADMIN — APIXABAN 5 MG: 5 TABLET, FILM COATED ORAL at 21:44

## 2021-04-29 RX ADMIN — MORPHINE SULFATE 2 MG: 2 INJECTION, SOLUTION INTRAMUSCULAR; INTRAVENOUS at 00:43

## 2021-04-29 RX ADMIN — METOPROLOL TARTRATE 5 MG: 1 INJECTION, SOLUTION INTRAVENOUS at 09:12

## 2021-04-29 RX ADMIN — METOPROLOL TARTRATE 25 MG: 25 TABLET, FILM COATED ORAL at 21:44

## 2021-04-29 RX ADMIN — MORPHINE SULFATE 2 MG: 2 INJECTION, SOLUTION INTRAMUSCULAR; INTRAVENOUS at 07:59

## 2021-04-29 RX ADMIN — POLYETHYLENE GLYCOL 3350 17 G: 17 POWDER, FOR SOLUTION ORAL at 13:25

## 2021-04-29 ASSESSMENT — PAIN - FUNCTIONAL ASSESSMENT: PAIN_FUNCTIONAL_ASSESSMENT: PREVENTS OR INTERFERES SOME ACTIVE ACTIVITIES AND ADLS

## 2021-04-29 ASSESSMENT — PAIN DESCRIPTION - ORIENTATION: ORIENTATION: MID

## 2021-04-29 ASSESSMENT — PAIN DESCRIPTION - DESCRIPTORS: DESCRIPTORS: DISCOMFORT

## 2021-04-29 ASSESSMENT — PAIN DESCRIPTION - LOCATION: LOCATION: ABDOMEN

## 2021-04-29 ASSESSMENT — PAIN SCALES - GENERAL
PAINLEVEL_OUTOF10: 0
PAINLEVEL_OUTOF10: 0

## 2021-04-29 ASSESSMENT — PAIN DESCRIPTION - FREQUENCY
FREQUENCY: INTERMITTENT
FREQUENCY: INTERMITTENT

## 2021-04-29 ASSESSMENT — PAIN DESCRIPTION - PROGRESSION
CLINICAL_PROGRESSION: GRADUALLY WORSENING
CLINICAL_PROGRESSION: GRADUALLY WORSENING

## 2021-04-29 ASSESSMENT — PAIN DESCRIPTION - PAIN TYPE
TYPE: ACUTE PAIN
TYPE: ACUTE PAIN

## 2021-04-29 NOTE — PLAN OF CARE
Problem: Pain:  Goal: Pain level will decrease  Description: Pain level will decrease  4/29/2021 0303 by Caterina Luis RN  Outcome: Ongoing  4/28/2021 1849 by Zander Mcduffie RN  Outcome: Ongoing  Goal: Control of acute pain  Description: Control of acute pain  4/29/2021 0303 by Caterina Luis RN  Outcome: Ongoing  4/28/2021 1849 by Zander Mcduffie RN  Outcome: Ongoing  Goal: Control of chronic pain  Description: Control of chronic pain  4/29/2021 0303 by Caterina Luis RN  Outcome: Ongoing  4/28/2021 1849 by Zander Mcduffie RN  Outcome: Ongoing  Goal: Patient's pain/discomfort is manageable  Description: Patient's pain/discomfort is manageable  4/29/2021 0303 by Caterina Luis RN  Outcome: Ongoing  4/28/2021 1849 by Zander Mcduffie RN  Outcome: Ongoing     Problem: Nutrition  Goal: Optimal nutrition therapy  4/29/2021 0303 by Caterina Luis RN  Outcome: Ongoing  4/28/2021 1849 by Zander Mcduffie RN  Outcome: Ongoing     Problem: Infection:  Goal: Will remain free from infection  Description: Will remain free from infection  4/29/2021 0303 by Caterina Luis RN  Outcome: Ongoing  4/28/2021 1849 by Zander Mcduffie RN  Outcome: Ongoing     Problem: Safety:  Goal: Free from accidental physical injury  Description: Free from accidental physical injury  4/29/2021 0303 by Caterina Luis RN  Outcome: Ongoing  4/28/2021 1849 by Zander Mcduffie RN  Outcome: Ongoing  Goal: Free from intentional harm  Description: Free from intentional harm  4/29/2021 0303 by Caterina Luis RN  Outcome: Ongoing  4/28/2021 1849 by Zander Mcduffie RN  Outcome: Ongoing     Problem: Daily Care:  Goal: Daily care needs are met  Description: Daily care needs are met  4/29/2021 0303 by Caterina Luis RN  Outcome: Ongoing  4/28/2021 1849 by Zander Mcduffie RN  Outcome: Ongoing     Problem: Skin Integrity:  Goal: Skin integrity will stabilize  Description: Skin integrity will stabilize  4/29/2021 0303 by Caterina Cutter, RN  Outcome: Ongoing  4/28/2021 1849 by Adelita Terrazas RN  Outcome: Ongoing     Problem: Discharge Planning:  Goal: Patients continuum of care needs are met  Description: Patients continuum of care needs are met  4/29/2021 0303 by Manjeet Greenfield RN  Outcome: Ongoing  4/28/2021 1849 by Adelita Terrazas RN  Outcome: Ongoing     Problem: Skin Integrity:  Goal: Will show no infection signs and symptoms  Description: Will show no infection signs and symptoms  4/29/2021 0303 by Manjeet Greenfield RN  Outcome: Ongoing  4/28/2021 1849 by Adelita Terrazas RN  Outcome: Ongoing  Goal: Absence of new skin breakdown  Description: Absence of new skin breakdown  4/29/2021 0303 by Manjeet Greenfield RN  Outcome: Ongoing  4/28/2021 1849 by Adelita Terrazas RN  Outcome: Ongoing     Problem: Falls - Risk of:  Goal: Will remain free from falls  Description: Will remain free from falls  4/29/2021 0303 by Manjeet Greenfield RN  Outcome: Ongoing  4/28/2021 1849 by Adelita Terrazas RN  Outcome: Ongoing  Goal: Absence of physical injury  Description: Absence of physical injury  4/29/2021 0303 by Manjeet Greenfield RN  Outcome: Ongoing  4/28/2021 1849 by Adelita Terrazas RN  Outcome: Ongoing

## 2021-04-29 NOTE — PROGRESS NOTES
Hospitalist Progress Note      PCP: Willian Andrade MD    Date of Admission: 4/21/2021      Subjective: Patient seen and examined. Confused but received Ativan just a short time ago. Able to tolerate diet. Medications:  Reviewed    Infusion Medications    sodium chloride      dextrose 5 % and 0.45 % NaCl 50 mL/hr at 04/29/21 0043     Scheduled Medications    [START ON 4/30/2021] predniSONE  30 mg Oral Daily    metoprolol tartrate  25 mg Oral BID    apixaban  5 mg Oral BID    potassium phosphate IVPB  15 mmol Intravenous Once    docusate sodium  100 mg Oral BID    amLODIPine  10 mg Oral Daily    sodium chloride flush  5-40 mL Intravenous 2 times per day    ipratropium-albuterol  1 ampule Inhalation Q4H    famotidine (PEPCID) injection  20 mg Intravenous BID    budesonide  250 mcg Nebulization BID    Arformoterol Tartrate  15 mcg Nebulization BID    sodium chloride (Inhalant)  15 mL Nebulization TID     PRN Meds: sodium chloride flush, sodium chloride, morphine, phenol, albuterol sulfate HFA, albuterol, promethazine **OR** ondansetron      Intake/Output Summary (Last 24 hours) at 4/29/2021 1206  Last data filed at 4/29/2021 1027  Gross per 24 hour   Intake 1070 ml   Output 2200 ml   Net -1130 ml       Physical Exam Performed:    BP 98/62   Pulse 82   Temp 98.7 °F (37.1 °C) (Oral)   Resp 16   Ht 5' 7\" (1.702 m)   Wt 114 lb 13.8 oz (52.1 kg)   SpO2 94%   BMI 17.99 kg/m²     General appearance: No apparent distress, lying in bed comfortably  Neck: Supple  Respiratory:  Normal respiratory effort. Clear to auscultation, bilaterally without Rales/Wheezes/Rhonchi. Cardiovascular: Regular rate and rhythm with normal S1/S2 without murmurs, rubs or gallops. Abdomen: Soft, tenderness noted, less distended   Musculoskeletal: No clubbing, cyanosis  Skin: Skin color, texture, turgor normal.  No rashes or lesions.   Neurologic:  No focal weakness   Psychiatric: Alert and oriented to person and time, dilatation without significant intrahepatic biliary dilatation. Dilatation   bleed related to prior cholecystectomy and patient's age. No acute   abnormality. XR ABDOMEN (KUB) (SINGLE AP VIEW)   Final Result   Nasogastric tube projects to the proximal stomach, normal position. Persistent dilated loops of small bowel in the upper abdomen. XR CHEST PORTABLE   Final Result   No acute cardiac or pulmonary disease. NG tube extends into the stomach. CTA ABDOMEN PELVIS W CONTRAST   Final Result   1. Persisting diffuse small bowel dilatation with fluid-filled lumen   consistent with small bowel obstruction proximal to suggestion of focal   mechanical transition point at the central abdomen/right lower quadrant   possibly related to internal hernia. 2. Redemonstration of infrarenal fusiform abdominal aortic aneurysm with   lumen measuring up to 4.4 cm in greatest diameter, as discussed above. 3. Severe atherosclerotic disease involving the bilateral common, internal   and external iliac arterial vasculature and branches with associated   multifocal high-grade stenosis. 4. Celiac trunk origin focal high-grade stenosis secondary to marked   atherosclerotic disease. 5. Severe sigmoid colon diverticulosis without evidence of diverticulitis. 6. Bilateral lower lung lobe scattered \"tree-in-bud\" opacification pattern   consistent with bronchiolitis/small airway disease with posterior lower lung   lobe multifocal mild consolidation consistent with pneumonia. 7. Cholecystectomy with marked reservoir effect, as discussed above. 8. Splenic chronic granulomatous disease. 9. Mild diffuse distention of esophagus with fluid-filled lumen suggesting   association with gastroesophageal reflux. Recommend clinical correlation. 10. Symmetric bilateral renal cortical volume loss suggesting association   with senescent change versus chronic medical renal disease.    11. Redemonstration of suspected left adrenal gland adenoma. RECOMMENDATIONS:   4.4 cm infrarenal abdominal aortic aneurysm. Recommend follow-up every 12   months and vascular consultation. Reference: J Am Sophie Radiol 0801;79:[id number]. CT CHEST WO CONTRAST   Final Result   1. Persisting diffuse small bowel dilatation with fluid-filled lumen   consistent with small bowel obstruction proximal to suggestion of focal   mechanical transition point at the central abdomen/right lower quadrant   possibly related to internal hernia. 2. Redemonstration of infrarenal fusiform abdominal aortic aneurysm with   lumen measuring up to 4.4 cm in greatest diameter, as discussed above. 3. Severe atherosclerotic disease involving the bilateral common, internal   and external iliac arterial vasculature and branches with associated   multifocal high-grade stenosis. 4. Celiac trunk origin focal high-grade stenosis secondary to marked   atherosclerotic disease. 5. Severe sigmoid colon diverticulosis without evidence of diverticulitis. 6. Bilateral lower lung lobe scattered \"tree-in-bud\" opacification pattern   consistent with bronchiolitis/small airway disease with posterior lower lung   lobe multifocal mild consolidation consistent with pneumonia. 7. Cholecystectomy with marked reservoir effect, as discussed above. 8. Splenic chronic granulomatous disease. 9. Mild diffuse distention of esophagus with fluid-filled lumen suggesting   association with gastroesophageal reflux. Recommend clinical correlation. 10. Symmetric bilateral renal cortical volume loss suggesting association   with senescent change versus chronic medical renal disease. 11. Redemonstration of suspected left adrenal gland adenoma. RECOMMENDATIONS:   4.4 cm infrarenal abdominal aortic aneurysm. Recommend follow-up every 12   months and vascular consultation. Reference: J Am Sophie Radiol 2770;52:[id number].                  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 Sepsis, likely due to MRSA pneumonia, IV antibiotics started patient on cefepime Flagyl and vancomycin, changed to Vanc and levaquin, pulmo consulted. trasnfered out of ICU. Completed Abx course 4/28. 2. SBO, iv fluids, GS consulted. Had BM yesterday. Continue full liquid diet. Diet advancement per GS. Getting CT Ab/Pelvis with contrast today. 3. MRSA Pneumonia, POA, cefepime vancomycin and Flagyl changed to Levaquin. Completed course 4/28. 4. Acute on chronic respiratory failure with hypercarbia, POA, due to COPD exacerbation and potentially mucus plugging, not very compliant with BIPAP, Pulmo consulted.   5. Severe COPD, with acute exacerbation, DuoNeb, on IV steroids, improved. Will continue steroid taper. 6. 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 No further interventions planned at this time. 7. Acute metabolic encephalopathy, multifactorial. Improving.   8. Elevated LFTs, ?sepsis related, improving, US noted. 9. Hypernatremia, D 5 started.  improved. 10. Uncontrolled HTN, on iv metoprolol and po amlodipine, will increase amlodipine.        Advance diet as tolerated    Diet: DIET FULL LIQUID;  Dietary Nutrition Supplements: Standard High Calorie Oral Supplement  Code Status: Full Code    Disposition-likely discharge in 1 to 2 days pending clinical improvement, continue IV antibiotics while inpatient    Ba Moncada MD

## 2021-04-29 NOTE — PROGRESS NOTES
04/29/21  0436   WBC 7.0   HGB 9.5*   HCT 29.2*         K 3.7      CO2 35*   BUN 8   CREATININE 0.6*   MG 1.90   PHOS 2.1*   CALCIUM 8.1*   AST 33   ALT 70*   BILITOT 0.3   BILIDIR <0.2       EDUCATION  Patient educated about Disease Process, Medications, Smoking Cessation, Oxygenation, Incentive Spirometry and Deep Breath and Cough, Diabetes, Hyperlipidemia, Smoking Cessation, Nutrition, Exercise and Hypertension    Electronically signed by PAUL Martinez CNP on 4/29/2021 at 8:41 AM    Agree with above note. The patient was personally seen and examined. [place] [employer] [facility] is doing OK. Still having some fullness with eating. Denies any nausea or vomiting.  + flatus, no BM. Abd soft, minimally distended, NT    WBC 7    CT shows resolving SBO, moderate stool burden    A/P: 75 yo male with pSBO, COPD exacerbation with pneumonia    CT reassuring that SBO improving. Low fiber diet. Symptoms of fullness may be secondary to constipation. Will start on miralax BID.     Continue abx/steroids per primary team    Rajni Alexis MD

## 2021-04-30 LAB
ALBUMIN SERPL-MCNC: 3.1 G/DL (ref 3.4–5)
ALP BLD-CCNC: 85 U/L (ref 40–129)
ALT SERPL-CCNC: 64 U/L (ref 10–40)
ANION GAP SERPL CALCULATED.3IONS-SCNC: 4 MMOL/L (ref 3–16)
AST SERPL-CCNC: 24 U/L (ref 15–37)
BASOPHILS ABSOLUTE: 0 K/UL (ref 0–0.2)
BASOPHILS RELATIVE PERCENT: 0.3 %
BILIRUB SERPL-MCNC: <0.2 MG/DL (ref 0–1)
BILIRUBIN DIRECT: <0.2 MG/DL (ref 0–0.3)
BILIRUBIN, INDIRECT: ABNORMAL MG/DL (ref 0–1)
BUN BLDV-MCNC: 7 MG/DL (ref 7–20)
CALCIUM SERPL-MCNC: 8.1 MG/DL (ref 8.3–10.6)
CHLORIDE BLD-SCNC: 101 MMOL/L (ref 99–110)
CO2: 35 MMOL/L (ref 21–32)
CREAT SERPL-MCNC: 0.6 MG/DL (ref 0.8–1.3)
EOSINOPHILS ABSOLUTE: 0 K/UL (ref 0–0.6)
EOSINOPHILS RELATIVE PERCENT: 0.8 %
GFR AFRICAN AMERICAN: >60
GFR NON-AFRICAN AMERICAN: >60
GLUCOSE BLD-MCNC: 116 MG/DL (ref 70–99)
HCT VFR BLD CALC: 27.7 % (ref 40.5–52.5)
HEMOGLOBIN: 9.4 G/DL (ref 13.5–17.5)
LYMPHOCYTES ABSOLUTE: 0.9 K/UL (ref 1–5.1)
LYMPHOCYTES RELATIVE PERCENT: 15 %
MAGNESIUM: 2 MG/DL (ref 1.8–2.4)
MCH RBC QN AUTO: 33.9 PG (ref 26–34)
MCHC RBC AUTO-ENTMCNC: 34 G/DL (ref 31–36)
MCV RBC AUTO: 99.5 FL (ref 80–100)
MONOCYTES ABSOLUTE: 0.8 K/UL (ref 0–1.3)
MONOCYTES RELATIVE PERCENT: 12.6 %
NEUTROPHILS ABSOLUTE: 4.4 K/UL (ref 1.7–7.7)
NEUTROPHILS RELATIVE PERCENT: 71.3 %
PDW BLD-RTO: 13.6 % (ref 12.4–15.4)
PHOSPHORUS: 2.4 MG/DL (ref 2.5–4.9)
PLATELET # BLD: 191 K/UL (ref 135–450)
PMV BLD AUTO: 7.3 FL (ref 5–10.5)
POTASSIUM SERPL-SCNC: 3.6 MMOL/L (ref 3.5–5.1)
RBC # BLD: 2.78 M/UL (ref 4.2–5.9)
SODIUM BLD-SCNC: 140 MMOL/L (ref 136–145)
TOTAL PROTEIN: 4.8 G/DL (ref 6.4–8.2)
WBC # BLD: 6.2 K/UL (ref 4–11)

## 2021-04-30 PROCEDURE — 80048 BASIC METABOLIC PNL TOTAL CA: CPT

## 2021-04-30 PROCEDURE — 83735 ASSAY OF MAGNESIUM: CPT

## 2021-04-30 PROCEDURE — 6370000000 HC RX 637 (ALT 250 FOR IP): Performed by: INTERNAL MEDICINE

## 2021-04-30 PROCEDURE — 2060000000 HC ICU INTERMEDIATE R&B

## 2021-04-30 PROCEDURE — 2580000003 HC RX 258: Performed by: NURSE PRACTITIONER

## 2021-04-30 PROCEDURE — 94761 N-INVAS EAR/PLS OXIMETRY MLT: CPT

## 2021-04-30 PROCEDURE — 6360000002 HC RX W HCPCS: Performed by: NURSE PRACTITIONER

## 2021-04-30 PROCEDURE — 6370000000 HC RX 637 (ALT 250 FOR IP): Performed by: SURGERY

## 2021-04-30 PROCEDURE — 85025 COMPLETE CBC W/AUTO DIFF WBC: CPT

## 2021-04-30 PROCEDURE — 84100 ASSAY OF PHOSPHORUS: CPT

## 2021-04-30 PROCEDURE — 2580000003 HC RX 258: Performed by: INTERNAL MEDICINE

## 2021-04-30 PROCEDURE — 99232 SBSQ HOSP IP/OBS MODERATE 35: CPT | Performed by: SURGERY

## 2021-04-30 PROCEDURE — 2500000003 HC RX 250 WO HCPCS: Performed by: NURSE PRACTITIONER

## 2021-04-30 PROCEDURE — 6360000002 HC RX W HCPCS: Performed by: INTERNAL MEDICINE

## 2021-04-30 PROCEDURE — 80076 HEPATIC FUNCTION PANEL: CPT

## 2021-04-30 PROCEDURE — 6370000000 HC RX 637 (ALT 250 FOR IP): Performed by: NURSE PRACTITIONER

## 2021-04-30 PROCEDURE — 94640 AIRWAY INHALATION TREATMENT: CPT

## 2021-04-30 PROCEDURE — 2700000000 HC OXYGEN THERAPY PER DAY

## 2021-04-30 RX ORDER — BISACODYL 10 MG
10 SUPPOSITORY, RECTAL RECTAL 2 TIMES DAILY
Status: DISPENSED | OUTPATIENT
Start: 2021-04-30 | End: 2021-05-01

## 2021-04-30 RX ADMIN — SODIUM CHLORIDE SOLN NEBU 3% 15 ML: 3 NEBU SOLN at 08:21

## 2021-04-30 RX ADMIN — BISACODYL 10 MG: 10 SUPPOSITORY RECTAL at 10:57

## 2021-04-30 RX ADMIN — IPRATROPIUM BROMIDE AND ALBUTEROL SULFATE 1 AMPULE: .5; 3 SOLUTION RESPIRATORY (INHALATION) at 19:53

## 2021-04-30 RX ADMIN — SODIUM CHLORIDE, PRESERVATIVE FREE 5 ML: 5 INJECTION INTRAVENOUS at 09:17

## 2021-04-30 RX ADMIN — APIXABAN 5 MG: 5 TABLET, FILM COATED ORAL at 21:47

## 2021-04-30 RX ADMIN — DEXTROSE AND SODIUM CHLORIDE: 5; 450 INJECTION, SOLUTION INTRAVENOUS at 22:36

## 2021-04-30 RX ADMIN — ARFORMOTEROL TARTRATE 15 MCG: 15 SOLUTION RESPIRATORY (INHALATION) at 08:20

## 2021-04-30 RX ADMIN — POLYETHYLENE GLYCOL 3350 17 G: 17 POWDER, FOR SOLUTION ORAL at 09:48

## 2021-04-30 RX ADMIN — FAMOTIDINE 20 MG: 10 INJECTION, SOLUTION INTRAVENOUS at 21:47

## 2021-04-30 RX ADMIN — MAGNESIUM CITRATE 296 ML: 1.75 LIQUID ORAL at 09:48

## 2021-04-30 RX ADMIN — MORPHINE SULFATE 2 MG: 2 INJECTION, SOLUTION INTRAMUSCULAR; INTRAVENOUS at 04:18

## 2021-04-30 RX ADMIN — MORPHINE SULFATE 2 MG: 2 INJECTION, SOLUTION INTRAMUSCULAR; INTRAVENOUS at 19:49

## 2021-04-30 RX ADMIN — BUDESONIDE 250 MCG: 0.25 SUSPENSION RESPIRATORY (INHALATION) at 19:53

## 2021-04-30 RX ADMIN — SODIUM CHLORIDE SOLN NEBU 3% 15 ML: 3 NEBU SOLN at 12:14

## 2021-04-30 RX ADMIN — DOCUSATE SODIUM 100 MG: 100 CAPSULE, LIQUID FILLED ORAL at 21:47

## 2021-04-30 RX ADMIN — DEXTROSE AND SODIUM CHLORIDE: 5; 450 INJECTION, SOLUTION INTRAVENOUS at 01:20

## 2021-04-30 RX ADMIN — IPRATROPIUM BROMIDE AND ALBUTEROL SULFATE 1 AMPULE: .5; 3 SOLUTION RESPIRATORY (INHALATION) at 12:13

## 2021-04-30 RX ADMIN — PREDNISONE 30 MG: 20 TABLET ORAL at 09:47

## 2021-04-30 RX ADMIN — AMLODIPINE BESYLATE 10 MG: 10 TABLET ORAL at 09:47

## 2021-04-30 RX ADMIN — IPRATROPIUM BROMIDE AND ALBUTEROL SULFATE 1 AMPULE: .5; 3 SOLUTION RESPIRATORY (INHALATION) at 08:21

## 2021-04-30 RX ADMIN — FAMOTIDINE 20 MG: 10 INJECTION, SOLUTION INTRAVENOUS at 09:48

## 2021-04-30 RX ADMIN — ARFORMOTEROL TARTRATE 15 MCG: 15 SOLUTION RESPIRATORY (INHALATION) at 19:53

## 2021-04-30 RX ADMIN — APIXABAN 5 MG: 5 TABLET, FILM COATED ORAL at 09:47

## 2021-04-30 RX ADMIN — IPRATROPIUM BROMIDE AND ALBUTEROL SULFATE 1 AMPULE: .5; 3 SOLUTION RESPIRATORY (INHALATION) at 15:56

## 2021-04-30 RX ADMIN — BUDESONIDE 250 MCG: 0.25 SUSPENSION RESPIRATORY (INHALATION) at 08:20

## 2021-04-30 RX ADMIN — SODIUM CHLORIDE, PRESERVATIVE FREE 10 ML: 5 INJECTION INTRAVENOUS at 21:47

## 2021-04-30 RX ADMIN — METOPROLOL TARTRATE 25 MG: 25 TABLET, FILM COATED ORAL at 21:47

## 2021-04-30 RX ADMIN — METOPROLOL TARTRATE 25 MG: 25 TABLET, FILM COATED ORAL at 09:47

## 2021-04-30 RX ADMIN — IPRATROPIUM BROMIDE AND ALBUTEROL SULFATE 1 AMPULE: .5; 3 SOLUTION RESPIRATORY (INHALATION) at 23:57

## 2021-04-30 RX ADMIN — SODIUM CHLORIDE SOLN NEBU 3% 15 ML: 3 NEBU SOLN at 19:53

## 2021-04-30 RX ADMIN — POLYETHYLENE GLYCOL 3350 17 G: 17 POWDER, FOR SOLUTION ORAL at 21:47

## 2021-04-30 RX ADMIN — DOCUSATE SODIUM 100 MG: 100 CAPSULE, LIQUID FILLED ORAL at 09:48

## 2021-04-30 RX ADMIN — IPRATROPIUM BROMIDE AND ALBUTEROL SULFATE 1 AMPULE: .5; 3 SOLUTION RESPIRATORY (INHALATION) at 04:12

## 2021-04-30 RX ADMIN — MORPHINE SULFATE 2 MG: 2 INJECTION, SOLUTION INTRAMUSCULAR; INTRAVENOUS at 13:14

## 2021-04-30 ASSESSMENT — PAIN SCALES - GENERAL
PAINLEVEL_OUTOF10: 7
PAINLEVEL_OUTOF10: 7
PAINLEVEL_OUTOF10: 8

## 2021-04-30 ASSESSMENT — PAIN DESCRIPTION - ONSET: ONSET: ON-GOING

## 2021-04-30 ASSESSMENT — PAIN DESCRIPTION - LOCATION
LOCATION: ABDOMEN
LOCATION: ABDOMEN

## 2021-04-30 ASSESSMENT — PAIN DESCRIPTION - DESCRIPTORS
DESCRIPTORS: ACHING;DISCOMFORT
DESCRIPTORS: ACHING;DISCOMFORT

## 2021-04-30 ASSESSMENT — PAIN - FUNCTIONAL ASSESSMENT: PAIN_FUNCTIONAL_ASSESSMENT: PREVENTS OR INTERFERES SOME ACTIVE ACTIVITIES AND ADLS

## 2021-04-30 ASSESSMENT — PAIN DESCRIPTION - PROGRESSION: CLINICAL_PROGRESSION: GRADUALLY WORSENING

## 2021-04-30 ASSESSMENT — PAIN DESCRIPTION - PAIN TYPE
TYPE: ACUTE PAIN
TYPE: ACUTE PAIN

## 2021-04-30 ASSESSMENT — PAIN DESCRIPTION - ORIENTATION
ORIENTATION: LOWER;MID
ORIENTATION: LOWER;MID

## 2021-04-30 NOTE — PROGRESS NOTES
Hospitalist Progress Note      PCP: Dimas Westbrook MD    Date of Admission: 4/21/2021      Subjective: Patient seen and examined. Feels well today. Still hasn't had a bowel movement in 3 days. CT yesterday reviewed. Medications:  Reviewed    Infusion Medications    sodium chloride      dextrose 5 % and 0.45 % NaCl 50 mL/hr at 04/30/21 0120     Scheduled Medications    bisacodyl  10 mg Rectal BID    predniSONE  30 mg Oral Daily    metoprolol tartrate  25 mg Oral BID    apixaban  5 mg Oral BID    polyethylene glycol  17 g Oral BID    docusate sodium  100 mg Oral BID    amLODIPine  10 mg Oral Daily    sodium chloride flush  5-40 mL Intravenous 2 times per day    ipratropium-albuterol  1 ampule Inhalation Q4H    famotidine (PEPCID) injection  20 mg Intravenous BID    budesonide  250 mcg Nebulization BID    Arformoterol Tartrate  15 mcg Nebulization BID    sodium chloride (Inhalant)  15 mL Nebulization TID     PRN Meds: sodium chloride flush, sodium chloride, morphine, phenol, albuterol sulfate HFA, albuterol, promethazine **OR** ondansetron      Intake/Output Summary (Last 24 hours) at 4/30/2021 1041  Last data filed at 4/30/2021 0813  Gross per 24 hour   Intake 250 ml   Output 1100 ml   Net -850 ml       Physical Exam Performed:    BP (!) 131/52   Pulse 83   Temp 98.7 °F (37.1 °C)   Resp 12   Ht 5' 7\" (1.702 m)   Wt 112 lb 7 oz (51 kg)   SpO2 98%   BMI 17.61 kg/m²     General appearance: No apparent distress, lying in bed comfortably  Neck: Supple  Respiratory:  Normal respiratory effort. Clear to auscultation, bilaterally without Rales/Wheezes/Rhonchi. Cardiovascular: Regular rate and rhythm with normal S1/S2 without murmurs, rubs or gallops. Abdomen: Soft, tenderness noted, less distended   Musculoskeletal: No clubbing, cyanosis  Skin: Skin color, texture, turgor normal.  No rashes or lesions.   Neurologic:  No focal weakness   Psychiatric: Alert and oriented to person and time, but not place  Capillary Refill: Brisk,3 seconds, normal   Peripheral Pulses: +2 palpable, equal bilaterally       Labs:   Recent Labs     04/28/21  0500 04/29/21  0436 04/30/21  0600   WBC 6.3 7.0 6.2   HGB 9.4* 9.5* 9.4*   HCT 27.7* 29.2* 27.7*    163 191     Recent Labs     04/28/21  0500 04/29/21  0436 04/30/21  0600    140 140   K 3.5 3.7 3.6   CL 99 101 101   CO2 33* 35* 35*   BUN 8 8 7   CREATININE 0.6* 0.6* 0.6*   CALCIUM 7.8* 8.1* 8.1*   PHOS 2.0* 2.1* 2.4*     Recent Labs     04/28/21  0500 04/29/21  0436 04/30/21  0600   AST 24 33 24   ALT 59* 70* 64*   BILIDIR <0.2 <0.2 <0.2   BILITOT 0.3 0.3 <0.2   ALKPHOS 98 96 85     No results for input(s): INR in the last 72 hours. No results for input(s): Olga Lidia Castor in the last 72 hours. Urinalysis:      Lab Results   Component Value Date    NITRU Negative 04/21/2021    WBCUA 7 04/21/2021    BACTERIA 1+ 06/17/2014    RBCUA 2 04/21/2021    RBCUA 2 11/19/2011    BLOODU TRACE 04/21/2021    SPECGRAV >1.030 04/21/2021    GLUCOSEU Negative 04/21/2021       Radiology:  CT ABDOMEN PELVIS W IV CONTRAST Additional Contrast? Oral   Final Result   1. History of small-bowel obstruction with resolving pattern on current exam.   No discrete transition point with dense stool in a non decompressed colon   suggesting constipation. 2. Chronic biliary dilatation likely physiologic following a cholecystectomy. 3. Left adrenal nodule stable, considered a benign adenoma based upon prior   imaging. 4. AAA stable measuring up to 4.5 cm.   5. Remote compression fractures in the lumbar spine with no acute finding. RECOMMENDATIONS:   4.5 cm abdominal aortic aneurysm. Recommend follow-up every 6 months and   vascular consultation. Reference: J Am Sophie Radiol 0510;46:764-246. XR ABDOMEN (2 VIEWS)   Final Result   Resolving ileus         US ABDOMEN LIMITED   Final Result   Normal sonographic appearance the liver.   Marked extrahepatic biliary   dilatation adrenal gland adenoma. RECOMMENDATIONS:   4.4 cm infrarenal abdominal aortic aneurysm. Recommend follow-up every 12   months and vascular consultation. Reference: J Am Sophie Radiol 8204;35:508-540. CT CHEST WO CONTRAST   Final Result   1. Persisting diffuse small bowel dilatation with fluid-filled lumen   consistent with small bowel obstruction proximal to suggestion of focal   mechanical transition point at the central abdomen/right lower quadrant   possibly related to internal hernia. 2. Redemonstration of infrarenal fusiform abdominal aortic aneurysm with   lumen measuring up to 4.4 cm in greatest diameter, as discussed above. 3. Severe atherosclerotic disease involving the bilateral common, internal   and external iliac arterial vasculature and branches with associated   multifocal high-grade stenosis. 4. Celiac trunk origin focal high-grade stenosis secondary to marked   atherosclerotic disease. 5. Severe sigmoid colon diverticulosis without evidence of diverticulitis. 6. Bilateral lower lung lobe scattered \"tree-in-bud\" opacification pattern   consistent with bronchiolitis/small airway disease with posterior lower lung   lobe multifocal mild consolidation consistent with pneumonia. 7. Cholecystectomy with marked reservoir effect, as discussed above. 8. Splenic chronic granulomatous disease. 9. Mild diffuse distention of esophagus with fluid-filled lumen suggesting   association with gastroesophageal reflux. Recommend clinical correlation. 10. Symmetric bilateral renal cortical volume loss suggesting association   with senescent change versus chronic medical renal disease. 11. Redemonstration of suspected left adrenal gland adenoma. RECOMMENDATIONS:   4.4 cm infrarenal abdominal aortic aneurysm. Recommend follow-up every 12   months and vascular consultation. Reference: J Am Sophie Radiol 4094;94:[id number].                  Assessment/Plan:    Active Hospital Problems Supplements: Standard High Calorie Oral Supplement  DIET LOW FIBER;  Code Status: Full Code    Disposition-likely discharge in 1 to 2 days pending clinical improvement    Bob Graves MD

## 2021-04-30 NOTE — PROGRESS NOTES
Comprehensive Nutrition Assessment    Type and Reason for Visit:  Reassess    Nutrition Recommendations/Plan:   Low Fiber   Magic Cup TID  Will monitor nutritional adequacy, nutrition-related labs, weights, BMs, and clinical progress     Nutrition Assessment:  Follow-up. Pt now on Low Fiber with Ensure TID. Pt with constipation, bowel regimen increased. Eating ~50% of meals. Does not like ONS, will d/c. Pt to order Elly Milk. Also will trial [facility]. Malnutrition Assessment:  Malnutrition Status:  Severe malnutrition    Context:  Chronic Illness     Findings of the 6 clinical characteristics of malnutrition:  Energy Intake:  Mild decrease in energy intake (Comment)  Weight Loss:  Unable to assess     Body Fat Loss:  7 - Severe body fat loss Buccal region, Orbital, Triceps   Muscle Mass Loss:  7 - Severe muscle mass loss Temples (temporalis), Clavicles (pectoralis & deltoids), Hand (interosseous)  Fluid Accumulation:  No significant fluid accumulation     Strength:  Not Performed    Estimated Daily Nutrient Needs:  Energy (kcal):  3342-6598 kcal (30-35 kcal/kg CBW- underweight criteria); Weight Used for Energy Requirements:  Current     Protein (g):  75 gm (2gm/kg CBW-underweight criteria); Weight Used for Protein Requirements:  Current        Fluid (ml/day):  Per MD; Method Used for Fluid Requirements:         Nutrition Related Findings:  BM today; reviewed labs      Wounds:  None       Current Nutrition Therapies:    Dietary Nutrition Supplements: Standard High Calorie Oral Supplement  DIET LOW FIBER;     Anthropometric Measures:  · Height: 5' 7\" (170.2 cm)  · Current Body Weight: 112 lb (50.8 kg)   · Admission Body Weight: 88 lb (39.9 kg)    · Ideal Body Weight: 148 lbs; % Ideal Body Weight 75 %   · BMI: 17.5  · Adjusted Body Weight:  ; No Adjustment   · BMI Categories: Underweight (BMI less than 22) age over 72       Nutrition Diagnosis:   · Severe malnutrition related to inadequate

## 2021-04-30 NOTE — PLAN OF CARE
Pt able to express presence/absence of pain and rate pain appropriately using numerical scale. Pain/discomfort being managed with PRN analgesics per MD orders (see MAR). Pain assessed every shift and after interventions. Safe environment was maintained for patient during this shift. Bed in lowest position with wheels locked. Call light in reach of patient and appropriate ID bands in place. Skin assessment performed each shift per protocol. Patient turned and repositioned every two hours and prn with pillow support. Patient checked for incontence every two hours. Continuing to work with patient and health care team on discharge plan. Discharge instructions and medication management will be reviewed prior to discharge. Patient free from falls this shift. Fall precautions in place at all times. Bed in lowest position with two side rails up and wheels locked. Call light within reach. Patient able and agreeable to contact for safety appropriately.

## 2021-04-30 NOTE — PLAN OF CARE
Problem: Nutrition  Goal: Optimal nutrition therapy  4/30/2021 1304 by Simin Dale RD, LD  Outcome: Ongoing  4/30/2021 0228 by Betty Schlatter, RN  Outcome: Ongoing     Nutrition Problem #1: Severe malnutrition  Intervention: Food and/or Nutrient Delivery: Continue Current Diet, Modify Oral Nutrition Supplement  Nutritional Goals:  Tolerate the most appropriate form of nutrition per provider

## 2021-04-30 NOTE — PROGRESS NOTES
[facility] [phone]                                     Daily Progress Note                                                         Pt Name: [facility]  Medical Record Number: [id number]  Date of Birth 1945   Today's Date: 4/30/2021  CC: SBO vs ileus    ASSESSMENT/PLAN  77 yo male with severe COPD, bilateral LL pneumonia, SBO vs ileus     -SBO vs ileus  - Pt pulled out NG 4/22.  -Denies any nausea or emesis, + flatus, last BM two days ago   -Continue IV hydration. Poor surgical candidate. CT with IV/oral contrast 4/29 no obstruction, moderate stool burden. Increased bowel regimen, will do mag citrate today.      -Severe COPD, bilateral LL pneumonia, hypercarbia - per pulmonology. On vancomycin, levaquin. Steroids per pulmonology     -Hx of HTN, weight loss, deconditioning - per primary team.     -Severe malnutrition - patient states he does not like boost/ensure. Chocolate milk with meals       SUBJECTIVE  [facility] is about the same. Denies nausea. Tolerating some fulls. Passing flatus, no BM x 3 days. OBJECTIVE  VITALS:  height is 5' 7\" (1.702 m) and weight is 112 lb 7 oz (51 kg). His temperature is 98.7 °F (37.1 °C). His blood pressure is 131/52 (abnormal) and his pulse is 83. His respiration is 12 and oxygen saturation is 98%. INTAKE/OUTPUT:      Intake/Output Summary (Last 24 hours) at 4/30/2021 0901  Last data filed at 4/30/2021 0813  Gross per 24 hour   Intake 490 ml   Output 1425 ml   Net -935 ml     GENERAL: alert, cooperative, no distress  LUNGS: normal respiratory effort, no accessory muscle use  HEART: normal rate and regular rhythm  ABDOMEN:  Soft, + distended, mild periumbilical tenderness. Scars consistent with surgical history. EXTREMITY: no cyanosis, clubbing or edema    I/O last 3 completed shifts:   In: 1200 [P.O.:1200]  Out: 1925 [Urine:1925]      LABS  Recent Labs     04/30/21  0600   WBC 6.2   HGB 9.4* no

## 2021-04-30 NOTE — PLAN OF CARE
Problem: Pain:  Goal: Pain level will decrease  Description: Pain level will decrease  Outcome: Ongoing     Problem: Pain:  Goal: Control of acute pain  Description: Control of acute pain  Outcome: Ongoing     Problem: Pain:  Goal: Control of chronic pain  Description: Control of chronic pain  Outcome: Ongoing     Problem: Pain:  Goal: Patient's pain/discomfort is manageable  Description: Patient's pain/discomfort is manageable  Outcome: Ongoing     Problem: Nutrition  Goal: Optimal nutrition therapy  Outcome: Ongoing     Problem: Infection:  Goal: Will remain free from infection  Description: Will remain free from infection  Outcome: Ongoing     Problem: Safety:  Goal: Free from accidental physical injury  Description: Free from accidental physical injury  Outcome: Ongoing     Problem: Safety:  Goal: Free from intentional harm  Description: Free from intentional harm  Outcome: Ongoing     Problem: Skin Integrity:  Goal: Skin integrity will stabilize  Description: Skin integrity will stabilize  Outcome: Ongoing     Problem: Skin Integrity:  Goal: Will show no infection signs and symptoms  Description: Will show no infection signs and symptoms  Outcome: Ongoing     Problem: Skin Integrity:  Goal: Absence of new skin breakdown  Description: Absence of new skin breakdown  Outcome: Ongoing     Problem: Falls - Risk of:  Goal: Will remain free from falls  Description: Will remain free from falls  Outcome: Ongoing     Problem: Falls - Risk of:  Goal: Absence of physical injury  Description: Absence of physical injury  Outcome: Ongoing

## 2021-05-01 LAB
ALBUMIN SERPL-MCNC: 3.1 G/DL (ref 3.4–5)
ALP BLD-CCNC: 83 U/L (ref 40–129)
ALT SERPL-CCNC: 57 U/L (ref 10–40)
ANION GAP SERPL CALCULATED.3IONS-SCNC: 4 MMOL/L (ref 3–16)
AST SERPL-CCNC: 21 U/L (ref 15–37)
BASOPHILS ABSOLUTE: 0.1 K/UL (ref 0–0.2)
BASOPHILS RELATIVE PERCENT: 0.7 %
BILIRUB SERPL-MCNC: <0.2 MG/DL (ref 0–1)
BILIRUBIN DIRECT: <0.2 MG/DL (ref 0–0.3)
BILIRUBIN, INDIRECT: ABNORMAL MG/DL (ref 0–1)
BUN BLDV-MCNC: 9 MG/DL (ref 7–20)
CALCIUM SERPL-MCNC: 7.8 MG/DL (ref 8.3–10.6)
CHLORIDE BLD-SCNC: 101 MMOL/L (ref 99–110)
CO2: 35 MMOL/L (ref 21–32)
CREAT SERPL-MCNC: 0.6 MG/DL (ref 0.8–1.3)
EOSINOPHILS ABSOLUTE: 0.2 K/UL (ref 0–0.6)
EOSINOPHILS RELATIVE PERCENT: 2.2 %
GFR AFRICAN AMERICAN: >60
GFR NON-AFRICAN AMERICAN: >60
GLUCOSE BLD-MCNC: 77 MG/DL (ref 70–99)
HCT VFR BLD CALC: 28 % (ref 40.5–52.5)
HEMOGLOBIN: 9.3 G/DL (ref 13.5–17.5)
LYMPHOCYTES ABSOLUTE: 1.1 K/UL (ref 1–5.1)
LYMPHOCYTES RELATIVE PERCENT: 13.9 %
MAGNESIUM: 1.9 MG/DL (ref 1.8–2.4)
MCH RBC QN AUTO: 33.4 PG (ref 26–34)
MCHC RBC AUTO-ENTMCNC: 33.4 G/DL (ref 31–36)
MCV RBC AUTO: 99.8 FL (ref 80–100)
MONOCYTES ABSOLUTE: 0.8 K/UL (ref 0–1.3)
MONOCYTES RELATIVE PERCENT: 10.2 %
NEUTROPHILS ABSOLUTE: 5.5 K/UL (ref 1.7–7.7)
NEUTROPHILS RELATIVE PERCENT: 73 %
PDW BLD-RTO: 13.5 % (ref 12.4–15.4)
PHOSPHORUS: 2 MG/DL (ref 2.5–4.9)
PLATELET # BLD: 202 K/UL (ref 135–450)
PMV BLD AUTO: 7.5 FL (ref 5–10.5)
POTASSIUM SERPL-SCNC: 3.4 MMOL/L (ref 3.5–5.1)
RBC # BLD: 2.8 M/UL (ref 4.2–5.9)
SODIUM BLD-SCNC: 140 MMOL/L (ref 136–145)
TOTAL PROTEIN: 4.8 G/DL (ref 6.4–8.2)
WBC # BLD: 7.6 K/UL (ref 4–11)

## 2021-05-01 PROCEDURE — 99232 SBSQ HOSP IP/OBS MODERATE 35: CPT | Performed by: SURGERY

## 2021-05-01 PROCEDURE — 84100 ASSAY OF PHOSPHORUS: CPT

## 2021-05-01 PROCEDURE — 2500000003 HC RX 250 WO HCPCS: Performed by: NURSE PRACTITIONER

## 2021-05-01 PROCEDURE — 6370000000 HC RX 637 (ALT 250 FOR IP): Performed by: INTERNAL MEDICINE

## 2021-05-01 PROCEDURE — 6360000002 HC RX W HCPCS: Performed by: INTERNAL MEDICINE

## 2021-05-01 PROCEDURE — 2580000003 HC RX 258: Performed by: INTERNAL MEDICINE

## 2021-05-01 PROCEDURE — 6360000002 HC RX W HCPCS: Performed by: NURSE PRACTITIONER

## 2021-05-01 PROCEDURE — 80048 BASIC METABOLIC PNL TOTAL CA: CPT

## 2021-05-01 PROCEDURE — 2700000000 HC OXYGEN THERAPY PER DAY

## 2021-05-01 PROCEDURE — 2060000000 HC ICU INTERMEDIATE R&B

## 2021-05-01 PROCEDURE — 6370000000 HC RX 637 (ALT 250 FOR IP): Performed by: NURSE PRACTITIONER

## 2021-05-01 PROCEDURE — 2580000003 HC RX 258: Performed by: NURSE PRACTITIONER

## 2021-05-01 PROCEDURE — 6370000000 HC RX 637 (ALT 250 FOR IP): Performed by: SURGERY

## 2021-05-01 PROCEDURE — 94760 N-INVAS EAR/PLS OXIMETRY 1: CPT

## 2021-05-01 PROCEDURE — 85025 COMPLETE CBC W/AUTO DIFF WBC: CPT

## 2021-05-01 PROCEDURE — 80076 HEPATIC FUNCTION PANEL: CPT

## 2021-05-01 PROCEDURE — 2500000003 HC RX 250 WO HCPCS: Performed by: INTERNAL MEDICINE

## 2021-05-01 PROCEDURE — 94640 AIRWAY INHALATION TREATMENT: CPT

## 2021-05-01 PROCEDURE — 83735 ASSAY OF MAGNESIUM: CPT

## 2021-05-01 RX ORDER — POTASSIUM CHLORIDE 20 MEQ/1
40 TABLET, EXTENDED RELEASE ORAL ONCE
Status: COMPLETED | OUTPATIENT
Start: 2021-05-01 | End: 2021-05-01

## 2021-05-01 RX ADMIN — SODIUM PHOSPHATE, MONOBASIC, MONOHYDRATE 8.58 MMOL: 276; 142 INJECTION, SOLUTION INTRAVENOUS at 17:44

## 2021-05-01 RX ADMIN — BUDESONIDE 250 MCG: 0.25 SUSPENSION RESPIRATORY (INHALATION) at 20:25

## 2021-05-01 RX ADMIN — APIXABAN 5 MG: 5 TABLET, FILM COATED ORAL at 20:42

## 2021-05-01 RX ADMIN — AMLODIPINE BESYLATE 10 MG: 10 TABLET ORAL at 08:53

## 2021-05-01 RX ADMIN — SODIUM CHLORIDE, PRESERVATIVE FREE 10 ML: 5 INJECTION INTRAVENOUS at 08:55

## 2021-05-01 RX ADMIN — DOCUSATE SODIUM 100 MG: 100 CAPSULE, LIQUID FILLED ORAL at 20:42

## 2021-05-01 RX ADMIN — SODIUM CHLORIDE SOLN NEBU 3% 15 ML: 3 NEBU SOLN at 20:24

## 2021-05-01 RX ADMIN — IPRATROPIUM BROMIDE AND ALBUTEROL SULFATE 1 AMPULE: .5; 3 SOLUTION RESPIRATORY (INHALATION) at 15:23

## 2021-05-01 RX ADMIN — IPRATROPIUM BROMIDE AND ALBUTEROL SULFATE 1 AMPULE: .5; 3 SOLUTION RESPIRATORY (INHALATION) at 11:48

## 2021-05-01 RX ADMIN — BUDESONIDE 250 MCG: 0.25 SUSPENSION RESPIRATORY (INHALATION) at 08:11

## 2021-05-01 RX ADMIN — IPRATROPIUM BROMIDE AND ALBUTEROL SULFATE 1 AMPULE: .5; 3 SOLUTION RESPIRATORY (INHALATION) at 08:05

## 2021-05-01 RX ADMIN — PREDNISONE 30 MG: 20 TABLET ORAL at 08:52

## 2021-05-01 RX ADMIN — ARFORMOTEROL TARTRATE 15 MCG: 15 SOLUTION RESPIRATORY (INHALATION) at 08:05

## 2021-05-01 RX ADMIN — MORPHINE SULFATE 2 MG: 2 INJECTION, SOLUTION INTRAMUSCULAR; INTRAVENOUS at 03:53

## 2021-05-01 RX ADMIN — FAMOTIDINE 20 MG: 10 INJECTION, SOLUTION INTRAVENOUS at 20:42

## 2021-05-01 RX ADMIN — POLYETHYLENE GLYCOL 3350 17 G: 17 POWDER, FOR SOLUTION ORAL at 20:44

## 2021-05-01 RX ADMIN — METOPROLOL TARTRATE 25 MG: 25 TABLET, FILM COATED ORAL at 20:42

## 2021-05-01 RX ADMIN — POTASSIUM CHLORIDE 40 MEQ: 1500 TABLET, EXTENDED RELEASE ORAL at 14:55

## 2021-05-01 RX ADMIN — SODIUM CHLORIDE SOLN NEBU 3% 15 ML: 3 NEBU SOLN at 08:06

## 2021-05-01 RX ADMIN — POLYETHYLENE GLYCOL 3350 17 G: 17 POWDER, FOR SOLUTION ORAL at 08:52

## 2021-05-01 RX ADMIN — IPRATROPIUM BROMIDE AND ALBUTEROL SULFATE 2 AMPULE: .5; 3 SOLUTION RESPIRATORY (INHALATION) at 20:24

## 2021-05-01 RX ADMIN — METOPROLOL TARTRATE 25 MG: 25 TABLET, FILM COATED ORAL at 08:53

## 2021-05-01 RX ADMIN — MORPHINE SULFATE 2 MG: 2 INJECTION, SOLUTION INTRAMUSCULAR; INTRAVENOUS at 14:58

## 2021-05-01 RX ADMIN — APIXABAN 5 MG: 5 TABLET, FILM COATED ORAL at 08:52

## 2021-05-01 RX ADMIN — ARFORMOTEROL TARTRATE 15 MCG: 15 SOLUTION RESPIRATORY (INHALATION) at 20:23

## 2021-05-01 RX ADMIN — DOCUSATE SODIUM 100 MG: 100 CAPSULE, LIQUID FILLED ORAL at 08:52

## 2021-05-01 RX ADMIN — SODIUM CHLORIDE SOLN NEBU 3% 15 ML: 3 NEBU SOLN at 11:48

## 2021-05-01 RX ADMIN — FAMOTIDINE 20 MG: 10 INJECTION, SOLUTION INTRAVENOUS at 08:52

## 2021-05-01 RX ADMIN — IPRATROPIUM BROMIDE AND ALBUTEROL SULFATE 1 AMPULE: .5; 3 SOLUTION RESPIRATORY (INHALATION) at 03:59

## 2021-05-01 RX ADMIN — Medication 10 ML: at 20:42

## 2021-05-01 ASSESSMENT — PAIN DESCRIPTION - LOCATION
LOCATION: ABDOMEN

## 2021-05-01 ASSESSMENT — PAIN DESCRIPTION - DESCRIPTORS
DESCRIPTORS: ACHING;DISCOMFORT
DESCRIPTORS: ACHING

## 2021-05-01 ASSESSMENT — PAIN DESCRIPTION - PAIN TYPE
TYPE: ACUTE PAIN

## 2021-05-01 ASSESSMENT — PAIN DESCRIPTION - PROGRESSION
CLINICAL_PROGRESSION: GRADUALLY WORSENING
CLINICAL_PROGRESSION: GRADUALLY WORSENING

## 2021-05-01 ASSESSMENT — PAIN DESCRIPTION - ORIENTATION
ORIENTATION: MID;LOWER
ORIENTATION: MID;LOWER

## 2021-05-01 ASSESSMENT — PAIN SCALES - GENERAL
PAINLEVEL_OUTOF10: 5
PAINLEVEL_OUTOF10: 6
PAINLEVEL_OUTOF10: 6
PAINLEVEL_OUTOF10: 8
PAINLEVEL_OUTOF10: 5

## 2021-05-01 ASSESSMENT — PAIN DESCRIPTION - FREQUENCY: FREQUENCY: CONTINUOUS

## 2021-05-01 NOTE — PROGRESS NOTES
[facility] [phone]                                     Daily Progress Note                                                         Pt Name: Javier De Leon  Medical Record Number: [id number]  Date of Birth 1945   Today's Date: 5/1/2021  CC: SBO vs ileus    ASSESSMENT/PLAN  77 yo male with severe COPD, bilateral LL pneumonia, SBO vs ileus     -SBO vs ileus - resolving  -BM yesterday. Some mild tenderness suprapubic   -continue bowel regimen, diet as tolerated        Janene Harris reports improved pain today. Denies nausea. Tolerating some PO. Passing flatus, BM yesterday. OBJECTIVE  VITALS:  height is 5' 7\" (1.702 m) and weight is 118 lb 2.7 oz (53.6 kg). His oral temperature is 98.7 °F (37.1 °C). His blood pressure is 145/63 (abnormal) and his pulse is 82. His respiration is 16 and oxygen saturation is 97%. INTAKE/OUTPUT:      Intake/Output Summary (Last 24 hours) at 5/1/2021 0918  Last data filed at 5/1/2021 0636  Gross per 24 hour   Intake    Output 1400 ml   Net -1400 ml     GENERAL: alert, cooperative, no distress  LUNGS: normal respiratory effort, no accessory muscle use  HEART: normal rate and regular rhythm  ABDOMEN:  Soft, mildly distended, mild periumbilical tenderness. Scars consistent with surgical history. EXTREMITY: no cyanosis, clubbing or edema    I/O last 3 completed shifts:   In: 10 [P.O.:10]  Out: 1400 [Urine:1400]      LABS  Recent Labs     05/01/21  0619   WBC 7.6   HGB 9.3*   HCT 28.0*         K 3.4*      CO2 35*   BUN 9   CREATININE 0.6*   MG 1.90   PHOS 2.0*   CALCIUM 7.8*   AST 21   ALT 57*   BILITOT <0.2   BILIDIR <0.2       Electronically signed by Marylen Ditch, MD on 5/1/2021 at 9:18 AM

## 2021-05-01 NOTE — PLAN OF CARE
Goal: Absence of physical injury  Description: Absence of physical injury  5/1/2021 0108 by Lonnie Enciso RN  Outcome: Ongoing

## 2021-05-01 NOTE — PLAN OF CARE
Problem: Pain:  Goal: Pain level will decrease  Description: Pain level will decrease  5/1/2021 1121 by Amber Antonio RN  Outcome: Ongoing     Problem: Pain:  Goal: Control of acute pain  Description: Control of acute pain  5/1/2021 1121 by Amber Antonio RN  Outcome: Ongoing     Problem: Pain:  Goal: Control of chronic pain  Description: Control of chronic pain  5/1/2021 1121 by Amber Antonio RN  Outcome: Ongoing     Problem: Pain:  Goal: Patient's pain/discomfort is manageable  Description: Patient's pain/discomfort is manageable  5/1/2021 1121 by Amber Antonio RN  Outcome: Ongoing     Problem: Nutrition  Goal: Optimal nutrition therapy  5/1/2021 1121 by Amber Antonio RN  Outcome: Ongoing     Problem: Infection:  Goal: Will remain free from infection  Description: Will remain free from infection  5/1/2021 1121 by Amber Antonio RN  Outcome: Ongoing     Problem: Safety:  Goal: Free from accidental physical injury  Description: Free from accidental physical injury  5/1/2021 1121 by Amber Antonio RN  Outcome: Ongoing     Problem: Safety:  Goal: Free from intentional harm  Description: Free from intentional harm  5/1/2021 1121 by Amber Antonio RN  Outcome: Ongoing     Problem: Daily Care:  Goal: Daily care needs are met  Description: Daily care needs are met  5/1/2021 1121 by Amber Antonio RN  Outcome: Ongoing     Problem: Skin Integrity:  Goal: Skin integrity will stabilize  Description: Skin integrity will stabilize  5/1/2021 1121 by Amber Antonio RN  Outcome: Ongoing     Problem: Discharge Planning:  Goal: Patients continuum of care needs are met  Description: Patients continuum of care needs are met  5/1/2021 1121 by Amber Antonio RN  Outcome: Ongoing     Problem: Skin Integrity:  Goal: Will show no infection signs and symptoms  Description: Will show no infection signs and symptoms  5/1/2021 1121 by Amber Antonio RN  Outcome: Ongoing     Problem: Skin Integrity:  Goal: Absence of new skin breakdown  Description: Absence of new skin breakdown  5/1/2021 1121 by Jessica Aburto RN  Outcome: Ongoing     Problem: Falls - Risk of:  Goal: Will remain free from falls  Description: Will remain free from falls  5/1/2021 1121 by Jessica Aburto RN  Outcome: Ongoing     Problem: Falls - Risk of:  Goal: Absence of physical injury  Description: Absence of physical injury  5/1/2021 1121 by Jessica Aburto RN  Outcome: Ongoing

## 2021-05-01 NOTE — PROGRESS NOTES
Hospitalist Progress Note      PCP: Rosalinda Reyes MD    Date of Admission: 4/21/2021      Subjective: Patient seen and examined. Feels well today. No complaints. Medications:  Reviewed    Infusion Medications    sodium chloride      dextrose 5 % and 0.45 % NaCl 50 mL/hr at 04/30/21 2236     Scheduled Medications    predniSONE  30 mg Oral Daily    metoprolol tartrate  25 mg Oral BID    apixaban  5 mg Oral BID    polyethylene glycol  17 g Oral BID    docusate sodium  100 mg Oral BID    amLODIPine  10 mg Oral Daily    sodium chloride flush  5-40 mL Intravenous 2 times per day    ipratropium-albuterol  1 ampule Inhalation Q4H    famotidine (PEPCID) injection  20 mg Intravenous BID    budesonide  250 mcg Nebulization BID    Arformoterol Tartrate  15 mcg Nebulization BID    sodium chloride (Inhalant)  15 mL Nebulization TID     PRN Meds: sodium phosphate IVPB **OR** sodium phosphate IVPB, sodium chloride flush, sodium chloride, morphine, phenol, albuterol sulfate HFA, albuterol, promethazine **OR** ondansetron      Intake/Output Summary (Last 24 hours) at 5/1/2021 1815  Last data filed at 5/1/2021 1000  Gross per 24 hour   Intake    Output 1240 ml   Net -1240 ml       Physical Exam Performed:    /61   Pulse 71   Temp 97.7 °F (36.5 °C) (Oral)   Resp 16   Ht 5' 7\" (1.702 m)   Wt 118 lb 2.7 oz (53.6 kg)   SpO2 98%   BMI 18.51 kg/m²     General appearance: No apparent distress, lying in bed comfortably  Neck: Supple  Respiratory:  Normal respiratory effort. Clear to auscultation, bilaterally without Rales/Wheezes/Rhonchi. Cardiovascular: Regular rate and rhythm with normal S1/S2 without murmurs, rubs or gallops. Abdomen: Soft, tenderness noted, less distended   Musculoskeletal: No clubbing, cyanosis  Skin: Skin color, texture, turgor normal.  No rashes or lesions.   Neurologic:  No focal weakness   Psychiatric: Alert and oriented to person and time, but not place  Capillary Refill: Brisk,3 seconds, normal   Peripheral Pulses: +2 palpable, equal bilaterally       Labs:   Recent Labs     04/29/21 0436 04/30/21 0600 05/01/21  0619   WBC 7.0 6.2 7.6   HGB 9.5* 9.4* 9.3*   HCT 29.2* 27.7* 28.0*    191 202     Recent Labs     04/29/21 0436 04/30/21 0600 05/01/21  0619    140 140   K 3.7 3.6 3.4*    101 101   CO2 35* 35* 35*   BUN 8 7 9   CREATININE 0.6* 0.6* 0.6*   CALCIUM 8.1* 8.1* 7.8*   PHOS 2.1* 2.4* 2.0*     Recent Labs     04/29/21 0436 04/30/21 0600 05/01/21  0619   AST 33 24 21   ALT 70* 64* 57*   BILIDIR <0.2 <0.2 <0.2   BILITOT 0.3 <0.2 <0.2   ALKPHOS 96 85 83     No results for input(s): INR in the last 72 hours. No results for input(s): Kera Lemme in the last 72 hours. Urinalysis:      Lab Results   Component Value Date    NITRU Negative 04/21/2021    WBCUA 7 04/21/2021    BACTERIA 1+ 06/17/2014    RBCUA 2 04/21/2021    RBCUA 2 11/19/2011    BLOODU TRACE 04/21/2021    SPECGRAV >1.030 04/21/2021    GLUCOSEU Negative 04/21/2021       Radiology:  CT ABDOMEN PELVIS W IV CONTRAST Additional Contrast? Oral   Final Result   1. History of small-bowel obstruction with resolving pattern on current exam.   No discrete transition point with dense stool in a non decompressed colon   suggesting constipation. 2. Chronic biliary dilatation likely physiologic following a cholecystectomy. 3. Left adrenal nodule stable, considered a benign adenoma based upon prior   imaging. 4. AAA stable measuring up to 4.5 cm.   5. Remote compression fractures in the lumbar spine with no acute finding. RECOMMENDATIONS:   4.5 cm abdominal aortic aneurysm. Recommend follow-up every 6 months and   vascular consultation. Reference: J Am [facility]iol 6149;43:158-480. XR ABDOMEN (2 VIEWS)   Final Result   Resolving ileus         US ABDOMEN LIMITED   Final Result   Normal sonographic appearance the liver.   Marked extrahepatic biliary   dilatation without significant intrahepatic biliary dilatation. Dilatation   bleed related to prior cholecystectomy and patient's age. No acute   abnormality. XR ABDOMEN (KUB) (SINGLE AP VIEW)   Final Result   Nasogastric tube projects to the proximal stomach, normal position. Persistent dilated loops of small bowel in the upper abdomen. XR CHEST PORTABLE   Final Result   No acute cardiac or pulmonary disease. NG tube extends into the stomach. CTA ABDOMEN PELVIS W CONTRAST   Final Result   1. Persisting diffuse small bowel dilatation with fluid-filled lumen   consistent with small bowel obstruction proximal to suggestion of focal   mechanical transition point at the central abdomen/right lower quadrant   possibly related to internal hernia. 2. Redemonstration of infrarenal fusiform abdominal aortic aneurysm with   lumen measuring up to 4.4 cm in greatest diameter, as discussed above. 3. Severe atherosclerotic disease involving the bilateral common, internal   and external iliac arterial vasculature and branches with associated   multifocal high-grade stenosis. 4. Celiac trunk origin focal high-grade stenosis secondary to marked   atherosclerotic disease. 5. Severe sigmoid colon diverticulosis without evidence of diverticulitis. 6. Bilateral lower lung lobe scattered \"tree-in-bud\" opacification pattern   consistent with bronchiolitis/small airway disease with posterior lower lung   lobe multifocal mild consolidation consistent with pneumonia. 7. Cholecystectomy with marked reservoir effect, as discussed above. 8. Splenic chronic granulomatous disease. 9. Mild diffuse distention of esophagus with fluid-filled lumen suggesting   association with gastroesophageal reflux. Recommend clinical correlation. 10. Symmetric bilateral renal cortical volume loss suggesting association   with senescent change versus chronic medical renal disease.    11. Redemonstration of suspected left adrenal gland adenoma. RECOMMENDATIONS:   4.4 cm infrarenal abdominal aortic aneurysm. Recommend follow-up every 12   months and vascular consultation. Reference: [facility] 7550;17:647-106. CT CHEST WO CONTRAST   Final Result   1. Persisting diffuse small bowel dilatation with fluid-filled lumen   consistent with small bowel obstruction proximal to suggestion of focal   mechanical transition point at the central abdomen/right lower quadrant   possibly related to internal hernia. 2. Redemonstration of infrarenal fusiform abdominal aortic aneurysm with   lumen measuring up to 4.4 cm in greatest diameter, as discussed above. 3. Severe atherosclerotic disease involving the bilateral common, internal   and external iliac arterial vasculature and branches with associated   multifocal high-grade stenosis. 4. Celiac trunk origin focal high-grade stenosis secondary to marked   atherosclerotic disease. 5. Severe sigmoid colon diverticulosis without evidence of diverticulitis. 6. Bilateral lower lung lobe scattered \"tree-in-bud\" opacification pattern   consistent with bronchiolitis/small airway disease with posterior lower lung   lobe multifocal mild consolidation consistent with pneumonia. 7. Cholecystectomy with marked reservoir effect, as discussed above. 8. Splenic chronic granulomatous disease. 9. Mild diffuse distention of esophagus with fluid-filled lumen suggesting   association with gastroesophageal reflux. Recommend clinical correlation. 10. Symmetric bilateral renal cortical volume loss suggesting association   with senescent change versus chronic medical renal disease. 11. Redemonstration of suspected left adrenal gland adenoma. RECOMMENDATIONS:   4.4 cm infrarenal abdominal aortic aneurysm. Recommend follow-up every 12   months and vascular consultation. Reference: J Am [facility]iol 6048;53:589-251.                  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 Sepsis, likely due to MRSA pneumonia, IV antibiotics started patient on cefepime Flagyl and vancomycin, changed to Vanc and levaquin, pulmo consulted. transferred out of ICU. Completed Abx course 4/28. 2. SBO, iv fluids, GS consulted. Had BM yesterday. Upgrade to low fiber diet. Diet advancement per GS. CT Ab/Pelvis with contrast showed resolving SBP, dense stool. Continue bowel regimen. 3. MRSA Pneumonia, POA, cefepime vancomycin and Flagyl changed to Levaquin. Completed course 4/28. 4. Acute on chronic respiratory failure with hypercarbia, POA, due to COPD exacerbation and potentially mucus plugging, not very compliant with BIPAP, Pulmo consulted.   5. Severe COPD, with acute exacerbation, DuoNeb, on IV steroids, improved. Will continue steroid taper. 6. 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 No further interventions planned at this time. 7. Acute metabolic encephalopathy, multifactorial. Improving.   8. Elevated LFTs, ?sepsis related, improving, US noted. 9. Hypernatremia, D 5 started.  improved. 10. Uncontrolled HTN, on iv metoprolol and po amlodipine, will increase amlodipine. 11. AAA - needs follow-up with vascular surgery and imaging too. Will make referral at discharge. Advance diet as tolerated    Diet: DIET LOW FIBER;   Dietary Nutrition Supplements: Frozen Oral Supplement  Code Status: Full Code    Disposition-likely discharge in 1 to 2 days pending clinical improvement    Paolo Dumas MD

## 2021-05-02 LAB
ALBUMIN SERPL-MCNC: 3.2 G/DL (ref 3.4–5)
ALP BLD-CCNC: 86 U/L (ref 40–129)
ALT SERPL-CCNC: 54 U/L (ref 10–40)
ANION GAP SERPL CALCULATED.3IONS-SCNC: 3 MMOL/L (ref 3–16)
AST SERPL-CCNC: 19 U/L (ref 15–37)
BASOPHILS ABSOLUTE: 0.1 K/UL (ref 0–0.2)
BASOPHILS RELATIVE PERCENT: 0.5 %
BILIRUB SERPL-MCNC: 0.3 MG/DL (ref 0–1)
BILIRUBIN DIRECT: <0.2 MG/DL (ref 0–0.3)
BILIRUBIN, INDIRECT: ABNORMAL MG/DL (ref 0–1)
BUN BLDV-MCNC: 10 MG/DL (ref 7–20)
CALCIUM SERPL-MCNC: 8 MG/DL (ref 8.3–10.6)
CHLORIDE BLD-SCNC: 101 MMOL/L (ref 99–110)
CO2: 35 MMOL/L (ref 21–32)
CREAT SERPL-MCNC: <0.5 MG/DL (ref 0.8–1.3)
EOSINOPHILS ABSOLUTE: 0 K/UL (ref 0–0.6)
EOSINOPHILS RELATIVE PERCENT: 0.3 %
GFR AFRICAN AMERICAN: >60
GFR NON-AFRICAN AMERICAN: >60
GLUCOSE BLD-MCNC: 107 MG/DL (ref 70–99)
HCT VFR BLD CALC: 29.3 % (ref 40.5–52.5)
HEMOGLOBIN: 9.9 G/DL (ref 13.5–17.5)
LYMPHOCYTES ABSOLUTE: 0.8 K/UL (ref 1–5.1)
LYMPHOCYTES RELATIVE PERCENT: 7.6 %
MAGNESIUM: 1.9 MG/DL (ref 1.8–2.4)
MCH RBC QN AUTO: 33.8 PG (ref 26–34)
MCHC RBC AUTO-ENTMCNC: 33.8 G/DL (ref 31–36)
MCV RBC AUTO: 99.9 FL (ref 80–100)
MONOCYTES ABSOLUTE: 0.7 K/UL (ref 0–1.3)
MONOCYTES RELATIVE PERCENT: 6.7 %
NEUTROPHILS ABSOLUTE: 9.2 K/UL (ref 1.7–7.7)
NEUTROPHILS RELATIVE PERCENT: 84.9 %
PDW BLD-RTO: 13.5 % (ref 12.4–15.4)
PHOSPHORUS: 2.3 MG/DL (ref 2.5–4.9)
PLATELET # BLD: 221 K/UL (ref 135–450)
PMV BLD AUTO: 7.3 FL (ref 5–10.5)
POTASSIUM SERPL-SCNC: 4.3 MMOL/L (ref 3.5–5.1)
RBC # BLD: 2.94 M/UL (ref 4.2–5.9)
SODIUM BLD-SCNC: 139 MMOL/L (ref 136–145)
TOTAL PROTEIN: 5.1 G/DL (ref 6.4–8.2)
WBC # BLD: 10.8 K/UL (ref 4–11)

## 2021-05-02 PROCEDURE — 6360000002 HC RX W HCPCS: Performed by: NURSE PRACTITIONER

## 2021-05-02 PROCEDURE — 2700000000 HC OXYGEN THERAPY PER DAY

## 2021-05-02 PROCEDURE — 6370000000 HC RX 637 (ALT 250 FOR IP): Performed by: INTERNAL MEDICINE

## 2021-05-02 PROCEDURE — 2060000000 HC ICU INTERMEDIATE R&B

## 2021-05-02 PROCEDURE — 6360000002 HC RX W HCPCS: Performed by: INTERNAL MEDICINE

## 2021-05-02 PROCEDURE — 2580000003 HC RX 258: Performed by: INTERNAL MEDICINE

## 2021-05-02 PROCEDURE — 2500000003 HC RX 250 WO HCPCS: Performed by: NURSE PRACTITIONER

## 2021-05-02 PROCEDURE — 80076 HEPATIC FUNCTION PANEL: CPT

## 2021-05-02 PROCEDURE — 94760 N-INVAS EAR/PLS OXIMETRY 1: CPT

## 2021-05-02 PROCEDURE — 99231 SBSQ HOSP IP/OBS SF/LOW 25: CPT | Performed by: SURGERY

## 2021-05-02 PROCEDURE — 94640 AIRWAY INHALATION TREATMENT: CPT

## 2021-05-02 PROCEDURE — 2580000003 HC RX 258: Performed by: NURSE PRACTITIONER

## 2021-05-02 PROCEDURE — 6370000000 HC RX 637 (ALT 250 FOR IP): Performed by: NURSE PRACTITIONER

## 2021-05-02 PROCEDURE — 80048 BASIC METABOLIC PNL TOTAL CA: CPT

## 2021-05-02 PROCEDURE — 6370000000 HC RX 637 (ALT 250 FOR IP): Performed by: SURGERY

## 2021-05-02 PROCEDURE — 84100 ASSAY OF PHOSPHORUS: CPT

## 2021-05-02 PROCEDURE — 85025 COMPLETE CBC W/AUTO DIFF WBC: CPT

## 2021-05-02 PROCEDURE — 83735 ASSAY OF MAGNESIUM: CPT

## 2021-05-02 RX ORDER — BISACODYL 10 MG
10 SUPPOSITORY, RECTAL RECTAL DAILY PRN
Status: DISCONTINUED | OUTPATIENT
Start: 2021-05-02 | End: 2021-05-04 | Stop reason: HOSPADM

## 2021-05-02 RX ADMIN — MORPHINE SULFATE 2 MG: 2 INJECTION, SOLUTION INTRAMUSCULAR; INTRAVENOUS at 13:57

## 2021-05-02 RX ADMIN — AMLODIPINE BESYLATE 10 MG: 10 TABLET ORAL at 09:01

## 2021-05-02 RX ADMIN — APIXABAN 5 MG: 5 TABLET, FILM COATED ORAL at 09:01

## 2021-05-02 RX ADMIN — IPRATROPIUM BROMIDE AND ALBUTEROL SULFATE 1 AMPULE: .5; 3 SOLUTION RESPIRATORY (INHALATION) at 11:41

## 2021-05-02 RX ADMIN — POLYETHYLENE GLYCOL 3350 17 G: 17 POWDER, FOR SOLUTION ORAL at 09:00

## 2021-05-02 RX ADMIN — APIXABAN 5 MG: 5 TABLET, FILM COATED ORAL at 20:41

## 2021-05-02 RX ADMIN — METOPROLOL TARTRATE 25 MG: 25 TABLET, FILM COATED ORAL at 09:01

## 2021-05-02 RX ADMIN — METOPROLOL TARTRATE 25 MG: 25 TABLET, FILM COATED ORAL at 20:41

## 2021-05-02 RX ADMIN — IPRATROPIUM BROMIDE AND ALBUTEROL SULFATE 1 AMPULE: .5; 3 SOLUTION RESPIRATORY (INHALATION) at 04:40

## 2021-05-02 RX ADMIN — FAMOTIDINE 20 MG: 10 INJECTION, SOLUTION INTRAVENOUS at 20:41

## 2021-05-02 RX ADMIN — POLYETHYLENE GLYCOL 3350 17 G: 17 POWDER, FOR SOLUTION ORAL at 20:41

## 2021-05-02 RX ADMIN — ARFORMOTEROL TARTRATE 15 MCG: 15 SOLUTION RESPIRATORY (INHALATION) at 19:59

## 2021-05-02 RX ADMIN — DOCUSATE SODIUM 100 MG: 100 CAPSULE, LIQUID FILLED ORAL at 09:01

## 2021-05-02 RX ADMIN — SODIUM CHLORIDE SOLN NEBU 3% 15 ML: 3 NEBU SOLN at 07:44

## 2021-05-02 RX ADMIN — SODIUM CHLORIDE SOLN NEBU 3% 15 ML: 3 NEBU SOLN at 20:03

## 2021-05-02 RX ADMIN — BUDESONIDE 250 MCG: 0.25 SUSPENSION RESPIRATORY (INHALATION) at 07:48

## 2021-05-02 RX ADMIN — IPRATROPIUM BROMIDE AND ALBUTEROL SULFATE 1 AMPULE: .5; 3 SOLUTION RESPIRATORY (INHALATION) at 01:15

## 2021-05-02 RX ADMIN — ARFORMOTEROL TARTRATE 15 MCG: 15 SOLUTION RESPIRATORY (INHALATION) at 07:44

## 2021-05-02 RX ADMIN — IPRATROPIUM BROMIDE AND ALBUTEROL SULFATE 1 AMPULE: .5; 3 SOLUTION RESPIRATORY (INHALATION) at 16:30

## 2021-05-02 RX ADMIN — BUDESONIDE 250 MCG: 0.25 SUSPENSION RESPIRATORY (INHALATION) at 20:02

## 2021-05-02 RX ADMIN — MORPHINE SULFATE 2 MG: 2 INJECTION, SOLUTION INTRAMUSCULAR; INTRAVENOUS at 01:24

## 2021-05-02 RX ADMIN — FAMOTIDINE 20 MG: 10 INJECTION, SOLUTION INTRAVENOUS at 09:01

## 2021-05-02 RX ADMIN — SODIUM CHLORIDE SOLN NEBU 3% 15 ML: 3 NEBU SOLN at 11:43

## 2021-05-02 RX ADMIN — IPRATROPIUM BROMIDE AND ALBUTEROL SULFATE 1 AMPULE: .5; 3 SOLUTION RESPIRATORY (INHALATION) at 07:44

## 2021-05-02 RX ADMIN — IPRATROPIUM BROMIDE AND ALBUTEROL SULFATE 1 AMPULE: .5; 3 SOLUTION RESPIRATORY (INHALATION) at 20:03

## 2021-05-02 RX ADMIN — DEXTROSE AND SODIUM CHLORIDE: 5; 450 INJECTION, SOLUTION INTRAVENOUS at 18:30

## 2021-05-02 RX ADMIN — MORPHINE SULFATE 2 MG: 2 INJECTION, SOLUTION INTRAMUSCULAR; INTRAVENOUS at 08:19

## 2021-05-02 RX ADMIN — PREDNISONE 30 MG: 20 TABLET ORAL at 09:01

## 2021-05-02 RX ADMIN — DOCUSATE SODIUM 100 MG: 100 CAPSULE, LIQUID FILLED ORAL at 20:41

## 2021-05-02 ASSESSMENT — PAIN - FUNCTIONAL ASSESSMENT: PAIN_FUNCTIONAL_ASSESSMENT: PREVENTS OR INTERFERES SOME ACTIVE ACTIVITIES AND ADLS

## 2021-05-02 ASSESSMENT — PAIN DESCRIPTION - FREQUENCY: FREQUENCY: CONTINUOUS

## 2021-05-02 ASSESSMENT — PAIN DESCRIPTION - PROGRESSION
CLINICAL_PROGRESSION: GRADUALLY WORSENING
CLINICAL_PROGRESSION: GRADUALLY IMPROVING

## 2021-05-02 ASSESSMENT — PAIN SCALES - GENERAL
PAINLEVEL_OUTOF10: 7
PAINLEVEL_OUTOF10: 7
PAINLEVEL_OUTOF10: 4
PAINLEVEL_OUTOF10: 5

## 2021-05-02 ASSESSMENT — PAIN DESCRIPTION - ORIENTATION
ORIENTATION: MID;LOWER

## 2021-05-02 ASSESSMENT — PAIN DESCRIPTION - DESCRIPTORS: DESCRIPTORS: ACHING;CONSTANT

## 2021-05-02 ASSESSMENT — PAIN DESCRIPTION - LOCATION
LOCATION: ABDOMEN
LOCATION: ABDOMEN

## 2021-05-02 ASSESSMENT — PAIN DESCRIPTION - ONSET: ONSET: ON-GOING

## 2021-05-02 ASSESSMENT — PAIN DESCRIPTION - PAIN TYPE
TYPE: ACUTE PAIN
TYPE: ACUTE PAIN

## 2021-05-02 NOTE — PLAN OF CARE
Problem: Pain:  Goal: Pain level will decrease  Description: Pain level will decrease  5/1/2021 2347 by Emma Jerez RN  Outcome: Ongoing     Problem: Pain:  Goal: Control of acute pain  Description: Control of acute pain  5/1/2021 2347 by Emma Jerez RN  Outcome: Ongoing     Problem: Pain:  Goal: Control of chronic pain  Description: Control of chronic pain  5/1/2021 2347 by Emma Jerez RN  Outcome: Ongoing     Problem: Pain:  Goal: Patient's pain/discomfort is manageable  Description: Patient's pain/discomfort is manageable  5/1/2021 2347 by Emma Jerez RN  Outcome: Ongoing     Problem: Nutrition  Goal: Optimal nutrition therapy  5/1/2021 2347 by Emma Jerez RN  Outcome: Ongoing     Problem: Infection:  Goal: Will remain free from infection  Description: Will remain free from infection  5/1/2021 2347 by Emma Jerez RN  Outcome: Ongoing     Problem: Safety:  Goal: Free from accidental physical injury  Description: Free from accidental physical injury  5/1/2021 2347 by Emma Jerez RN  Outcome: Ongoing     Problem: Safety:  Goal: Free from intentional harm  Description: Free from intentional harm  5/1/2021 2347 by Emma Jerez RN  Outcome: Ongoing     Problem: Daily Care:  Goal: Daily care needs are met  Description: Daily care needs are met  5/1/2021 2347 by Emma Jerez RN  Outcome: Ongoing     Problem: Skin Integrity:  Goal: Skin integrity will stabilize  Description: Skin integrity will stabilize  5/1/2021 2347 by Emma Jerez RN  Outcome: Ongoing     Problem: Discharge Planning:  Goal: Patients continuum of care needs are met  Description: Patients continuum of care needs are met  5/1/2021 2347 by Emma Jerez RN  Outcome: Ongoing     Problem: Skin Integrity:  Goal: Will show no infection signs and symptoms  Description: Will show no infection signs and symptoms  5/1/2021 2347 by Emma Jerez RN  Outcome: Ongoing     Problem: Skin Integrity:  Goal: Absence of new skin breakdown Description: Absence of new skin breakdown  5/1/2021 2347 by Nelson Walsh RN  Outcome: Ongoing     Problem: Falls - Risk of:  Goal: Will remain free from falls  Description: Will remain free from falls  5/1/2021 2347 by Nelson Walsh RN  Outcome: Ongoing     Problem: Falls - Risk of:  Goal: Absence of physical injury  Description: Absence of physical injury  5/1/2021 2347 by Nelson Walsh RN  Outcome: Ongoing

## 2021-05-02 NOTE — PROGRESS NOTES
Hospitalist Progress Note      PCP: Alyssa Adams MD    Date of Admission: 4/21/2021      Subjective: Patient seen and examined. Feels well today. No complaints. No BM today. Medications:  Reviewed    Infusion Medications    sodium chloride      dextrose 5 % and 0.45 % NaCl 50 mL/hr at 04/30/21 2236     Scheduled Medications    predniSONE  30 mg Oral Daily    metoprolol tartrate  25 mg Oral BID    apixaban  5 mg Oral BID    polyethylene glycol  17 g Oral BID    docusate sodium  100 mg Oral BID    amLODIPine  10 mg Oral Daily    sodium chloride flush  5-40 mL Intravenous 2 times per day    ipratropium-albuterol  1 ampule Inhalation Q4H    famotidine (PEPCID) injection  20 mg Intravenous BID    budesonide  250 mcg Nebulization BID    Arformoterol Tartrate  15 mcg Nebulization BID    sodium chloride (Inhalant)  15 mL Nebulization TID     PRN Meds: bisacodyl, sodium phosphate IVPB **OR** sodium phosphate IVPB, sodium chloride flush, sodium chloride, morphine, phenol, albuterol sulfate HFA, albuterol, promethazine **OR** ondansetron      Intake/Output Summary (Last 24 hours) at 5/2/2021 1253  Last data filed at 5/2/2021 0951  Gross per 24 hour   Intake 3539 ml   Output 1325 ml   Net 2214 ml       Physical Exam Performed:    BP (!) 152/61   Pulse 74   Temp 98.3 °F (36.8 °C) (Oral)   Resp 18   Ht 5' 7\" (1.702 m)   Wt 114 lb 3.2 oz (51.8 kg)   SpO2 99%   BMI 17.89 kg/m²     General appearance: No apparent distress, lying in bed comfortably  Neck: Supple  Respiratory:  Normal respiratory effort. Clear to auscultation, bilaterally without Rales/Wheezes/Rhonchi. Cardiovascular: Regular rate and rhythm with normal S1/S2 without murmurs, rubs or gallops. Abdomen: Soft, tenderness noted, less distended   Musculoskeletal: No clubbing, cyanosis  Skin: Skin color, texture, turgor normal.  No rashes or lesions.   Neurologic:  No focal weakness   Psychiatric: Alert and oriented to person and time, but not place  Capillary Refill: Brisk,3 seconds, normal   Peripheral Pulses: +2 palpable, equal bilaterally       Labs:   Recent Labs     04/30/21  0600 05/01/21 0619 05/02/21 0618   WBC 6.2 7.6 10.8   HGB 9.4* 9.3* 9.9*   HCT 27.7* 28.0* 29.3*    202 221     Recent Labs     04/30/21 0600 05/01/21 0619 05/02/21 0618    140 139   K 3.6 3.4* 4.3    101 101   CO2 35* 35* 35*   BUN 7 9 10   CREATININE 0.6* 0.6* <0.5*   CALCIUM 8.1* 7.8* 8.0*   PHOS 2.4* 2.0* 2.3*     Recent Labs     04/30/21 0600 05/01/21 0619 05/02/21 0618   AST 24 21 19   ALT 64* 57* 54*   BILIDIR <0.2 <0.2 <0.2   BILITOT <0.2 <0.2 0.3   ALKPHOS 85 83 86     No results for input(s): INR in the last 72 hours. No results for input(s): Andrez Sleeper in the last 72 hours. Urinalysis:      Lab Results   Component Value Date    NITRU Negative 04/21/2021    WBCUA 7 04/21/2021    BACTERIA 1+ 06/17/2014    RBCUA 2 04/21/2021    RBCUA 2 11/19/2011    BLOODU TRACE 04/21/2021    SPECGRAV >1.030 04/21/2021    GLUCOSEU Negative 04/21/2021       Radiology:  CT ABDOMEN PELVIS W IV CONTRAST Additional Contrast? Oral   Final Result   1. History of small-bowel obstruction with resolving pattern on current exam.   No discrete transition point with dense stool in a non decompressed colon   suggesting constipation. 2. Chronic biliary dilatation likely physiologic following a cholecystectomy. 3. Left adrenal nodule stable, considered a benign adenoma based upon prior   imaging. 4. AAA stable measuring up to 4.5 cm.   5. Remote compression fractures in the lumbar spine with no acute finding. RECOMMENDATIONS:   4.5 cm abdominal aortic aneurysm. Recommend follow-up every 6 months and   vascular consultation. Reference: J Am Sophie Radiol 5188;43:671-158. XR ABDOMEN (2 VIEWS)   Final Result   Resolving ileus         US ABDOMEN LIMITED   Final Result   Normal sonographic appearance the liver.   Marked extrahepatic biliary dilatation without significant intrahepatic biliary dilatation. Dilatation   bleed related to prior cholecystectomy and patient's age. No acute   abnormality. XR ABDOMEN (KUB) (SINGLE AP VIEW)   Final Result   Nasogastric tube projects to the proximal stomach, normal position. Persistent dilated loops of small bowel in the upper abdomen. XR CHEST PORTABLE   Final Result   No acute cardiac or pulmonary disease. NG tube extends into the stomach. CTA ABDOMEN PELVIS W CONTRAST   Final Result   1. Persisting diffuse small bowel dilatation with fluid-filled lumen   consistent with small bowel obstruction proximal to suggestion of focal   mechanical transition point at the central abdomen/right lower quadrant   possibly related to internal hernia. 2. Redemonstration of infrarenal fusiform abdominal aortic aneurysm with   lumen measuring up to 4.4 cm in greatest diameter, as discussed above. 3. Severe atherosclerotic disease involving the bilateral common, internal   and external iliac arterial vasculature and branches with associated   multifocal high-grade stenosis. 4. Celiac trunk origin focal high-grade stenosis secondary to marked   atherosclerotic disease. 5. Severe sigmoid colon diverticulosis without evidence of diverticulitis. 6. Bilateral lower lung lobe scattered \"tree-in-bud\" opacification pattern   consistent with bronchiolitis/small airway disease with posterior lower lung   lobe multifocal mild consolidation consistent with pneumonia. 7. Cholecystectomy with marked reservoir effect, as discussed above. 8. Splenic chronic granulomatous disease. 9. Mild diffuse distention of esophagus with fluid-filled lumen suggesting   association with gastroesophageal reflux. Recommend clinical correlation. 10. Symmetric bilateral renal cortical volume loss suggesting association   with senescent change versus chronic medical renal disease.    11. Redemonstration of suspected left adrenal gland adenoma. RECOMMENDATIONS:   4.4 cm infrarenal abdominal aortic aneurysm. Recommend follow-up every 12   months and vascular consultation. Reference: J Am [facility] Radiol 6433;81:330-128. CT CHEST WO CONTRAST   Final Result   1. Persisting diffuse small bowel dilatation with fluid-filled lumen   consistent with small bowel obstruction proximal to suggestion of focal   mechanical transition point at the central abdomen/right lower quadrant   possibly related to internal hernia. 2. Redemonstration of infrarenal fusiform abdominal aortic aneurysm with   lumen measuring up to 4.4 cm in greatest diameter, as discussed above. 3. Severe atherosclerotic disease involving the bilateral common, internal   and external iliac arterial vasculature and branches with associated   multifocal high-grade stenosis. 4. Celiac trunk origin focal high-grade stenosis secondary to marked   atherosclerotic disease. 5. Severe sigmoid colon diverticulosis without evidence of diverticulitis. 6. Bilateral lower lung lobe scattered \"tree-in-bud\" opacification pattern   consistent with bronchiolitis/small airway disease with posterior lower lung   lobe multifocal mild consolidation consistent with pneumonia. 7. Cholecystectomy with marked reservoir effect, as discussed above. 8. Splenic chronic granulomatous disease. 9. Mild diffuse distention of esophagus with fluid-filled lumen suggesting   association with gastroesophageal reflux. Recommend clinical correlation. 10. Symmetric bilateral renal cortical volume loss suggesting association   with senescent change versus chronic medical renal disease. 11. Redemonstration of suspected left adrenal gland adenoma. RECOMMENDATIONS:   4.4 cm infrarenal abdominal aortic aneurysm. Recommend follow-up every 12   months and vascular consultation. Reference: J Am Sophie Radiol 0652;51:349-953.                  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 Sepsis, likely due to MRSA pneumonia, IV antibiotics started patient on cefepime Flagyl and vancomycin, changed to Vanc and levaquin, pulmo consulted. transferred out of ICU. Completed Abx course 4/28. 2. SBO, iv fluids, GS consulted. Had BM yesterday. Upgrade to low fiber diet. Diet advancement per GS. CT Ab/Pelvis with contrast showed resolving SBP, dense stool. Continue bowel regimen. Added dulcolax suppository PRN. 3. MRSA Pneumonia, POA, cefepime vancomycin and Flagyl changed to Levaquin. Completed course 4/28. 4. Acute on chronic respiratory failure with hypercarbia, POA, due to COPD exacerbation and potentially mucus plugging, not very compliant with BIPAP, Pulmo consulted.   5. Severe COPD, with acute exacerbation, DuoNeb, on IV steroids, improved. Will continue steroid taper. 6. 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 No further interventions planned at this time. 7. Acute metabolic encephalopathy, multifactorial. Improving.   8. Elevated LFTs, ?sepsis related, improving, US noted. 9. Hypernatremia, D 5 started.  improved. 10. Uncontrolled HTN, on iv metoprolol and po amlodipine, will increase amlodipine. 11. AAA - needs follow-up with vascular surgery and imaging too. Will make referral at discharge.       Advance diet as tolerated    Diet: DIET

## 2021-05-03 LAB
ALBUMIN SERPL-MCNC: 3.4 G/DL (ref 3.4–5)
ALP BLD-CCNC: 78 U/L (ref 40–129)
ALT SERPL-CCNC: 40 U/L (ref 10–40)
ANION GAP SERPL CALCULATED.3IONS-SCNC: 5 MMOL/L (ref 3–16)
AST SERPL-CCNC: 14 U/L (ref 15–37)
BASOPHILS ABSOLUTE: 0 K/UL (ref 0–0.2)
BASOPHILS RELATIVE PERCENT: 0.2 %
BILIRUB SERPL-MCNC: <0.2 MG/DL (ref 0–1)
BILIRUBIN DIRECT: <0.2 MG/DL (ref 0–0.3)
BILIRUBIN, INDIRECT: ABNORMAL MG/DL (ref 0–1)
BUN BLDV-MCNC: 12 MG/DL (ref 7–20)
CALCIUM SERPL-MCNC: 7.9 MG/DL (ref 8.3–10.6)
CHLORIDE BLD-SCNC: 101 MMOL/L (ref 99–110)
CO2: 35 MMOL/L (ref 21–32)
CREAT SERPL-MCNC: 0.6 MG/DL (ref 0.8–1.3)
EOSINOPHILS ABSOLUTE: 0.1 K/UL (ref 0–0.6)
EOSINOPHILS RELATIVE PERCENT: 1 %
GFR AFRICAN AMERICAN: >60
GFR NON-AFRICAN AMERICAN: >60
GLUCOSE BLD-MCNC: 80 MG/DL (ref 70–99)
HCT VFR BLD CALC: 29.4 % (ref 40.5–52.5)
HEMOGLOBIN: 9.8 G/DL (ref 13.5–17.5)
LYMPHOCYTES ABSOLUTE: 1.3 K/UL (ref 1–5.1)
LYMPHOCYTES RELATIVE PERCENT: 13.4 %
MAGNESIUM: 1.9 MG/DL (ref 1.8–2.4)
MCH RBC QN AUTO: 33.2 PG (ref 26–34)
MCHC RBC AUTO-ENTMCNC: 33.2 G/DL (ref 31–36)
MCV RBC AUTO: 99.9 FL (ref 80–100)
MONOCYTES ABSOLUTE: 0.9 K/UL (ref 0–1.3)
MONOCYTES RELATIVE PERCENT: 9.2 %
NEUTROPHILS ABSOLUTE: 7.4 K/UL (ref 1.7–7.7)
NEUTROPHILS RELATIVE PERCENT: 76.2 %
PDW BLD-RTO: 13.7 % (ref 12.4–15.4)
PHOSPHORUS: 2.1 MG/DL (ref 2.5–4.9)
PLATELET # BLD: 226 K/UL (ref 135–450)
PMV BLD AUTO: 7.5 FL (ref 5–10.5)
POTASSIUM SERPL-SCNC: 3.4 MMOL/L (ref 3.5–5.1)
RBC # BLD: 2.94 M/UL (ref 4.2–5.9)
SODIUM BLD-SCNC: 141 MMOL/L (ref 136–145)
TOTAL PROTEIN: 5 G/DL (ref 6.4–8.2)
WBC # BLD: 9.7 K/UL (ref 4–11)

## 2021-05-03 PROCEDURE — 84100 ASSAY OF PHOSPHORUS: CPT

## 2021-05-03 PROCEDURE — 6360000002 HC RX W HCPCS: Performed by: INTERNAL MEDICINE

## 2021-05-03 PROCEDURE — APPNB30 APP NON BILLABLE TIME 0-30 MINS: Performed by: PHYSICIAN ASSISTANT

## 2021-05-03 PROCEDURE — 6370000000 HC RX 637 (ALT 250 FOR IP): Performed by: SURGERY

## 2021-05-03 PROCEDURE — 2580000003 HC RX 258: Performed by: INTERNAL MEDICINE

## 2021-05-03 PROCEDURE — 2500000003 HC RX 250 WO HCPCS: Performed by: NURSE PRACTITIONER

## 2021-05-03 PROCEDURE — 85025 COMPLETE CBC W/AUTO DIFF WBC: CPT

## 2021-05-03 PROCEDURE — 80048 BASIC METABOLIC PNL TOTAL CA: CPT

## 2021-05-03 PROCEDURE — 6370000000 HC RX 637 (ALT 250 FOR IP): Performed by: INTERNAL MEDICINE

## 2021-05-03 PROCEDURE — 99232 SBSQ HOSP IP/OBS MODERATE 35: CPT | Performed by: SURGERY

## 2021-05-03 PROCEDURE — 83735 ASSAY OF MAGNESIUM: CPT

## 2021-05-03 PROCEDURE — 6360000002 HC RX W HCPCS: Performed by: NURSE PRACTITIONER

## 2021-05-03 PROCEDURE — 6370000000 HC RX 637 (ALT 250 FOR IP): Performed by: NURSE PRACTITIONER

## 2021-05-03 PROCEDURE — 94761 N-INVAS EAR/PLS OXIMETRY MLT: CPT

## 2021-05-03 PROCEDURE — APPSS15 APP SPLIT SHARED TIME 0-15 MINUTES: Performed by: PHYSICIAN ASSISTANT

## 2021-05-03 PROCEDURE — 2700000000 HC OXYGEN THERAPY PER DAY

## 2021-05-03 PROCEDURE — 80076 HEPATIC FUNCTION PANEL: CPT

## 2021-05-03 PROCEDURE — 2060000000 HC ICU INTERMEDIATE R&B

## 2021-05-03 PROCEDURE — 94640 AIRWAY INHALATION TREATMENT: CPT

## 2021-05-03 RX ORDER — PREDNISONE 20 MG/1
20 TABLET ORAL DAILY
Status: DISCONTINUED | OUTPATIENT
Start: 2021-05-04 | End: 2021-05-03

## 2021-05-03 RX ORDER — IPRATROPIUM BROMIDE AND ALBUTEROL SULFATE 2.5; .5 MG/3ML; MG/3ML
1 SOLUTION RESPIRATORY (INHALATION) EVERY 4 HOURS PRN
Status: DISCONTINUED | OUTPATIENT
Start: 2021-05-03 | End: 2021-05-04 | Stop reason: HOSPADM

## 2021-05-03 RX ORDER — SENNA PLUS 8.6 MG/1
1 TABLET ORAL NIGHTLY
Status: DISCONTINUED | OUTPATIENT
Start: 2021-05-03 | End: 2021-05-04 | Stop reason: HOSPADM

## 2021-05-03 RX ORDER — POTASSIUM CHLORIDE 20 MEQ/1
40 TABLET, EXTENDED RELEASE ORAL ONCE
Status: COMPLETED | OUTPATIENT
Start: 2021-05-03 | End: 2021-05-03

## 2021-05-03 RX ORDER — PREDNISONE 20 MG/1
20 TABLET ORAL DAILY
Status: DISCONTINUED | OUTPATIENT
Start: 2021-05-04 | End: 2021-05-04 | Stop reason: HOSPADM

## 2021-05-03 RX ADMIN — POLYETHYLENE GLYCOL 3350 17 G: 17 POWDER, FOR SOLUTION ORAL at 09:09

## 2021-05-03 RX ADMIN — METOPROLOL TARTRATE 25 MG: 25 TABLET, FILM COATED ORAL at 20:44

## 2021-05-03 RX ADMIN — PREDNISONE 30 MG: 20 TABLET ORAL at 09:09

## 2021-05-03 RX ADMIN — AMLODIPINE BESYLATE 10 MG: 10 TABLET ORAL at 09:09

## 2021-05-03 RX ADMIN — IPRATROPIUM BROMIDE AND ALBUTEROL SULFATE 1 AMPULE: .5; 3 SOLUTION RESPIRATORY (INHALATION) at 07:25

## 2021-05-03 RX ADMIN — APIXABAN 5 MG: 5 TABLET, FILM COATED ORAL at 09:09

## 2021-05-03 RX ADMIN — POTASSIUM CHLORIDE 40 MEQ: 1500 TABLET, EXTENDED RELEASE ORAL at 10:09

## 2021-05-03 RX ADMIN — FAMOTIDINE 20 MG: 10 INJECTION, SOLUTION INTRAVENOUS at 20:44

## 2021-05-03 RX ADMIN — IPRATROPIUM BROMIDE AND ALBUTEROL SULFATE 1 AMPULE: .5; 3 SOLUTION RESPIRATORY (INHALATION) at 04:05

## 2021-05-03 RX ADMIN — MORPHINE SULFATE 2 MG: 2 INJECTION, SOLUTION INTRAMUSCULAR; INTRAVENOUS at 20:51

## 2021-05-03 RX ADMIN — BUDESONIDE 250 MCG: 0.25 SUSPENSION RESPIRATORY (INHALATION) at 07:26

## 2021-05-03 RX ADMIN — DOCUSATE SODIUM 100 MG: 100 CAPSULE, LIQUID FILLED ORAL at 09:09

## 2021-05-03 RX ADMIN — SENNOSIDES 8.6 MG: 8.6 TABLET, FILM COATED ORAL at 20:44

## 2021-05-03 RX ADMIN — METOPROLOL TARTRATE 25 MG: 25 TABLET, FILM COATED ORAL at 09:09

## 2021-05-03 RX ADMIN — FAMOTIDINE 20 MG: 10 INJECTION, SOLUTION INTRAVENOUS at 09:03

## 2021-05-03 RX ADMIN — ARFORMOTEROL TARTRATE 15 MCG: 15 SOLUTION RESPIRATORY (INHALATION) at 07:26

## 2021-05-03 RX ADMIN — BUDESONIDE 250 MCG: 0.25 SUSPENSION RESPIRATORY (INHALATION) at 19:50

## 2021-05-03 RX ADMIN — SODIUM CHLORIDE, PRESERVATIVE FREE 10 ML: 5 INJECTION INTRAVENOUS at 09:02

## 2021-05-03 RX ADMIN — SODIUM CHLORIDE SOLN NEBU 3% 15 ML: 3 NEBU SOLN at 11:38

## 2021-05-03 RX ADMIN — SODIUM CHLORIDE SOLN NEBU 3% 15 ML: 3 NEBU SOLN at 19:50

## 2021-05-03 RX ADMIN — MORPHINE SULFATE 2 MG: 2 INJECTION, SOLUTION INTRAMUSCULAR; INTRAVENOUS at 09:02

## 2021-05-03 RX ADMIN — MORPHINE SULFATE 2 MG: 2 INJECTION, SOLUTION INTRAMUSCULAR; INTRAVENOUS at 04:19

## 2021-05-03 RX ADMIN — ARFORMOTEROL TARTRATE 15 MCG: 15 SOLUTION RESPIRATORY (INHALATION) at 19:50

## 2021-05-03 RX ADMIN — APIXABAN 5 MG: 5 TABLET, FILM COATED ORAL at 20:44

## 2021-05-03 RX ADMIN — DOCUSATE SODIUM 100 MG: 100 CAPSULE, LIQUID FILLED ORAL at 20:44

## 2021-05-03 RX ADMIN — SODIUM CHLORIDE SOLN NEBU 3% 15 ML: 3 NEBU SOLN at 07:26

## 2021-05-03 RX ADMIN — DEXTROSE AND SODIUM CHLORIDE: 5; 450 INJECTION, SOLUTION INTRAVENOUS at 14:49

## 2021-05-03 RX ADMIN — MORPHINE SULFATE 2 MG: 2 INJECTION, SOLUTION INTRAMUSCULAR; INTRAVENOUS at 14:45

## 2021-05-03 ASSESSMENT — PAIN DESCRIPTION - PAIN TYPE
TYPE: ACUTE PAIN
TYPE: ACUTE PAIN

## 2021-05-03 ASSESSMENT — PAIN SCALES - GENERAL
PAINLEVEL_OUTOF10: 6
PAINLEVEL_OUTOF10: 3
PAINLEVEL_OUTOF10: 7
PAINLEVEL_OUTOF10: 0

## 2021-05-03 ASSESSMENT — PAIN DESCRIPTION - ORIENTATION
ORIENTATION: MID

## 2021-05-03 ASSESSMENT — PAIN - FUNCTIONAL ASSESSMENT
PAIN_FUNCTIONAL_ASSESSMENT: PREVENTS OR INTERFERES SOME ACTIVE ACTIVITIES AND ADLS
PAIN_FUNCTIONAL_ASSESSMENT: PREVENTS OR INTERFERES SOME ACTIVE ACTIVITIES AND ADLS

## 2021-05-03 ASSESSMENT — PAIN DESCRIPTION - ONSET
ONSET: ON-GOING
ONSET: PROGRESSIVE

## 2021-05-03 ASSESSMENT — PAIN DESCRIPTION - PROGRESSION
CLINICAL_PROGRESSION: GRADUALLY WORSENING

## 2021-05-03 ASSESSMENT — PAIN DESCRIPTION - DESCRIPTORS
DESCRIPTORS: ACHING

## 2021-05-03 NOTE — PROGRESS NOTES
Hospitalist Progress Note      PCP: Alyssa Adams MD    Date of Admission: 4/21/2021      Subjective: Patient seen and examined. Had a rough night with significant abdominal pain. Did have BM yesterday. Medications:  Reviewed    Infusion Medications    sodium chloride      dextrose 5 % and 0.45 % NaCl 50 mL/hr at 05/02/21 1830     Scheduled Medications    potassium chloride  40 mEq Oral Once    predniSONE  30 mg Oral Daily    metoprolol tartrate  25 mg Oral BID    apixaban  5 mg Oral BID    polyethylene glycol  17 g Oral BID    docusate sodium  100 mg Oral BID    amLODIPine  10 mg Oral Daily    sodium chloride flush  5-40 mL Intravenous 2 times per day    famotidine (PEPCID) injection  20 mg Intravenous BID    budesonide  250 mcg Nebulization BID    Arformoterol Tartrate  15 mcg Nebulization BID    sodium chloride (Inhalant)  15 mL Nebulization TID     PRN Meds: ipratropium-albuterol, bisacodyl, sodium phosphate IVPB **OR** sodium phosphate IVPB, sodium chloride flush, sodium chloride, morphine, phenol, albuterol sulfate HFA, albuterol, promethazine **OR** ondansetron      Intake/Output Summary (Last 24 hours) at 5/3/2021 0928  Last data filed at 5/3/2021 0739  Gross per 24 hour   Intake 1111 ml   Output 1325 ml   Net -214 ml       Physical Exam Performed:    BP (!) 157/74   Pulse 70   Temp 98.2 °F (36.8 °C) (Oral)   Resp 16   Ht 5' 7\" (1.702 m)   Wt 113 lb 5.1 oz (51.4 kg)   SpO2 100%   BMI 17.75 kg/m²     General appearance: No apparent distress, lying in bed comfortably  Neck: Supple  Respiratory:  Normal respiratory effort. Clear to auscultation, bilaterally without Rales/Wheezes/Rhonchi. Cardiovascular: Regular rate and rhythm with normal S1/S2 without murmurs, rubs or gallops. Abdomen: Soft, no TTP, non-distended  Musculoskeletal: No clubbing, cyanosis  Skin: Skin color, texture, turgor normal.  No rashes or lesions.   Neurologic:  No focal weakness   Psychiatric: Alert and oriented to person and time, but not place  Capillary Refill: Brisk,3 seconds, normal   Peripheral Pulses: +2 palpable, equal bilaterally       Labs:   Recent Labs     05/01/21 0619 05/02/21  0618 05/03/21  0555   WBC 7.6 10.8 9.7   HGB 9.3* 9.9* 9.8*   HCT 28.0* 29.3* 29.4*    221 226     Recent Labs     05/01/21 0619 05/02/21 0618 05/03/21  0555    139 141   K 3.4* 4.3 3.4*    101 101   CO2 35* 35* 35*   BUN 9 10 12   CREATININE 0.6* <0.5* 0.6*   CALCIUM 7.8* 8.0* 7.9*   PHOS 2.0* 2.3* 2.1*     Recent Labs     05/01/21 0619 05/02/21 0618 05/03/21  0555   AST 21 19 14*   ALT 57* 54* 40   BILIDIR <0.2 <0.2 <0.2   BILITOT <0.2 0.3 <0.2   ALKPHOS 83 86 78     No results for input(s): INR in the last 72 hours. No results for input(s): Todd Hilario in the last 72 hours. Urinalysis:      Lab Results   Component Value Date    NITRU Negative 04/21/2021    WBCUA 7 04/21/2021    BACTERIA 1+ 06/17/2014    RBCUA 2 04/21/2021    RBCUA 2 11/19/2011    BLOODU TRACE 04/21/2021    SPECGRAV >1.030 04/21/2021    GLUCOSEU Negative 04/21/2021       Radiology:  CT ABDOMEN PELVIS W IV CONTRAST Additional Contrast? Oral   Final Result   1. History of small-bowel obstruction with resolving pattern on current exam.   No discrete transition point with dense stool in a non decompressed colon   suggesting constipation. 2. Chronic biliary dilatation likely physiologic following a cholecystectomy. 3. Left adrenal nodule stable, considered a benign adenoma based upon prior   imaging. 4. AAA stable measuring up to 4.5 cm.   5. Remote compression fractures in the lumbar spine with no acute finding. RECOMMENDATIONS:   4.5 cm abdominal aortic aneurysm. Recommend follow-up every 6 months and   vascular consultation. Reference: J Am [facility] Radiol 6381;58:871-485. XR ABDOMEN (2 VIEWS)   Final Result   Resolving ileus         US ABDOMEN LIMITED   Final Result   Normal sonographic appearance the liver. Marked extrahepatic biliary   dilatation without significant intrahepatic biliary dilatation. Dilatation   bleed related to prior cholecystectomy and patient's age. No acute   abnormality. XR ABDOMEN (KUB) (SINGLE AP VIEW)   Final Result   Nasogastric tube projects to the proximal stomach, normal position. Persistent dilated loops of small bowel in the upper abdomen. XR CHEST PORTABLE   Final Result   No acute cardiac or pulmonary disease. NG tube extends into the stomach. CTA ABDOMEN PELVIS W CONTRAST   Final Result   1. Persisting diffuse small bowel dilatation with fluid-filled lumen   consistent with small bowel obstruction proximal to suggestion of focal   mechanical transition point at the central abdomen/right lower quadrant   possibly related to internal hernia. 2. Redemonstration of infrarenal fusiform abdominal aortic aneurysm with   lumen measuring up to 4.4 cm in greatest diameter, as discussed above. 3. Severe atherosclerotic disease involving the bilateral common, internal   and external iliac arterial vasculature and branches with associated   multifocal high-grade stenosis. 4. Celiac trunk origin focal high-grade stenosis secondary to marked   atherosclerotic disease. 5. Severe sigmoid colon diverticulosis without evidence of diverticulitis. 6. Bilateral lower lung lobe scattered \"tree-in-bud\" opacification pattern   consistent with bronchiolitis/small airway disease with posterior lower lung   lobe multifocal mild consolidation consistent with pneumonia. 7. Cholecystectomy with marked reservoir effect, as discussed above. 8. Splenic chronic granulomatous disease. 9. Mild diffuse distention of esophagus with fluid-filled lumen suggesting   association with gastroesophageal reflux. Recommend clinical correlation. 10. Symmetric bilateral renal cortical volume loss suggesting association   with senescent change versus chronic medical renal disease. 11. Redemonstration of suspected left adrenal gland adenoma. RECOMMENDATIONS:   4.4 cm infrarenal abdominal aortic aneurysm. Recommend follow-up every 12   months and vascular consultation. Reference: J Am Sophie Radiol 0986;23:833-579. CT CHEST WO CONTRAST   Final Result   1. Persisting diffuse small bowel dilatation with fluid-filled lumen   consistent with small bowel obstruction proximal to suggestion of focal   mechanical transition point at the central abdomen/right lower quadrant   possibly related to internal hernia. 2. Redemonstration of infrarenal fusiform abdominal aortic aneurysm with   lumen measuring up to 4.4 cm in greatest diameter, as discussed above. 3. Severe atherosclerotic disease involving the bilateral common, internal   and external iliac arterial vasculature and branches with associated   multifocal high-grade stenosis. 4. Celiac trunk origin focal high-grade stenosis secondary to marked   atherosclerotic disease. 5. Severe sigmoid colon diverticulosis without evidence of diverticulitis. 6. Bilateral lower lung lobe scattered \"tree-in-bud\" opacification pattern   consistent with bronchiolitis/small airway disease with posterior lower lung   lobe multifocal mild consolidation consistent with pneumonia. 7. Cholecystectomy with marked reservoir effect, as discussed above. 8. Splenic chronic granulomatous disease. 9. Mild diffuse distention of esophagus with fluid-filled lumen suggesting   association with gastroesophageal reflux. Recommend clinical correlation. 10. Symmetric bilateral renal cortical volume loss suggesting association   with senescent change versus chronic medical renal disease. 11. Redemonstration of suspected left adrenal gland adenoma. RECOMMENDATIONS:   4.4 cm infrarenal abdominal aortic aneurysm. Recommend follow-up every 12   months and vascular consultation. Reference: J Am Sophie Radiol 9685;67:443-339. discharge. Advance diet as tolerated    Diet: DIET LOW FIBER;   Dietary Nutrition Supplements: Frozen Oral Supplement  Code Status: Full Code    Disposition-likely discharge tomorrow    Annette Cordoba MD

## 2021-05-03 NOTE — PROGRESS NOTES
Tolerating diet.  + BM. Still with minimal lower abdominal pain with coughing. Abd soft, NT, ND    WBC 9.7  Cr 0.6    A/P: 77 yo male with severe COPD, LL pneumonia, resolving SBO    Continue low fiber diet.   Continue bowel regimen    Completed course of antibiotics for pneumonia    Continue steroid taper for COPD per hospitalist    OK for discharge tomorrow from surgical standpoint    Li Gonzales MD

## 2021-05-03 NOTE — PLAN OF CARE
Problem: Pain:  Description: Pain management should include both nonpharmacologic and pharmacologic interventions.   Goal: Pain level will decrease  Description: Pain level will decrease  5/3/2021 1406 by Jean Claude Lynch RN  Outcome: Ongoing    Goal: Control of acute pain  Description: Control of acute pain  5/3/2021 1406 by Jean Claude Lynch RN  Outcome: Ongoing     Problem: Nutrition  Goal: Optimal nutrition therapy  5/3/2021 1406 by Jean Claude Lynch RN  Outcome: Ongoing     Problem: Infection:  Goal: Will remain free from infection  Description: Will remain free from infection  5/3/2021 1406 by Jean Claude Lynch RN  Outcome: Ongoing     Problem: Safety:  Goal: Free from accidental physical injury  Description: Free from accidental physical injury  5/3/2021 1406 by Jean Claude Lynch RN  Outcome: Ongoing     Problem: Daily Care:  Goal: Daily care needs are met  Description: Daily care needs are met  5/3/2021 1406 by Jean Claude Lynch RN  Outcome: Ongoing     Problem: Skin Integrity:  Goal: Skin integrity will stabilize  Description: Skin integrity will stabilize  5/3/2021 1406 by Jean Claude Lynch RN  Outcome: Ongoing     Problem: Skin Integrity:  Goal: Will show no infection signs and symptoms  Description: Will show no infection signs and symptoms  5/3/2021 1406 by Jean Claude Lynch RN  Outcome: Ongoing    Goal: Absence of new skin breakdown  Description: Absence of new skin breakdown  5/3/2021 1406 by Jean Claude Lynch RN  Outcome: Ongoing     Problem: Falls - Risk of:  Goal: Will remain free from falls  Description: Will remain free from falls  5/3/2021 1406 by Jean Claude Lynch RN  Outcome: Ongoing    Goal: Absence of physical injury  Description: Absence of physical injury  5/3/2021 1406 by Jean Claude Lynch RN  Outcome: Ongoing

## 2021-05-04 VITALS
RESPIRATION RATE: 18 BRPM | BODY MASS INDEX: 17.27 KG/M2 | DIASTOLIC BLOOD PRESSURE: 63 MMHG | TEMPERATURE: 98.3 F | SYSTOLIC BLOOD PRESSURE: 150 MMHG | WEIGHT: 110.01 LBS | HEART RATE: 68 BPM | HEIGHT: 67 IN | OXYGEN SATURATION: 99 %

## 2021-05-04 LAB
ALBUMIN SERPL-MCNC: 3.3 G/DL (ref 3.4–5)
ALP BLD-CCNC: 90 U/L (ref 40–129)
ALT SERPL-CCNC: 41 U/L (ref 10–40)
ANION GAP SERPL CALCULATED.3IONS-SCNC: 4 MMOL/L (ref 3–16)
AST SERPL-CCNC: 15 U/L (ref 15–37)
BASOPHILS ABSOLUTE: 0 K/UL (ref 0–0.2)
BASOPHILS RELATIVE PERCENT: 0.4 %
BILIRUB SERPL-MCNC: 0.3 MG/DL (ref 0–1)
BILIRUBIN DIRECT: <0.2 MG/DL (ref 0–0.3)
BILIRUBIN, INDIRECT: ABNORMAL MG/DL (ref 0–1)
BUN BLDV-MCNC: 12 MG/DL (ref 7–20)
CALCIUM SERPL-MCNC: 8.3 MG/DL (ref 8.3–10.6)
CHLORIDE BLD-SCNC: 99 MMOL/L (ref 99–110)
CO2: 36 MMOL/L (ref 21–32)
CREAT SERPL-MCNC: 0.5 MG/DL (ref 0.8–1.3)
EOSINOPHILS ABSOLUTE: 0 K/UL (ref 0–0.6)
EOSINOPHILS RELATIVE PERCENT: 0.3 %
GFR AFRICAN AMERICAN: >60
GFR NON-AFRICAN AMERICAN: >60
GLUCOSE BLD-MCNC: 105 MG/DL (ref 70–99)
HCT VFR BLD CALC: 30.8 % (ref 40.5–52.5)
HEMOGLOBIN: 10.1 G/DL (ref 13.5–17.5)
LYMPHOCYTES ABSOLUTE: 0.9 K/UL (ref 1–5.1)
LYMPHOCYTES RELATIVE PERCENT: 8.9 %
MAGNESIUM: 2.2 MG/DL (ref 1.8–2.4)
MCH RBC QN AUTO: 33.1 PG (ref 26–34)
MCHC RBC AUTO-ENTMCNC: 32.9 G/DL (ref 31–36)
MCV RBC AUTO: 100.7 FL (ref 80–100)
MONOCYTES ABSOLUTE: 0.9 K/UL (ref 0–1.3)
MONOCYTES RELATIVE PERCENT: 8.6 %
NEUTROPHILS ABSOLUTE: 8.3 K/UL (ref 1.7–7.7)
NEUTROPHILS RELATIVE PERCENT: 81.8 %
PDW BLD-RTO: 13.6 % (ref 12.4–15.4)
PHOSPHORUS: 2.6 MG/DL (ref 2.5–4.9)
PLATELET # BLD: 247 K/UL (ref 135–450)
PMV BLD AUTO: 7.4 FL (ref 5–10.5)
POTASSIUM SERPL-SCNC: 4 MMOL/L (ref 3.5–5.1)
RBC # BLD: 3.05 M/UL (ref 4.2–5.9)
SODIUM BLD-SCNC: 139 MMOL/L (ref 136–145)
TOTAL PROTEIN: 5.3 G/DL (ref 6.4–8.2)
WBC # BLD: 10.1 K/UL (ref 4–11)

## 2021-05-04 PROCEDURE — APPSS15 APP SPLIT SHARED TIME 0-15 MINUTES: Performed by: NURSE PRACTITIONER

## 2021-05-04 PROCEDURE — 94640 AIRWAY INHALATION TREATMENT: CPT

## 2021-05-04 PROCEDURE — 99231 SBSQ HOSP IP/OBS SF/LOW 25: CPT | Performed by: SURGERY

## 2021-05-04 PROCEDURE — 6370000000 HC RX 637 (ALT 250 FOR IP): Performed by: INTERNAL MEDICINE

## 2021-05-04 PROCEDURE — 80048 BASIC METABOLIC PNL TOTAL CA: CPT

## 2021-05-04 PROCEDURE — 6360000002 HC RX W HCPCS: Performed by: INTERNAL MEDICINE

## 2021-05-04 PROCEDURE — 84100 ASSAY OF PHOSPHORUS: CPT

## 2021-05-04 PROCEDURE — 94761 N-INVAS EAR/PLS OXIMETRY MLT: CPT

## 2021-05-04 PROCEDURE — 2700000000 HC OXYGEN THERAPY PER DAY

## 2021-05-04 PROCEDURE — 2500000003 HC RX 250 WO HCPCS: Performed by: NURSE PRACTITIONER

## 2021-05-04 PROCEDURE — 85025 COMPLETE CBC W/AUTO DIFF WBC: CPT

## 2021-05-04 PROCEDURE — APPNB15 APP NON BILLABLE TIME 0-15 MINS: Performed by: NURSE PRACTITIONER

## 2021-05-04 PROCEDURE — 80076 HEPATIC FUNCTION PANEL: CPT

## 2021-05-04 PROCEDURE — 83735 ASSAY OF MAGNESIUM: CPT

## 2021-05-04 PROCEDURE — 2580000003 HC RX 258: Performed by: INTERNAL MEDICINE

## 2021-05-04 PROCEDURE — 6360000002 HC RX W HCPCS: Performed by: NURSE PRACTITIONER

## 2021-05-04 RX ORDER — PREDNISONE 20 MG/1
20 TABLET ORAL DAILY
Qty: 5 TABLET | Refills: 0 | Status: SHIPPED | OUTPATIENT
Start: 2021-05-05 | End: 2021-05-10

## 2021-05-04 RX ADMIN — METOPROLOL TARTRATE 25 MG: 25 TABLET, FILM COATED ORAL at 09:15

## 2021-05-04 RX ADMIN — MORPHINE SULFATE 2 MG: 2 INJECTION, SOLUTION INTRAMUSCULAR; INTRAVENOUS at 04:27

## 2021-05-04 RX ADMIN — FAMOTIDINE 20 MG: 10 INJECTION, SOLUTION INTRAVENOUS at 09:15

## 2021-05-04 RX ADMIN — SODIUM CHLORIDE SOLN NEBU 3% 15 ML: 3 NEBU SOLN at 07:50

## 2021-05-04 RX ADMIN — ARFORMOTEROL TARTRATE 15 MCG: 15 SOLUTION RESPIRATORY (INHALATION) at 08:00

## 2021-05-04 RX ADMIN — MORPHINE SULFATE 2 MG: 2 INJECTION, SOLUTION INTRAMUSCULAR; INTRAVENOUS at 09:17

## 2021-05-04 RX ADMIN — AMLODIPINE BESYLATE 10 MG: 10 TABLET ORAL at 09:15

## 2021-05-04 RX ADMIN — SODIUM CHLORIDE, PRESERVATIVE FREE 10 ML: 5 INJECTION INTRAVENOUS at 09:15

## 2021-05-04 RX ADMIN — APIXABAN 5 MG: 5 TABLET, FILM COATED ORAL at 09:15

## 2021-05-04 RX ADMIN — BUDESONIDE 250 MCG: 0.25 SUSPENSION RESPIRATORY (INHALATION) at 07:45

## 2021-05-04 RX ADMIN — PREDNISONE 20 MG: 20 TABLET ORAL at 09:15

## 2021-05-04 ASSESSMENT — PAIN SCALES - GENERAL
PAINLEVEL_OUTOF10: 0
PAINLEVEL_OUTOF10: 6
PAINLEVEL_OUTOF10: 7

## 2021-05-04 ASSESSMENT — PAIN DESCRIPTION - DESCRIPTORS: DESCRIPTORS: ACHING

## 2021-05-04 ASSESSMENT — PAIN DESCRIPTION - FREQUENCY: FREQUENCY: CONTINUOUS

## 2021-05-04 ASSESSMENT — PAIN - FUNCTIONAL ASSESSMENT: PAIN_FUNCTIONAL_ASSESSMENT: PREVENTS OR INTERFERES SOME ACTIVE ACTIVITIES AND ADLS

## 2021-05-04 ASSESSMENT — PAIN DESCRIPTION - PAIN TYPE: TYPE: ACUTE PAIN
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
## 2021-05-04 NOTE — PROGRESS NOTES
[facility] [phone]                                     Daily Progress Note                                                         Pt Name: Dayday De Leon  Medical Record Number: [id number]  Date of Birth 1945   Today's Date: 2021  CC: SBO vs ileus    ASSESSMENT/PLAN  75 yo male with severe COPD, bilateral LL pneumonia, SBO vs ileus     -SBO vs ileus - resolving  -Reports improvement in pain. +flatulence and had two BMs this AM.    -continue bowel regimen at discharge, diet as tolerated  -no plans for surgical intervention  -OK for discharge from surgical team standpoint    Stone Vivar denies abdominal pain. Denies nausea. Tolerating PO. Passing flatus, and had two BMs this morning. OBJECTIVE  VITALS:  height is 5' 7\" (1.702 m) and weight is 110 lb 0.2 oz (49.9 kg). His oral temperature is 98.2 °F (36.8 °C). His blood pressure is 143/64 (abnormal) and his pulse is 78. His respiration is 18 and oxygen saturation is 99%. INTAKE/OUTPUT:      Intake/Output Summary (Last 24 hours) at 2021 1012  Last data filed at 2021 3695  Gross per 24 hour   Intake 0 ml   Output 1945 ml   Net -1945 ml     GENERAL: alert, cooperative, no distress  LUNGS: normal respiratory effort, no accessory muscle use  HEART: normal rate and regular rhythm  ABDOMEN:  Soft, NT, ND  EXTREMITY: no cyanosis, clubbing or edema    I/O last 3 completed shifts: In: 0   Out:  [Urine:]      LABS  Recent Labs     21  0424   WBC 10.1   HGB 10.1*   HCT 30.8*         K 4.0   CL 99   CO2 36*   BUN 12   CREATININE 0.5*   MG 2.20   PHOS 2.6   CALCIUM 8.3   AST 15   ALT 41*   BILITOT 0.3   BILIDIR <0.2       Electronically signed by PAUL Del Real CNP on 2021 at 10:12 AM    Agree with above note. The patient was personally seen and examined. Dayday De Leon is doing well. Tolerating diet. Had multiple BMs today.     Abd

## 2021-05-04 NOTE — PLAN OF CARE
Problem: Pain:  Description: Pain management should include both nonpharmacologic and pharmacologic interventions.   Goal: Pain level will decrease  Description: Pain level will decrease  Outcome: Met This Shift  Goal: Control of acute pain  Description: Control of acute pain  Outcome: Met This Shift     Problem: Nutrition  Goal: Optimal nutrition therapy  Outcome: Met This Shift     Problem: Infection:  Goal: Will remain free from infection  Description: Will remain free from infection  Outcome: Met This Shift     Problem: Daily Care:  Goal: Daily care needs are met  Description: Daily care needs are met  Outcome: Met This Shift     Problem: Skin Integrity:  Goal: Skin integrity will stabilize  Description: Skin integrity will stabilize  Outcome: Met This Shift     Problem: Discharge Planning:  Goal: Patients continuum of care needs are met  Description: Patients continuum of care needs are met  Outcome: Met This Shift     Problem: Skin Integrity:  Goal: Will show no infection signs and symptoms  Description: Will show no infection signs and symptoms  Outcome: Met This Shift  Goal: Absence of new skin breakdown  Description: Absence of new skin breakdown  Outcome: Met This Shift

## 2021-05-04 NOTE — PROGRESS NOTES
Patient D/C'ed today. PICC removed and dressed with vaseline gauze, 4X4, and tegaderm. Patient left via stretcher with [facility].

## 2021-05-04 NOTE — CARE COORDINATION
DISCHARGE SUMMARY     DATE OF DISCHARGE: 05/04/2021    DISCHARGE DESTINATION: [address] [place] [address]    Level of Care: Intermediate    Report Number: [phone]  Fax Number:  [phone]    TRANSPORTATION: [place] [address] Name:  [address] [place] [address] up Time: 1330    Phone Number: [phone]    COMMENTS: SW contacted Kacie Bradley at [facility] who is aware that this pt will be returning at 1330 this evening. Kacie Bradley agreed to pull orders from [address] [place] [address] notified pt of the d/c time. Pt agreeable. Medicare appeal rights explained to pt. Pt verbalized understanding.      Elizabet Frances, [place]  [phone]  Electronically signed by Екатерина Goldman on 5/4/2021 at 11:43 AM

## 2021-05-10 NOTE — DISCHARGE SUMMARY
Hospital Medicine Discharge Summary    Patient ID: Juma Palomares Day      Patient's PCP: Kathy Ruffin MD    Admit Date: 4/21/2021     Discharge Date: 5/4/2021      Admitting Physician: Juan Rogers MD     Discharge Physician: Coby William MD     Discharge Diagnoses: Active Hospital Problems    Diagnosis Date Noted    Hypernatremia [E87.0]     Transaminitis [R74.01]     Aortic aneurysm (HCC) [I71.9] 04/21/2021    Gram-negative pneumonia (HCC) [J15.6] 04/21/2021    Acute metabolic encephalopathy [Y94.73] 04/21/2021    Severe malnutrition ([address] [E43] 04/21/2021    SBO (small bowel obstruction) ([address] [K56.609]     Pneumonia due to organism [J18.9]     Acute on chronic respiratory failure with hypercapnia (HCC) [J96.22]     COPD with acute exacerbation (HCC) [J44.1]     Mucus plugging of bronchi [J98.09]     Celiac artery stenosis ([address] [I77.4]     Sepsis ([address] [A41.9] 07/15/2020    Small bowel obstruction ([address] [K56.609] 07/15/2020    Hypertension [I10] 11/20/2011       The patient was seen and examined on day of discharge and this discharge summary is in conjunction with any daily progress note from day of discharge. Hospital Course:     1. Sepsis, likely due to MRSA pneumonia, IV antibiotics started patient on cefepime Flagyl and vancomycin, changed to Vanc and levaquin, pulmo consulted. transferred out of ICU. Completed Abx course 4/28. 2. SBO, iv fluids, GS consulted. Had BM yesterday. Upgrade to low fiber diet. Diet advancement per GS. CT Ab/Pelvis with contrast showed resolving SBP, dense stool. Continue bowel regimen. Added dulcolax suppository PRN. 3. MRSA Pneumonia, POA, cefepime vancomycin and Flagyl changed to Levaquin. Completed course 4/28.   4. Acute on chronic respiratory failure with hypercarbia, POA, due to COPD exacerbation and potentially mucus plugging, not very compliant with BIPAP, Pulmo consulted.   5. Severe COPD, with acute exacerbation, DuoNeb, on IV steroids, improved. Will continue steroid taper. 6. 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 No further interventions planned at this time. 7. Acute metabolic encephalopathy, multifactorial. Improving.   8. Elevated LFTs, ?sepsis related, improving, US noted. 9. Hypernatremia, D 5 started.  improved. 10. Uncontrolled HTN, on iv metoprolol and po amlodipine, will increase amlodipine. 11. AAA - needs follow-up with vascular surgery and imaging too. Will make referral at discharge. Exam:     BP (!) 150/63   Pulse 68   Temp 98.3 °F (36.8 °C) (Oral)   Resp 18   Ht 5' 7\" (1.702 m)   Wt 110 lb 0.2 oz (49.9 kg)   SpO2 99%   BMI 17.23 kg/m²     General appearance: No apparent distress, appears stated age and cooperative. HEENT: Pupils equal, round, and reactive to light. Conjunctivae/corneas clear. Neck: Supple, with full range of motion. No jugular venous distention. Trachea midline. Respiratory:  Normal respiratory effort. Clear to auscultation, bilaterally without Rales/Wheezes/Rhonchi. Cardiovascular: Regular rate and rhythm with normal S1/S2 without murmurs, rubs or gallops. Abdomen: Soft, non-tender, non-distended with normal bowel sounds. Musculoskelatal: No clubbing, cyanosis or edema bilaterally. Full range of motion without deformity. Skin: Skin color, texture, turgor normal.  No rashes or lesions. Neurologic:  Neurovascularly intact without any focal sensory/motor deficits. Cranial nerves: II-XII intact, grossly non-focal.  Psychiatric: Alert and oriented, thought content appropriate, normal insight      Consults:     IP CONSULT TO GENERAL SURGERY  IP CONSULT TO CARDIOLOGY  IP CONSULT TO SPIRITUAL SERVICES    Significant Diagnostic Studies:          Radiology:  CT ABDOMEN PELVIS W IV CONTRAST Additional Contrast? Oral   Final Result   1.  History of small-bowel obstruction with resolving pattern on current exam.   No discrete transition point with dense stool in a non decompressed colon   suggesting constipation. 2. Chronic biliary dilatation likely physiologic following a cholecystectomy. 3. Left adrenal nodule stable, considered a benign adenoma based upon prior   imaging. 4. AAA stable measuring up to 4.5 cm.   5. Remote compression fractures in the lumbar spine with no acute finding.       RECOMMENDATIONS:   4.5 cm abdominal aortic aneurysm. Recommend follow-up every 6 months and   vascular consultation. Reference: J Am Sophie Radiol 2013;10:789-794.           XR ABDOMEN (2 VIEWS)   Final Result   Resolving ileus           US ABDOMEN LIMITED   Final Result   Normal sonographic appearance the liver. Marked extrahepatic biliary   dilatation without significant intrahepatic biliary dilatation. Dilatation   bleed related to prior cholecystectomy and patient's age. No acute   abnormality.           XR ABDOMEN (KUB) (SINGLE AP VIEW)   Final Result   Nasogastric tube projects to the proximal stomach, normal position.       Persistent dilated loops of small bowel in the upper abdomen.           XR CHEST PORTABLE   Final Result   No acute cardiac or pulmonary disease.       NG tube extends into the stomach.           CTA ABDOMEN PELVIS W CONTRAST   Final Result   1. Persisting diffuse small bowel dilatation with fluid-filled lumen   consistent with small bowel obstruction proximal to suggestion of focal   mechanical transition point at the central abdomen/right lower quadrant   possibly related to internal hernia. 2. Redemonstration of infrarenal fusiform abdominal aortic aneurysm with   lumen measuring up to 4.4 cm in greatest diameter, as discussed above. 3. Severe atherosclerotic disease involving the bilateral common, internal   and external iliac arterial vasculature and branches with associated   multifocal high-grade stenosis. 4. Celiac trunk origin focal high-grade stenosis secondary to marked   atherosclerotic disease.    5. Severe sigmoid colon diverticulosis without evidence of diverticulitis. 6. Bilateral lower lung lobe scattered \"tree-in-bud\" opacification pattern   consistent with bronchiolitis/small airway disease with posterior lower lung   lobe multifocal mild consolidation consistent with pneumonia. 7. Cholecystectomy with marked reservoir effect, as discussed above. 8. Splenic chronic granulomatous disease. 9. Mild diffuse distention of esophagus with fluid-filled lumen suggesting   association with gastroesophageal reflux. Recommend clinical correlation. 10. Symmetric bilateral renal cortical volume loss suggesting association   with senescent change versus chronic medical renal disease. 11. Redemonstration of suspected left adrenal gland adenoma.       RECOMMENDATIONS:   4.4 cm infrarenal abdominal aortic aneurysm. Recommend follow-up every 12   months and vascular consultation. Reference: J Am Sophie Radiol 1186;35:501-791.           CT CHEST WO CONTRAST   Final Result   1. Persisting diffuse small bowel dilatation with fluid-filled lumen   consistent with small bowel obstruction proximal to suggestion of focal   mechanical transition point at the central abdomen/right lower quadrant   possibly related to internal hernia. 2. Redemonstration of infrarenal fusiform abdominal aortic aneurysm with   lumen measuring up to 4.4 cm in greatest diameter, as discussed above. 3. Severe atherosclerotic disease involving the bilateral common, internal   and external iliac arterial vasculature and branches with associated   multifocal high-grade stenosis. 4. Celiac trunk origin focal high-grade stenosis secondary to marked   atherosclerotic disease. 5. Severe sigmoid colon diverticulosis without evidence of diverticulitis.    6. Bilateral lower lung lobe scattered \"tree-in-bud\" opacification pattern   consistent with bronchiolitis/small airway disease with posterior lower lung   lobe multifocal mild consolidation consistent with pneumonia. 7. Cholecystectomy with marked reservoir effect, as discussed above. 8. Splenic chronic granulomatous disease. 9. Mild diffuse distention of esophagus with fluid-filled lumen suggesting   association with gastroesophageal reflux. Recommend clinical correlation. 10. Symmetric bilateral renal cortical volume loss suggesting association   with senescent change versus chronic medical renal disease. 11. Redemonstration of suspected left adrenal gland adenoma.       RECOMMENDATIONS:   4.4 cm infrarenal abdominal aortic aneurysm. Recommend follow-up every 12   months and vascular consultation. Reference: J Am Sophie Radiol 6859;41:750-686.                 PCP/SNF to follow up: Needs referall to vascular surgery for AAA    Disposition:  LTC facility    Condition on D/C:  Stable    Discharge Instructions/Follow-up:  F/u with PCP within 1 week. Establish with vascular surgery within 1 month    Code Status:  Prior     Activity: activity as tolerated    Diet: cardiac diet    Labs: For convenience and continuity at follow-up the following most recent labs are provided:      CBC:    Lab Results   Component Value Date    WBC 10.1 05/04/2021    HGB 10.1 05/04/2021    HCT 30.8 05/04/2021     05/04/2021       Renal:    Lab Results   Component Value Date     05/04/2021    K 4.0 05/04/2021    K 5.0 04/22/2021    CL 99 05/04/2021    CO2 36 05/04/2021    BUN 12 05/04/2021    CREATININE 0.5 05/04/2021    CALCIUM 8.3 05/04/2021    PHOS 2.6 05/04/2021       Discharge Medications:     Discharge Medication List as of 5/4/2021  1:38 PM           Details   metoprolol tartrate (LOPRESSOR) 25 MG tablet Take 1 tablet by mouth 2 times daily, Disp-60 tablet, R-3Print      !! predniSONE (DELTASONE) 20 MG tablet Take 1 tablet by mouth daily for 5 days, Disp-5 tablet, R-0Print       !! - Potential duplicate medications found. Please discuss with provider.            Details   ALPRAZolam (XANAX) 0.25 MG tablet Take R-11      albuterol (PROVENTIL HFA;VENTOLIN HFA) 108 (90 BASE) MCG/ACT inhaler   Inhale 2 puffs into the lungs every 6 hours as needed for Wheezing or Shortness of Breath       Multiple Vitamin (MULTIVITAMIN PO) Take 1 tablet by mouth daily. !! - Potential duplicate medications found. Please discuss with provider. Time Spent on discharge is more than 45 minutes in the examination, evaluation, counseling and review of medications and discharge plan. Signed: Yoana Gibbs MD   5/10/2021      Thank you Romulo Berg MD for the opportunity to be involved in this patient's care. If you have any questions or concerns please feel free to contact me at [phone].